# Patient Record
Sex: FEMALE | Race: WHITE | NOT HISPANIC OR LATINO | Employment: FULL TIME | URBAN - METROPOLITAN AREA
[De-identification: names, ages, dates, MRNs, and addresses within clinical notes are randomized per-mention and may not be internally consistent; named-entity substitution may affect disease eponyms.]

---

## 2017-02-23 ENCOUNTER — OFFICE VISIT (OUTPATIENT)
Dept: OBSTETRICS AND GYNECOLOGY | Facility: CLINIC | Age: 62
End: 2017-02-23

## 2017-02-23 VITALS
DIASTOLIC BLOOD PRESSURE: 74 MMHG | BODY MASS INDEX: 21.99 KG/M2 | WEIGHT: 132 LBS | HEIGHT: 65 IN | RESPIRATION RATE: 14 BRPM | SYSTOLIC BLOOD PRESSURE: 122 MMHG

## 2017-02-23 DIAGNOSIS — N95.0 POSTMENOPAUSAL BLEEDING: Primary | ICD-10-CM

## 2017-02-23 PROCEDURE — 99213 OFFICE O/P EST LOW 20 MIN: CPT | Performed by: OBSTETRICS & GYNECOLOGY

## 2017-02-23 RX ORDER — FLUTICASONE PROPIONATE 50 MCG
2 SPRAY, SUSPENSION (ML) NASAL DAILY
COMMUNITY

## 2017-02-23 RX ORDER — MONTELUKAST SODIUM 10 MG/1
10 TABLET ORAL NIGHTLY
COMMUNITY

## 2017-02-23 RX ORDER — LEVOTHYROXINE SODIUM 0.07 MG/1
75 TABLET ORAL DAILY
COMMUNITY

## 2017-02-23 NOTE — PROGRESS NOTES
"Subjective   Chief Complaint   Patient presents with   • Dysmenorrhea     Arely Mesa is a 61 y.o. year old .  Patient's last menstrual period was 2014 (approximate).  She presents to be seen because of evaluation for cramps.  Approximately 3 weeks ago she had midline suprapubic cramps.  Subsequently there was a very small spot of blood.  Since that time there has been nothing.  She is on Duavee.  Likes this much better than Prempro.  Hot flashes are much better controlled.    OTHER COMPLAINTS:  none    The following portions of the patient's history were reviewed and updated as appropriate:current medications, allergies, past family history, past medical history, past social history and past surgical history    Smoking status: Never Smoker                                                                 Smokeless status: Not on file                       Review of Systems  Consitutional POS: nothing reported    NEG: anorexia or night sweats   Gastointestinal POS: nothing reported    NEG: bloating, change in bowel habits, melena or reflux symptoms   Genitourinary POS: nothing reported    NEG: dysuria or hematuria   Integument POS: nothing reported    NEG: moles that are changing in size, shape, color or rashes   Breast POS: nothing reported    NEG: persistent breast lump, skin dimpling or nipple discharge         Objective   Visit Vitals   • /74   • Resp 14   • Ht 65\" (165.1 cm)   • Wt 132 lb (59.9 kg)   • LMP 2014 (Approximate)   • Breastfeeding No   • BMI 21.97 kg/m2       General:  well developed; well nourished  no acute distress   Pelvis: Clinical staff was present for exam  External genitalia:  normal appearance of the external genitalia including Bartholin's and [place]'s glands.  :  urethral meatus normal; urethral hypermobility is absent.  Vaginal:  normal pink mucosa without prolapse or lesions.  Cervix:  normal appearance. Small (2 mm) polyp at external cervical os     Lab " Review   No data reviewed    Imaging   No data reviewed        Assessment   1. Postmenopausal spotting on hormone replacement therapy  2. Asymptomatic cervical polyp with recent normal Pap smear     Plan   1. The following tests were ordered today: ultrasound any time that is convenient for her.  It was explained to Arely that all lab test should be back within the one week after they are performed. She will be notified about the results, regardless of the findings. If she has not been contacted by the office within 2 weeks after the test has been performed, it is her responsibility to contact us to learn about her results.  2. Follow up for ultrasound    3. The following data needs to be obtained to update her medical records: last ovarian cancer screening report from [facility].       This note was electronically signed.    Linus Baxter M.D.  February 23, 2017

## 2017-02-23 NOTE — PROGRESS NOTES
Transvaginal ultrasound performed today demonstrates a thin uniform endometrium without obvious intracavitary pathology.  There are a couple fibroids present just do not appear to encroach on the endometrial cavity.    Impression:  Dysfunctional uterine bleeding presumed from either polyp or hormone replacement therapy    Recommendation:  No additional treatment is needed unless the bleeding becomes a quality of life issue.      Linus Baxter M.D.  February 23, 2017  10:20 AM

## 2017-05-08 ENCOUNTER — OFFICE VISIT (OUTPATIENT)
Dept: OBSTETRICS AND GYNECOLOGY | Facility: CLINIC | Age: 62
End: 2017-05-08

## 2017-05-08 VITALS
RESPIRATION RATE: 14 BRPM | SYSTOLIC BLOOD PRESSURE: 118 MMHG | DIASTOLIC BLOOD PRESSURE: 74 MMHG | WEIGHT: 133 LBS | HEIGHT: 65 IN | BODY MASS INDEX: 22.16 KG/M2

## 2017-05-08 DIAGNOSIS — M85.862 OSTEOPENIA OF LOWER LEG, BILATERAL: ICD-10-CM

## 2017-05-08 DIAGNOSIS — M85.861 OSTEOPENIA OF LOWER LEG, BILATERAL: ICD-10-CM

## 2017-05-08 DIAGNOSIS — Z01.419 WELL WOMAN EXAM WITH ROUTINE GYNECOLOGICAL EXAM: Primary | Chronic | ICD-10-CM

## 2017-05-08 PROCEDURE — 99396 PREV VISIT EST AGE 40-64: CPT | Performed by: OBSTETRICS & GYNECOLOGY

## 2017-05-08 RX ORDER — CETIRIZINE HYDROCHLORIDE 10 MG/1
10 TABLET ORAL DAILY
COMMUNITY

## 2018-03-05 ENCOUNTER — TELEPHONE (OUTPATIENT)
Dept: OBSTETRICS AND GYNECOLOGY | Facility: CLINIC | Age: 63
End: 2018-03-05

## 2018-03-05 NOTE — TELEPHONE ENCOUNTER
Broke her leg and is now on 325mg asa  twice a day is wanting to know should she still take her HRT, she has herd it could cause blood clots

## 2018-03-05 NOTE — TELEPHONE ENCOUNTER
If she is having a surgical repair orders immobilized for a prolonged period of time she may want to hold the hormones until she is more mobile.  Otherwise she can continue taking them.

## 2018-03-05 NOTE — TELEPHONE ENCOUNTER
Provider Name mac  Reason for Call medication question - recent bone break on aspirin - wants to know if she can continue HRT  [facility]  Call Back Number [phone]

## 2018-04-18 ENCOUNTER — APPOINTMENT (OUTPATIENT)
Dept: BONE DENSITY | Facility: HOSPITAL | Age: 63
End: 2018-04-18
Attending: OBSTETRICS & GYNECOLOGY

## 2018-06-13 ENCOUNTER — HOSPITAL ENCOUNTER (OUTPATIENT)
Dept: BONE DENSITY | Facility: HOSPITAL | Age: 63
Discharge: HOME | End: 2018-06-13
Attending: OBSTETRICS & GYNECOLOGY

## 2018-06-13 ENCOUNTER — HOSPITAL ENCOUNTER (OUTPATIENT)
Dept: BONE DENSITY | Facility: HOSPITAL | Age: 63
Discharge: HOME | End: 2018-06-13
Attending: OBSTETRICS & GYNECOLOGY | Admitting: OBSTETRICS & GYNECOLOGY

## 2018-06-13 DIAGNOSIS — M85.861 OSTEOPENIA OF LOWER LEG, BILATERAL: ICD-10-CM

## 2018-06-13 DIAGNOSIS — M85.862 OSTEOPENIA OF LOWER LEG, BILATERAL: ICD-10-CM

## 2018-06-13 PROCEDURE — 77080 DXA BONE DENSITY AXIAL: CPT

## 2018-06-18 ENCOUNTER — TELEPHONE (OUTPATIENT)
Dept: OBSTETRICS AND GYNECOLOGY | Facility: CLINIC | Age: 63
End: 2018-06-18

## 2018-10-03 ENCOUNTER — OFFICE VISIT (OUTPATIENT)
Dept: OBSTETRICS AND GYNECOLOGY | Facility: CLINIC | Age: 63
End: 2018-10-03

## 2018-10-03 VITALS
SYSTOLIC BLOOD PRESSURE: 120 MMHG | RESPIRATION RATE: 14 BRPM | BODY MASS INDEX: 23.46 KG/M2 | WEIGHT: 141 LBS | DIASTOLIC BLOOD PRESSURE: 72 MMHG

## 2018-10-03 DIAGNOSIS — Z01.419 WELL WOMAN EXAM WITH ROUTINE GYNECOLOGICAL EXAM: Primary | ICD-10-CM

## 2018-10-03 PROBLEM — H81.01 MENIERE DISEASE, RIGHT: Status: ACTIVE | Noted: 2018-01-01

## 2018-10-03 PROBLEM — Z79.890 HORMONE REPLACEMENT THERAPY: Status: ACTIVE | Noted: 2018-10-03

## 2018-10-03 PROCEDURE — 99396 PREV VISIT EST AGE 40-64: CPT | Performed by: OBSTETRICS & GYNECOLOGY

## 2018-10-03 RX ORDER — AMITRIPTYLINE HYDROCHLORIDE 50 MG/1
50 TABLET, FILM COATED ORAL NIGHTLY
COMMUNITY
End: 2023-03-09

## 2018-10-03 NOTE — PROGRESS NOTES
Subjective   Chief Complaint   Patient presents with   • Gynecologic Exam     Arely Mesa is a 63 y.o. year old  menopausal female presenting to be seen for her annual exam.  This past year she has been on hormone replacement therapy.  There has not been vaginal bleeding in the last 12 months.  Menopausal symptoms are not present.    SEXUAL Hx:  She is not currently sexually active.  In the past year there she has not been sexually active.    Condoms are not needed because she is not sexually active.  She would not like to be screened for STD's at today's exam.  [place] is painful: n/a  HEALTH Hx:  She exercises regularly: no (and has no plans to become more active).  She wears her seat belt: yes.  She has concerns about domestic violence: no.  She has noticed changes in height: no.  OTHER THINGS SHE WANTS TO DISCUSS TODAY:  Nothing else    The following portions of the patient's history were reviewed and updated as appropriate:problem list, current medications, allergies, past family history, past medical history, past social history and past surgical history.    Smoking status: Never Smoker                                                                 Smokeless tobacco: Not on file                         Review of Systems  Constitutional POS: nothing reported    NEG: anorexia or night sweats   Genitourinary POS: nothing reported    NEG: dysuria or hematuria      Gastointestinal POS: nothing reported and had colonoscopy in the past 5 year - results are not in record for review.  Was normal and told next scope 5 years    NEG: bloating, change in bowel habits, melena or reflux symptoms   Integument POS: nothing reported    NEG: moles that are changing in size, shape, color or rashes   Breast POS: nothing reported    NEG: persistent breast lump, skin dimpling or nipple discharge        Objective   /72   Resp 14   Wt 64 kg (141 lb)   LMP 2014 (Approximate)   Breastfeeding? No   BMI  23.46 kg/m²     General:  well developed; well nourished  no acute distress   Skin:  No suspicious lesions seen   Thyroid: normal to inspection and palpation   Breasts:  Examined in supine position  Symmetric without masses or skin dimpling  Nipples normal without inversion, lesions or discharge  There are no palpable axillary nodes   Abdomen: soft, non-tender; no masses  no umbilical or inginual hernias are present  no hepato-splenomegaly   Pelvis: Clinical staff was present for exam  External genitalia:  normal appearance of the external genitalia including Bartholin's and [place]'s glands.  :  urethral meatus normal;  Vaginal:  normal pink mucosa without prolapse or lesions.  Cervix:  normal appearance.  Uterus:  normal size, shape and consistency.  Adnexa:  normal bimanual exam of the adnexa.  Rectal:  digital rectal exam not performed; anus visually normal appearing.        Assessment   1. Normal GYN exam in menopause  2. Menopausal female currently on HRT - without significant symptoms affecting activities of daily living  3. Osteopenia with stable T-scores  4. She is up to date on all relevant gynecologic and colorectal screenings     Plan   1. Pap was not done today.  I explained to Arely that the recommendations for Pap smear interval in a low risk patient has lengthened to 3 years time.  I told Arely she still needs to be seen in our office yearly for a full physical including breast and pelvic exam.  2. She was encouraged to get yearly mammograms.  She should report any palpable breast lump(s) or skin changes regardless of mammographic findings.  I explained to Arely that notification regarding her mammogram results will come from the center performing the study.  Our office will not be routinely calling with mammogram results.  It is her responsibility to make sure that the results from the mammogram are communicated to her by the breast center.  If she has any questions about the results, she is welcome  to call our office anytime.  3. The following data needs to be obtained to update her medical records: last colonoscopy.  4. The importance of keeping all planned follow-up and taking all medications as prescribed was emphasized.  5. Follow up for annual exam 1 year    New Medications Ordered This Visit   Medications   • Conj Estrogens-Bazedoxifene (DUAVEE) 0.45-20 MG tablet     Sig: Take 1 tablet by mouth Daily.     Dispense:  30 tablet     Refill:  12          This note was electronically signed.    Linus Baxter M.D.  October 3, 2018    Note: Speech recognition transcription software may have been used to create portions of this document.  An attempt at proofreading has been made but errors in transcription could still be present.

## 2019-12-26 NOTE — PROGRESS NOTES
Subjective   Chief Complaint   Patient presents with   • Gynecologic Exam     Arely Mesa is a 64 y.o. year old  menopausal female presenting to be seen for her annual exam.  This past year she has been on hormone replacement therapy.  She has not had any vaginal bleeding in the last 12 months.  Menopausal symptoms are not present.  When she tried to stop her hormone replacement therapy around the time of her patellar surgery vasomotor symptoms became a problem.  She found when she takes the hormone replacement therapy every other day she is doing much better.    Went to [place] and [place] this year to speak and for pleasure.    SEXUAL Hx:  She is not currently sexually active.  In the past year there she has not been sexually active.    Condoms are not needed because she is not sexually active.  She would not like to be screened for STD's at today's exam.  [place] is painful: n/a  HEALTH Hx:  She exercises regularly: yes.  She wears her seat belt: yes.  She has concerns about domestic violence: no.  She has noticed changes in height: no.  OTHER THINGS SHE WANTS TO DISCUSS TODAY:  Nothing else    The following portions of the patient's history were reviewed and updated as appropriate:problem list, current medications, allergies, past family history, past medical history, past social history and past surgical history.    Social History    Tobacco Use      Smoking status: Never Smoker      Review of Systems  Constitutional POS: nothing reported    NEG: anorexia or night sweats   Genitourinary POS: nothing reported    NEG: dysuria or hematuria      Gastointestinal POS: nothing reported    NEG: bloating, change in bowel habits, melena or reflux symptoms   Integument POS: nothing reported    NEG: moles that are changing in size, shape, color or rashes   Breast POS: nothing reported    NEG: persistent breast lump, skin dimpling or nipple discharge        Objective   /74   Resp 14   Wt 64.4 kg  (142 lb)   LMP 02/23/2014 (Approximate)   Breastfeeding No   BMI 23.63 kg/m²     General:  well developed; well nourished  no acute distress   Skin:  No suspicious lesions seen   Thyroid: normal to inspection and palpation   Breasts:  Examined in supine position  Symmetric without masses or skin dimpling  Nipples normal without inversion, lesions or discharge  There are no palpable axillary nodes   Abdomen: soft, non-tender; no masses  no umbilical or inguinal hernias are present  no hepato-splenomegaly   Pelvis: Clinical staff was present for exam  External genitalia:  normal appearance of the external genitalia including Bartholin's and [place]'s glands.  :  urethral meatus normal;  Vaginal:  atrophic mucosal changes are present;  Cervix:  normal appearance.  Uterus:  normal size, shape and consistency.  Adnexa:  non palpable bilaterally.  Rectal:  digital rectal exam not performed; anus visually normal appearing.        Assessment   1. Normal GYN exam in menopause  2. Menopausal female currently on HRT - without significant symptoms affecting activities of daily living   3. Osteopenia (2014) w/ FRAX n/a due to HRT - DEXA not yet due.  4. Increased risk breast cancer by CASI score - not SERM candidate while on HRT  5. She is up to date on all relevant gynecologic and colorectal screenings     Plan   1. Pap was done today.  If she does not receive the results of the Pap within 2 weeks  time, she was instructed to call to find out the results.  I explained to Arely that the recommendations for Pap smear interval in a low risk patient's has lengthened to 3 years time.  I encouraged her to be seen yearly for a full physical exam including breast and pelvic exam even during the off years when PAP's will not be performed.  2. She was encouraged to get yearly mammograms.  She should report any palpable breast lump(s) or skin changes regardless of mammographic findings.  I explained to Arely that notification regarding  her mammogram results will come from the center performing the study.  Our office will not be routinely calling with mammogram results.  It is her responsibility to make sure that the results from the mammogram are communicated to her by the breast center.  If she has any questions about the results, she is welcome to call our office anytime.  3. Consider discontinuation of hormone replacement therapy at this point now that she is distant from her surgery and otherwise feeling better  4. I reviewed data from the NSABP-2 and STAR studies.  I explained to Arely that for patients with a calculated risk equivalent to a 60 years old woman, these studies documented that 5 years use with either tamoxifen OR raloxifene (SERM's) was associated with a 50% lifetime reduction in the incidence of breast cancer.  With patients taking tamoxifen, there is a slight increase in the risk of uterine cancer.  This risk was not found with raloxifene use.  Both medications are indicated for post-menopausal woman seeking to reduce their risk for breast cancer.  Only tamoxifen is indicated for premenopausal women seeking to reduce their breast cancer risks.  After hearing this information, Arely will see how she does off HRT and if able to stop, will consider chemoprophylaxis.  5. The importance of keeping all planned follow-up and taking all medications as prescribed was emphasized.  6. Follow up for annual exam 1 year    New Medications Ordered This Visit   Medications   • Conj Estrogens-Bazedoxifene (DUAVEE) 0.45-20 MG tablet     Sig: Take 1 tablet by mouth Daily.     Dispense:  30 tablet     Refill:  12          This note was electronically signed.    Linus Baxter M.D.  December 27, 2019    Note: Speech recognition transcription software may have been used to create portions of this document.  An attempt at proofreading has been made but errors in transcription could still be present.

## 2019-12-27 ENCOUNTER — OFFICE VISIT (OUTPATIENT)
Dept: OBSTETRICS AND GYNECOLOGY | Facility: CLINIC | Age: 64
End: 2019-12-27

## 2019-12-27 VITALS
RESPIRATION RATE: 14 BRPM | BODY MASS INDEX: 23.63 KG/M2 | WEIGHT: 142 LBS | DIASTOLIC BLOOD PRESSURE: 74 MMHG | SYSTOLIC BLOOD PRESSURE: 118 MMHG

## 2019-12-27 DIAGNOSIS — Z01.419 WELL WOMAN EXAM WITH ROUTINE GYNECOLOGICAL EXAM: Primary | ICD-10-CM

## 2019-12-27 DIAGNOSIS — Z91.89 INCREASED RISK OF BREAST CANCER: ICD-10-CM

## 2019-12-27 DIAGNOSIS — M85.861 OSTEOPENIA OF LOWER LEG, BILATERAL: ICD-10-CM

## 2019-12-27 DIAGNOSIS — M85.862 OSTEOPENIA OF LOWER LEG, BILATERAL: ICD-10-CM

## 2019-12-27 PROCEDURE — 99396 PREV VISIT EST AGE 40-64: CPT | Performed by: OBSTETRICS & GYNECOLOGY

## 2020-12-29 ENCOUNTER — OFFICE VISIT (OUTPATIENT)
Dept: OBSTETRICS AND GYNECOLOGY | Facility: CLINIC | Age: 65
End: 2020-12-29

## 2020-12-29 VITALS
DIASTOLIC BLOOD PRESSURE: 74 MMHG | SYSTOLIC BLOOD PRESSURE: 116 MMHG | RESPIRATION RATE: 14 BRPM | BODY MASS INDEX: 23.8 KG/M2 | WEIGHT: 143 LBS

## 2020-12-29 DIAGNOSIS — Z79.890 HORMONE REPLACEMENT THERAPY: ICD-10-CM

## 2020-12-29 DIAGNOSIS — M85.861 OSTEOPENIA OF LOWER LEG, BILATERAL: ICD-10-CM

## 2020-12-29 DIAGNOSIS — Z01.419 WELL WOMAN EXAM WITH ROUTINE GYNECOLOGICAL EXAM: Primary | ICD-10-CM

## 2020-12-29 DIAGNOSIS — M85.862 OSTEOPENIA OF LOWER LEG, BILATERAL: ICD-10-CM

## 2020-12-29 DIAGNOSIS — Z91.89 INCREASED RISK OF BREAST CANCER: ICD-10-CM

## 2020-12-29 PROCEDURE — 99397 PER PM REEVAL EST PAT 65+ YR: CPT | Performed by: OBSTETRICS & GYNECOLOGY

## 2020-12-29 RX ORDER — ESTROGEN,CON/M-PROGEST ACET 0.3-1.5MG
1 TABLET ORAL DAILY
Qty: 90 TABLET | Refills: 4 | Status: SHIPPED | OUTPATIENT
Start: 2020-12-29 | End: 2022-03-03

## 2020-12-29 NOTE — PROGRESS NOTES
Subjective   Chief Complaint   Patient presents with   • Gynecologic Exam     Arely Mesa is a 65 y.o. year old  menopausal female presenting to be seen for her annual exam.      This past year she has been on hormone replacement therapy.  Because of the unavailability of Duavee, she has been spacing out her dosing.  She feels not as good taking the Duavee only once or twice a week.  Weight is not changed but the weight is become more central and obesity.  Hot flashes and night sweats a little bit of an issue but not horrible.  She has not had any vaginal bleeding in the last 12 months.    SEXUAL Hx:  She is not currently sexually active.  In the past year there she has not been sexually active.    Condoms are not needed because she is not sexually active.  She would not like to be screened for STD's at today's exam.  [place] is painful: n/a  HEALTH Hx:  She exercises regularly: yes.  She wears her seat belt: yes.  She has concerns about domestic violence: no.  She has noticed changes in height: no.  OTHER THINGS SHE WANTS TO DISCUSS TODAY:  Nothing else    The following portions of the patient's history were reviewed and updated as appropriate:problem list, current medications, allergies, past family history, past medical history, past social history and past surgical history.    Social History    Tobacco Use      Smoking status: Never Smoker      Review of Systems  Constitutional POS: nothing reported    NEG: anorexia or night sweats   Genitourinary POS: nothing reported    NEG: dysuria or hematuria      Gastointestinal POS: nothing reported    NEG: bloating, change in bowel habits, melena or reflux symptoms   Integument POS: nothing reported    NEG: moles that are changing in size, shape, color or rashes   Breast POS: nothing reported    NEG: persistent breast lump, skin dimpling or nipple discharge        Objective   /74   Resp 14   Wt 64.9 kg (143 lb)   LMP 2014 (Approximate)    Breastfeeding No   BMI 23.80 kg/m²     General:  well developed; well nourished  no acute distress   Skin:  No suspicious lesions seen   Thyroid: normal to inspection and palpation   Breasts:  Examined in supine position  Symmetric without masses or skin dimpling  Nipples normal without inversion, lesions or discharge  There are no palpable axillary nodes   Abdomen: soft, non-tender; no masses  no umbilical or inguinal hernias are present  no hepato-splenomegaly   Pelvis: Clinical staff was present for exam  External genitalia:  normal appearance of the external genitalia including Bartholin's and [place]'s glands.  :  urethral meatus normal;  Vaginal:  atrophic mucosal changes are present;  Cervix:  normal appearance.  Uterus:  normal size, shape and consistency.  Adnexa:  non palpable bilaterally.  Rectal:  digital rectal exam not performed; anus visually normal appearing.        Assessment   1. Normal GYN exam in menopause  2. Osteopenia FRAX n/a b/c on HRT - DEXA is not yet due  3. Increased risk breast cancer by CASI score - not SERM candidate while on HRT  4. Menopausal female currently not on HRT - with significant symptoms affecting activities of daily living  5. She is up to date on all relevant gynecologic and colorectal screenings     Plan   1. Pap was not done today.  I explained to Arely that the recommendations for Pap smear interval in a low risk patient has lengthened to 3 years time.  I told Arely she still needs to be seen in our office yearly for a full physical including breast and pelvic exam.`  2. She was encouraged to get yearly mammograms.  She should report any palpable breast lump(s) or skin changes regardless of mammographic findings.  I explained to Arely that notification regarding her mammogram results will come from the center performing the study.  Our office will not be routinely calling with mammogram results.  It is her responsibility to make sure that the results from the  mammogram are communicated to her by the breast center.  If she has any questions about the results, she is welcome to call our office anytime.  3. Resume hormone replacement therapy with Prempro due to the unavailability of Duavee  4. The importance of keeping all planned follow-up and taking all medications as prescribed was emphasized.  5. Follow up for annual exam 1 year    New Medications Ordered This Visit   Medications   • Prempro 0.3-1.5 MG per tablet     Sig: Take 1 tablet by mouth Daily.     Dispense:  90 tablet     Refill:  4          This note was electronically signed.    Linus Baxter M.D.  December 29, 2020    Note: Speech recognition transcription software may have been used to create portions of this document.  An attempt at proofreading has been made but errors in transcription could still be present.

## 2022-03-03 ENCOUNTER — OFFICE VISIT (OUTPATIENT)
Dept: OBSTETRICS AND GYNECOLOGY | Facility: CLINIC | Age: 67
End: 2022-03-03

## 2022-03-03 ENCOUNTER — LAB (OUTPATIENT)
Dept: LAB | Facility: HOSPITAL | Age: 67
End: 2022-03-03

## 2022-03-03 VITALS
BODY MASS INDEX: 23.46 KG/M2 | SYSTOLIC BLOOD PRESSURE: 108 MMHG | WEIGHT: 141 LBS | DIASTOLIC BLOOD PRESSURE: 74 MMHG | RESPIRATION RATE: 14 BRPM

## 2022-03-03 DIAGNOSIS — M85.862 OSTEOPENIA OF LOWER LEG, BILATERAL: ICD-10-CM

## 2022-03-03 DIAGNOSIS — Z91.89 INCREASED RISK OF BREAST CANCER: ICD-10-CM

## 2022-03-03 DIAGNOSIS — Z71.85 VACCINE COUNSELING: ICD-10-CM

## 2022-03-03 DIAGNOSIS — M85.861 OSTEOPENIA OF LOWER LEG, BILATERAL: ICD-10-CM

## 2022-03-03 DIAGNOSIS — R35.0 URINARY FREQUENCY: ICD-10-CM

## 2022-03-03 DIAGNOSIS — Z01.419 WELL WOMAN EXAM WITH ROUTINE GYNECOLOGICAL EXAM: Primary | ICD-10-CM

## 2022-03-03 PROBLEM — H81.01 MENIERE DISEASE, RIGHT: Status: RESOLVED | Noted: 2018-01-01 | Resolved: 2022-03-03

## 2022-03-03 LAB
BACTERIA UR QL AUTO: NORMAL /HPF
BILIRUB UR QL STRIP: NEGATIVE
CLARITY UR: CLEAR
COLOR UR: YELLOW
GLUCOSE UR STRIP-MCNC: NEGATIVE MG/DL
HGB UR QL STRIP.AUTO: NEGATIVE
HYALINE CASTS UR QL AUTO: NORMAL /LPF
KETONES UR QL STRIP: NEGATIVE
LEUKOCYTE ESTERASE UR QL STRIP.AUTO: ABNORMAL
NITRITE UR QL STRIP: NEGATIVE
PH UR STRIP.AUTO: 6.5 [PH] (ref 5–8)
PROT UR QL STRIP: NEGATIVE
RBC # UR STRIP: NORMAL /HPF
REF LAB TEST METHOD: NORMAL
SP GR UR STRIP: 1.01 (ref 1–1.03)
SQUAMOUS #/AREA URNS HPF: NORMAL /HPF
UROBILINOGEN UR QL STRIP: ABNORMAL
WBC # UR STRIP: NORMAL /HPF

## 2022-03-03 PROCEDURE — 81001 URINALYSIS AUTO W/SCOPE: CPT

## 2022-03-03 PROCEDURE — 99397 PER PM REEVAL EST PAT 65+ YR: CPT | Performed by: OBSTETRICS & GYNECOLOGY

## 2022-03-03 RX ORDER — ESTRADIOL 0.5 MG/1
0.5 TABLET ORAL DAILY
Qty: 90 TABLET | Refills: 4 | Status: SHIPPED | OUTPATIENT
Start: 2022-03-03 | End: 2023-03-09 | Stop reason: SDUPTHER

## 2022-03-03 RX ORDER — PROGESTERONE 100 MG/1
100 CAPSULE ORAL DAILY
Qty: 90 CAPSULE | Refills: 4 | Status: SHIPPED | OUTPATIENT
Start: 2022-03-03 | End: 2023-03-09 | Stop reason: SDUPTHER

## 2022-03-03 NOTE — PROGRESS NOTES
Subjective   Chief Complaint   Patient presents with   • Gynecologic Exam     Arely Mesa is a 66 y.o. year old  menopausal female presenting to be seen for her annual exam.  She had been on Duavee but because availability she was changed to Prempro.  Cost was prohibitive and so now she is on Estrace and progesterone.    This past year she has not been on hormone replacement therapy.  She has not had any vaginal bleeding in the last 12 months.  Menopausal symptoms are not present.    SEXUAL Hx:  She is not currently sexually active.  In the past year there she has not been sexually active.    Condoms are not needed because she is not sexually active.  She would not like to be screened for STD's at today's exam.  [place] is painful: n/a  HEALTH Hx:  She exercises regularly: yes.  She wears her seat belt: yes.  She has concerns about domestic violence: no.  She has noticed changes in height: no.  OTHER THINGS SHE WANTS TO DISCUSS TODAY:  Nothing else    The following portions of the patient's history were reviewed and updated as appropriate:problem list, current medications, allergies, past family history, past medical history, past social history and past surgical history.    Social History    Tobacco Use      Smoking status: Never Smoker      Smokeless tobacco: Not on file      Review of Systems  Constitutional POS: nothing reported    NEG: anorexia or night sweats   Genitourinary POS: frequency, urgency and it IS effecting her daily living    NEG: dysuria or hematuria      Gastointestinal POS: nothing reported    NEG: bloating, change in bowel habits, melena or reflux symptoms   Integument POS: nothing reported    NEG: moles that are changing in size, shape, color or rashes   Breast POS: nothing reported    NEG: persistent breast lump, skin dimpling or nipple discharge        Objective   /74   Resp 14   Wt 64 kg (141 lb)   LMP 2014 (Approximate)   Breastfeeding No   BMI 23.46 kg/m²      General:  well developed; well nourished  no acute distress   Skin:  No suspicious lesions seen   Thyroid: normal to inspection and palpation   Breasts:  Examined in supine position  Symmetric without masses or skin dimpling  Nipples normal without inversion, lesions or discharge  There are no palpable axillary nodes   Abdomen: soft, non-tender; no masses  no umbilical or inguinal hernias are present  no hepato-splenomegaly   Pelvis: Clinical staff was present for exam  External genitalia:  normal appearance of the external genitalia including Bartholin's and [place]'s glands.  :  urethral meatus normal;  Vaginal:  atrophic mucosal changes are present;  Cervix:  normal appearance.  Uterus:  normal size, shape and consistency.  Adnexa:  normal bimanual exam of the adnexa.  Rectal:  digital rectal exam not performed; anus visually normal appearing.        Assessment   1. Normal GYN exam in menopause  2. Osteopenia with FRAX n/a - DEXA is due  Increased risk of breast cancer - Lifetime risk < 20 % & CASI score elevated (5 year risk > 1.7 %)  Personal history of colon polyps - patient is up to date on screening colonoscopy  Family history of Alzheimer's.  Patient currently on hormone replacement therapy  Urinary frequency and urgency.  Recent ultrasound at [facility] ovarian cancer screening unremarkable. This is a new finding that does need to be worked up further  3. Menopausal female currently on HRT - without significant symptoms affecting activities of daily living  4. She is up to date on all relevant gynecologic and colorectal screenings     Plan   1. Pap was not done today.  I explained to Arely that the Pap smears are no longer recommended in patient's after 65 years of age.   I stressed to Arely that she still should be seen to be seen yearly for a full physical including breast and pelvic exam.  2. The following tests were ordered today: UA with culture if indicated.  It was explained to Arely that all lab test  should be back within the one week after they are performed. She will be notified about the results, regardless of the findings. If she has not been contacted by the office within 2 weeks after the test has been performed, it is her responsibility to contact us to learn about her results.  3. If there is no evidence of hematuria or UTI, treatment options to include prescription therapy such as anticholinergic or pelvic floor physical therapy  4. She was encouraged to get yearly mammograms.  She should report any palpable breast lump(s) or skin changes regardless of mammographic findings.  I explained to Arely that notification regarding her mammogram results will come from the center performing the study.  Our office will not be routinely calling with mammogram results.  It is her responsibility to make sure that the results from the mammogram are communicated to her by the breast center.  If she has any questions about the results, she is welcome to call our office anytime.  5. Bone density testing was recommended.  I reviewed with Arely that it was always most advisable for all bone density tests for each patient to be done on the same machine over time.  The purpose of this is to improve the accuracy of the interpretation of serial studies.  6. Her vaccine record was reviewed and updated.  7. The importance of keeping all planned follow-up and taking all medications as prescribed was emphasized.  8. Follow up for annual exam 1 year    New Medications Ordered This Visit   Medications   • estradiol (Estrace) 0.5 MG tablet     Sig: Take 1 tablet by mouth Daily.     Dispense:  90 tablet     Refill:  4   • Progesterone (Prometrium) 100 MG capsule     Sig: Take 1 capsule by mouth Daily.     Dispense:  90 capsule     Refill:  4          This note was electronically signed.    Linus Baxter M.D.  March 3, 2022    Part of this note may be an electronic transcription/translation of spoken language to printed text  using the Dragon Dictation System.

## 2022-05-12 ENCOUNTER — TELEPHONE (OUTPATIENT)
Dept: OBSTETRICS AND GYNECOLOGY | Facility: CLINIC | Age: 67
End: 2022-05-12

## 2022-05-12 DIAGNOSIS — R35.0 URINARY FREQUENCY: Primary | ICD-10-CM

## 2022-05-26 ENCOUNTER — HOSPITAL ENCOUNTER (OUTPATIENT)
Dept: BONE DENSITY | Facility: HOSPITAL | Age: 67
Discharge: HOME | End: 2022-05-26
Admitting: OBSTETRICS & GYNECOLOGY

## 2022-05-26 DIAGNOSIS — M85.861 OSTEOPENIA OF LOWER LEG, BILATERAL: ICD-10-CM

## 2022-05-26 DIAGNOSIS — M85.862 OSTEOPENIA OF LOWER LEG, BILATERAL: ICD-10-CM

## 2022-05-26 PROCEDURE — 77080 DXA BONE DENSITY AXIAL: CPT

## 2022-05-27 ENCOUNTER — TELEPHONE (OUTPATIENT)
Dept: OBSTETRICS AND GYNECOLOGY | Facility: CLINIC | Age: 67
End: 2022-05-27

## 2022-05-27 DIAGNOSIS — M85.862 OSTEOPENIA OF LOWER LEG, BILATERAL: Primary | ICD-10-CM

## 2022-05-27 DIAGNOSIS — M85.861 OSTEOPENIA OF LOWER LEG, BILATERAL: Primary | ICD-10-CM

## 2022-05-28 NOTE — TELEPHONE ENCOUNTER
----- Message from Maria Luisa Sandoval RN sent at 5/27/2022 11:23 AM EDT -----  Advised of results and message from Dr baxter, voiced understanding. Desires to have labs ordered by Dr Baxter.

## 2022-05-31 NOTE — TELEPHONE ENCOUNTER
Dr. Baxter' patient   [phone] called patient and left a detailed message advising patient Dr. Baxter placed an order for her blood work.

## 2023-03-09 ENCOUNTER — OFFICE VISIT (OUTPATIENT)
Dept: OBSTETRICS AND GYNECOLOGY | Facility: CLINIC | Age: 68
End: 2023-03-09
Payer: COMMERCIAL

## 2023-03-09 VITALS
BODY MASS INDEX: 22.3 KG/M2 | DIASTOLIC BLOOD PRESSURE: 72 MMHG | SYSTOLIC BLOOD PRESSURE: 118 MMHG | RESPIRATION RATE: 14 BRPM | WEIGHT: 134 LBS

## 2023-03-09 DIAGNOSIS — Z91.89 INCREASED RISK OF BREAST CANCER: ICD-10-CM

## 2023-03-09 DIAGNOSIS — M85.861 OSTEOPENIA OF LOWER LEG, BILATERAL: ICD-10-CM

## 2023-03-09 DIAGNOSIS — Z01.419 WELL WOMAN EXAM WITH ROUTINE GYNECOLOGICAL EXAM: Primary | ICD-10-CM

## 2023-03-09 DIAGNOSIS — Z71.85 VACCINE COUNSELING: ICD-10-CM

## 2023-03-09 DIAGNOSIS — M85.862 OSTEOPENIA OF LOWER LEG, BILATERAL: ICD-10-CM

## 2023-03-09 DIAGNOSIS — Z79.890 HORMONE REPLACEMENT THERAPY: ICD-10-CM

## 2023-03-09 DIAGNOSIS — Z82.0 FH: ALZHEIMER'S DISEASE: ICD-10-CM

## 2023-03-09 PROCEDURE — 99397 PER PM REEVAL EST PAT 65+ YR: CPT | Performed by: OBSTETRICS & GYNECOLOGY

## 2023-03-09 RX ORDER — CHOLECALCIFEROL (VITAMIN D3) 125 MCG
10 CAPSULE ORAL
COMMUNITY

## 2023-03-09 RX ORDER — PROGESTERONE 100 MG/1
100 CAPSULE ORAL DAILY
Qty: 90 CAPSULE | Refills: 4 | Status: SHIPPED | OUTPATIENT
Start: 2023-03-09

## 2023-03-09 RX ORDER — ESTRADIOL 0.5 MG/1
0.5 TABLET ORAL DAILY
Qty: 90 TABLET | Refills: 4 | Status: SHIPPED | OUTPATIENT
Start: 2023-03-09

## 2023-03-09 NOTE — PROGRESS NOTES
Subjective   Chief Complaint   Patient presents with   • Gynecologic Exam     Arely Mesa is a 67 y.o. year old  menopausal female presenting to be seen for her annual exam.  This past year she has been doing pelvic floor physical therapy in an effort to try to help her nighttime urination.  It did help.  She has had 1 episode of hematuria with UTI during the year which did resolve with antibiotics.    Doing a trip to the [place] this year and will be doing private tour in [place] (she is a big [employer] fan).    This past year she has not been on hormone replacement therapy.  She has not had any vaginal bleeding in the last 12 months.  Menopausal symptoms are not present.    SEXUAL Hx:  She is not currently sexually active.  In the past year there she has not been sexually active.    Condoms are not needed because she is not sexually active.  She would not like to be screened for STD's at today's exam.  [place] is painful: n/a  HEALTH Hx:  She exercises regularly: yes.  She wears her seat belt: yes.  She has concerns about domestic violence: no.  She has noticed changes in height: no.  OTHER THINGS SHE WANTS TO DISCUSS TODAY:  Nothing else    The following portions of the patient's history were reviewed and updated as appropriate:problem list, current medications, allergies, past family history, past medical history, past social history and past surgical history.    Social History    Tobacco Use      Smoking status: Never      Smokeless tobacco: Not on file      Review of Systems  Constitutional POS: nothing reported    NEG: anorexia or night sweats   Genitourinary POS: see HPI    NEG: dysuria or hematuria      Gastointestinal POS: nothing reported    NEG: bloating, change in bowel habits, melena or reflux symptoms   Integument POS: nothing reported and she sees her dermatologist for routine skins exams    NEG: moles that are changing in size, shape, color or rashes   Breast POS: nothing  reported    NEG: persistent breast lump, skin dimpling or nipple discharge        Objective   /72   Resp 14   Wt 60.8 kg (134 lb)   LMP 02/23/2014 (Approximate)   BMI 22.30 kg/m²     General:  well developed; well nourished  no acute distress   Skin:  No suspicious lesions seen   Thyroid: normal to inspection and palpation   Breasts:  Examined in supine position  Symmetric without masses or skin dimpling  Nipples normal without inversion, lesions or discharge  There are no palpable axillary nodes   Abdomen: soft, non-tender; no masses  no umbilical or inguinal hernias are present  no hepato-splenomegaly   Pelvis: Clinical staff was present for exam  External genitalia:  normal appearance of the external genitalia including Bartholin's and [place]'s glands.  :  urethral meatus normal;  Vaginal:  normal pink mucosa without prolapse or lesions.  Cervix:  normal appearance.  Uterus:  normal size, shape and consistency.  Adnexa:  normal bimanual exam of the adnexa.  Rectal:  digital rectal exam not performed; anus visually normal appearing.        Assessment   1. Normal GYN exam in menopause  2. Osteopenia with normal FRAX - DEXA is not yet due  3. Increased risk of breast cancer - Lifetime risk < 20 % & CASI score elevated (5 year risk > 1.7 %)  4. Personal history of colon polyps - patient is due for screening colonoscopy  5. Family history of Alzheimer's.  Patient currently on hormone replacement therapy  6. Menopausal female currently on HRT - without significant symptoms affecting activities of daily living  7. She is up to date on all relevant gynecologic and colorectal screenings     Plan   1. Pap was not done today.  I explained to Arely that the Pap smears are no longer recommended in patient's after 65 years of age.   I stressed to Arely that she still should be seen to be seen yearly for a full physical including breast and pelvic exam.  2. She was encouraged to get yearly mammograms.  She should  report any palpable breast lump(s) or skin changes regardless of mammographic findings.  I explained to Arely that notification regarding her mammogram results will come from the center performing the study.  Our office will not be routinely calling with mammogram results.  It is her responsibility to make sure that the results from the mammogram are communicated to her by the breast center.  If she has any questions about the results, she is welcome to call our office anytime.  3. Colonoscopy was recommended for screening for colon cancer.  The procedure was briefly discussed and its benefits for early detection of colon cancer were emphasized.  I explained to Arely that we could help her to schedule it if she wishes.  Additionally, she could also contact her primary care physician to help make this arrangement.  Because she is not at average risk for colon cancer, Cologuard screening would not be an option I would recommend.  After considering these options she wants help setting up her colonoscopy.  Referral will be made to Dr. Patiño for outpatient colonoscopy.  4. Her vaccine record was reviewed and updated.  5. The importance of keeping all planned follow-up and taking all medications as prescribed was emphasized.  6. Follow up for annual exam 1 year    New Medications Ordered This Visit   Medications   • estradiol (Estrace) 0.5 MG tablet     Sig: Take 1 tablet by mouth Daily.     Dispense:  90 tablet     Refill:  4   • Progesterone (Prometrium) 100 MG capsule     Sig: Take 1 capsule by mouth Daily.     Dispense:  90 capsule     Refill:  4          This note was electronically signed.    Linus Baxter M.D.  March 9, 2023    Part of this note may be an electronic transcription/translation of spoken language to printed text using the Dragon Dictation System.

## 2024-03-18 NOTE — PROGRESS NOTES
Subjective   Chief Complaint   Patient presents with    Gynecologic Exam     Arely Mesa is a 68 y.o. year old  menopausal female presenting to be seen for her annual exam.  Nocturia is getting worse.  She has tried evening fluid modification and pelvic floor physical therapy none of which have helped.  She is also been checked for UTIs and did not have it.    This past year she has not been on hormone replacement therapy.  She has not had any vaginal bleeding in the last 12 months.  Menopausal symptoms are not present.    SEXUAL Hx:  She is not currently sexually active.  In the past year there she has not been sexually active.    Condoms are not needed because she is not sexually active.  She would not like to be screened for STD's at today's exam.  [place] is painful: n/a  HEALTH Hx:  She exercises regularly: yes.  She wears her seat belt: yes.  She has concerns about domestic violence: no.  She has noticed changes in height: no.    The following portions of the patient's history were reviewed and updated as appropriate:problem list, current medications, allergies, past family history, past medical history, past social history, and past surgical history.    Social History    Tobacco Use      Smoking status: Never      Smokeless tobacco: Not on file      Review of Systems  Constitutional POS: nothing reported    NEG: anorexia or night sweats   Genitourinary POS: nocturia and it IS effecting her daily living    NEG: dysuria or hematuria      Gastointestinal POS: nothing reported    NEG: bloating, change in bowel habits, melena, or reflux symptoms   Integument POS: nothing reported    NEG: moles that are changing in size, shape, color or rashes   Breast POS: nothing reported    NEG: persistent breast lump, skin dimpling, or nipple discharge        Objective   /70   Resp 14   Wt 62.1 kg (137 lb)   LMP 2014 (Approximate)   BMI 22.80 kg/m²     General:  well developed; well nourished  no  acute distress   Skin:  No suspicious lesions seen   Thyroid: normal to inspection and palpation   Breasts:  Examined in supine position  Symmetric without masses or skin dimpling  Nipples normal without inversion, lesions or discharge  There are no palpable axillary nodes   Abdomen: soft, non-tender; no masses  no umbilical or inguinal hernias are present  no hepato-splenomegaly   Pelvis: Clinical staff was present for exam  External genitalia:  normal appearance of the external genitalia including Bartholin's and [place]'s glands.  :  urethral meatus normal;  Vaginal:  normal pink mucosa without prolapse or lesions.  Cervix:  normal appearance.  Uterus:  normal size, shape and consistency.  Adnexa:  normal bimanual exam of the adnexa.  Rectal:  digital rectal exam not performed; anus visually normal appearing.        Assessment   Normal GYN exam in menopause  Osteopenia with normal FRAX - DEXA is not yet due  Increased risk of breast cancer - Lifetime risk < 20 % & CASI score elevated (5 year risk > 1.7 %)  OAB - affecting her activities of daily living.  No contraindications to prescribing  Family history of colon cancer/polyps - patient is up to date on screening colonoscopy  Family history of Alzheimer's.  Patient currently on hormone replacement therapy  Menopausal female currently on HRT - without significant symptoms affecting activities of daily living  She is up to date on all relevant gynecologic and colorectal screenings       Plan   Pap was not done today.  I explained to Aerly that the Pap smears are no longer recommended in patient's after 65 years of age.   I stressed to Arely that she still should be seen to be seen yearly for a full physical including breast and pelvic exam.  She was encouraged to get yearly mammograms.  She should report any palpable breast lump(s) or skin changes regardless of mammographic findings.  I explained to Arely that notification regarding her mammogram results will come  from the center performing the study.  Our office will not be routinely calling with mammogram results.  It is her responsibility to make sure that the results from the mammogram are communicated to her by the breast center.  If she has any questions about the results, she is welcome to call our office anytime.  The following data needs to be obtained to update her medical records: last colonoscopy and pathology reports.  I have counseled the patient on the importance of staying up to date with preventive vaccines as well as the risks and benefits of these vaccines.  Her vaccine record was reviewed and updated.  The importance of keeping all planned follow-up and taking all medications as prescribed was emphasized.  Follow up in 8 weeks time to reassess     New Medications Ordered This Visit   Medications    Progesterone (Prometrium) 100 MG capsule     Sig: Take 1 capsule by mouth Daily.     Dispense:  90 capsule     Refill:  4    estradiol (Estrace) 0.5 MG tablet     Sig: Take 1 tablet by mouth Daily.     Dispense:  90 tablet     Refill:  4    oxybutynin XL (Ditropan XL) 10 MG 24 hr tablet     Sig: Take 1 tablet by mouth Daily.     Dispense:  90 tablet     Refill:  1          This note was electronically signed.    Linus Baxter M.D.  March 21, 2024    Part of this note may be an electronic transcription/translation of spoken language to printed text using the Dragon Dictation System.

## 2024-03-21 ENCOUNTER — OFFICE VISIT (OUTPATIENT)
Dept: OBSTETRICS AND GYNECOLOGY | Facility: CLINIC | Age: 69
End: 2024-03-21
Payer: COMMERCIAL

## 2024-03-21 VITALS
DIASTOLIC BLOOD PRESSURE: 70 MMHG | SYSTOLIC BLOOD PRESSURE: 124 MMHG | BODY MASS INDEX: 22.8 KG/M2 | WEIGHT: 137 LBS | RESPIRATION RATE: 14 BRPM

## 2024-03-21 DIAGNOSIS — M85.862 OSTEOPENIA OF LOWER LEG, BILATERAL: ICD-10-CM

## 2024-03-21 DIAGNOSIS — Z79.890 HORMONE REPLACEMENT THERAPY: ICD-10-CM

## 2024-03-21 DIAGNOSIS — Z91.89 INCREASED RISK OF BREAST CANCER: ICD-10-CM

## 2024-03-21 DIAGNOSIS — M85.861 OSTEOPENIA OF LOWER LEG, BILATERAL: ICD-10-CM

## 2024-03-21 DIAGNOSIS — Z01.419 WELL WOMAN EXAM WITH ROUTINE GYNECOLOGICAL EXAM: Primary | ICD-10-CM

## 2024-03-21 DIAGNOSIS — Z71.85 VACCINE COUNSELING: ICD-10-CM

## 2024-03-21 DIAGNOSIS — R35.1 NOCTURIA: ICD-10-CM

## 2024-03-21 PROCEDURE — 99397 PER PM REEVAL EST PAT 65+ YR: CPT | Performed by: OBSTETRICS & GYNECOLOGY

## 2024-03-21 RX ORDER — TURMERIC 400 MG
CAPSULE ORAL
COMMUNITY

## 2024-03-21 RX ORDER — ESTRADIOL 0.5 MG/1
0.5 TABLET ORAL DAILY
Qty: 90 TABLET | Refills: 4 | Status: SHIPPED | OUTPATIENT
Start: 2024-03-21

## 2024-03-21 RX ORDER — OXYBUTYNIN CHLORIDE 10 MG/1
10 TABLET, EXTENDED RELEASE ORAL DAILY
Qty: 90 TABLET | Refills: 1 | Status: SHIPPED | OUTPATIENT
Start: 2024-03-21

## 2024-03-21 RX ORDER — PROGESTERONE 100 MG/1
100 CAPSULE ORAL DAILY
Qty: 90 CAPSULE | Refills: 4 | Status: SHIPPED | OUTPATIENT
Start: 2024-03-21

## 2024-05-23 ENCOUNTER — OFFICE VISIT (OUTPATIENT)
Dept: OBSTETRICS AND GYNECOLOGY | Facility: CLINIC | Age: 69
End: 2024-05-23
Payer: COMMERCIAL

## 2024-05-23 VITALS
DIASTOLIC BLOOD PRESSURE: 80 MMHG | SYSTOLIC BLOOD PRESSURE: 130 MMHG | BODY MASS INDEX: 22.82 KG/M2 | WEIGHT: 137 LBS | HEIGHT: 65 IN

## 2024-05-23 DIAGNOSIS — Z79.890 HORMONE REPLACEMENT THERAPY: Primary | ICD-10-CM

## 2024-05-23 DIAGNOSIS — R35.1 NOCTURIA: ICD-10-CM

## 2024-05-23 RX ORDER — CONJUGATED ESTROGENS/BAZEDOXIFENE .45; 2 MG/1; MG/1
1 TABLET, FILM COATED ORAL DAILY
Qty: 90 TABLET | Refills: 2 | Status: SHIPPED | OUTPATIENT
Start: 2024-05-23

## 2024-05-23 NOTE — PROGRESS NOTES
"Subjective   Chief Complaint   Patient presents with    Follow-up       Arely Mesa is a 68 y.o. year old .  Patient's last menstrual period was 2014 (approximate).  She comes in follow-up with the oxybutynin.  She is having very few benefits from this.  Frequency urgency and nocturia have not changed much.  She is having dry mouth.  S is having constipation.  She also wonders if she can go back to using Duavee which was available in the past and she did well with that.    The following portions of the patient's history were reviewed and updated as appropriate:current medications and allergies    Social History    Tobacco Use      Smoking status: Never      Smokeless tobacco: Not on file         Objective   /80 (BP Location: Left arm, Patient Position: Sitting, Cuff Size: Adult)   Ht 165.1 cm (65\")   Wt 62.1 kg (137 lb)   LMP 2014 (Approximate)   BMI 22.80 kg/m²     Lab Review   No data reviewed    Imaging   No data reviewed        Assessment   Nocturia with overactive bladder suboptimally controlled with oxybutynin  Menopausal female currently on HRT - without significant symptoms affecting activities of daily living     Plan   I would like to start her back on Duavee to hopefully simplify her hormone replacement regimen  Lets stop the oxybutynin and not start anything else and she will let me know within the next month or 2 if things are improved or if we need to try a different medicine to treat presumed overactive bladder  The importance of keeping all planned follow-up and taking all medications as prescribed was emphasized.      New Medications Ordered This Visit   Medications    Duavee 0.45-20 MG tablet     Sig: Take 1 each by mouth Daily.     Dispense:  90 tablet     Refill:  2          This note was electronically signed.    Linus Baxter M.D.  May 23, 2024      Part of this note may be an electronic transcription/translation of spoken language to printed text using " the Dragon Dictation System.

## 2024-08-06 RX ORDER — MIRABEGRON 50 MG/1
50 TABLET, EXTENDED RELEASE ORAL DAILY
Qty: 30 TABLET | Refills: 4 | Status: SHIPPED | OUTPATIENT
Start: 2024-08-06

## 2024-08-06 RX ORDER — MIRABEGRON 50 MG/1
50 TABLET, EXTENDED RELEASE ORAL DAILY
Qty: 30 TABLET | Refills: 4 | Status: SHIPPED | OUTPATIENT
Start: 2024-08-06 | End: 2024-08-06 | Stop reason: SDUPTHER

## 2025-02-06 ENCOUNTER — APPOINTMENT (OUTPATIENT)
Dept: CT IMAGING | Facility: HOSPITAL | Age: 70
DRG: 436 | End: 2025-02-06
Payer: COMMERCIAL

## 2025-02-06 ENCOUNTER — HOSPITAL ENCOUNTER (INPATIENT)
Facility: HOSPITAL | Age: 70
LOS: 2 days | Discharge: HOME | DRG: 436 | End: 2025-02-08
Attending: STUDENT IN AN ORGANIZED HEALTH CARE EDUCATION/TRAINING PROGRAM | Admitting: STUDENT IN AN ORGANIZED HEALTH CARE EDUCATION/TRAINING PROGRAM
Payer: COMMERCIAL

## 2025-02-06 DIAGNOSIS — R59.1 LYMPHADENOPATHY: ICD-10-CM

## 2025-02-06 DIAGNOSIS — R10.84 GENERALIZED ABDOMINAL PAIN: ICD-10-CM

## 2025-02-06 DIAGNOSIS — D72.829 LEUKOCYTOSIS, UNSPECIFIED TYPE: ICD-10-CM

## 2025-02-06 DIAGNOSIS — K86.89 PANCREATIC MASS: Primary | ICD-10-CM

## 2025-02-06 PROBLEM — N32.81 OVERACTIVE BLADDER: Status: ACTIVE | Noted: 2025-02-06

## 2025-02-06 LAB
ALBUMIN SERPL-MCNC: 4.3 G/DL (ref 3.5–5.2)
ALBUMIN/GLOB SERPL: 1.8 G/DL
ALP SERPL-CCNC: 99 U/L (ref 39–117)
ALT SERPL W P-5'-P-CCNC: 17 U/L (ref 1–33)
ANION GAP SERPL CALCULATED.3IONS-SCNC: 11 MMOL/L (ref 5–15)
AST SERPL-CCNC: 24 U/L (ref 1–32)
BACTERIA UR QL AUTO: ABNORMAL /HPF
BASOPHILS # BLD AUTO: 0.08 10*3/MM3 (ref 0–0.2)
BASOPHILS NFR BLD AUTO: 0.4 % (ref 0–1.5)
BILIRUB SERPL-MCNC: 0.3 MG/DL (ref 0–1.2)
BILIRUB UR QL STRIP: NEGATIVE
BUN SERPL-MCNC: 12 MG/DL (ref 8–23)
BUN/CREAT SERPL: 15.6 (ref 7–25)
CALCIUM SPEC-SCNC: 9 MG/DL (ref 8.6–10.5)
CANCER AG19-9 SERPL-ACNC: 15.5 U/ML
CHLORIDE SERPL-SCNC: 94 MMOL/L (ref 98–107)
CLARITY UR: CLEAR
CO2 SERPL-SCNC: 27 MMOL/L (ref 22–29)
COLOR UR: YELLOW
CREAT SERPL-MCNC: 0.77 MG/DL (ref 0.57–1)
CRP SERPL-MCNC: <0.3 MG/DL (ref 0–0.5)
D DIMER PPP FEU-MCNC: 0.6 MCGFEU/ML (ref 0–0.69)
D-LACTATE SERPL-SCNC: 1.7 MMOL/L (ref 0.5–2)
DEPRECATED RDW RBC AUTO: 40.7 FL (ref 37–54)
EGFRCR SERPLBLD CKD-EPI 2021: 83.6 ML/MIN/1.73
EOSINOPHIL # BLD AUTO: 0.03 10*3/MM3 (ref 0–0.4)
EOSINOPHIL NFR BLD AUTO: 0.2 % (ref 0.3–6.2)
ERYTHROCYTE [DISTWIDTH] IN BLOOD BY AUTOMATED COUNT: 12.4 % (ref 12.3–15.4)
FLUAV RNA RESP QL NAA+PROBE: NOT DETECTED
FLUBV RNA RESP QL NAA+PROBE: NOT DETECTED
GEN 5 1HR TROPONIN T REFLEX: 32 NG/L
GLOBULIN UR ELPH-MCNC: 2.4 GM/DL
GLUCOSE SERPL-MCNC: 102 MG/DL (ref 65–99)
GLUCOSE UR STRIP-MCNC: NEGATIVE MG/DL
HCT VFR BLD AUTO: 40.9 % (ref 34–46.6)
HGB BLD-MCNC: 13.8 G/DL (ref 12–15.9)
HGB UR QL STRIP.AUTO: NEGATIVE
HYALINE CASTS UR QL AUTO: ABNORMAL /LPF
IMM GRANULOCYTES # BLD AUTO: 0.1 10*3/MM3 (ref 0–0.05)
IMM GRANULOCYTES NFR BLD AUTO: 0.5 % (ref 0–0.5)
INR PPP: 0.95 (ref 0.89–1.12)
KETONES UR QL STRIP: ABNORMAL
LEUKOCYTE ESTERASE UR QL STRIP.AUTO: ABNORMAL
LIPASE SERPL-CCNC: 30 U/L (ref 13–60)
LYMPHOCYTES # BLD AUTO: 1.45 10*3/MM3 (ref 0.7–3.1)
LYMPHOCYTES NFR BLD AUTO: 7.8 % (ref 19.6–45.3)
MAGNESIUM SERPL-MCNC: 1.7 MG/DL (ref 1.6–2.4)
MCH RBC QN AUTO: 29.9 PG (ref 26.6–33)
MCHC RBC AUTO-ENTMCNC: 33.7 G/DL (ref 31.5–35.7)
MCV RBC AUTO: 88.7 FL (ref 79–97)
MONOCYTES # BLD AUTO: 1.22 10*3/MM3 (ref 0.1–0.9)
MONOCYTES NFR BLD AUTO: 6.5 % (ref 5–12)
NEUTROPHILS NFR BLD AUTO: 15.78 10*3/MM3 (ref 1.7–7)
NEUTROPHILS NFR BLD AUTO: 84.6 % (ref 42.7–76)
NITRITE UR QL STRIP: NEGATIVE
NRBC BLD AUTO-RTO: 0 /100 WBC (ref 0–0.2)
PH UR STRIP.AUTO: 7 [PH] (ref 5–8)
PLATELET # BLD AUTO: 304 10*3/MM3 (ref 140–450)
PMV BLD AUTO: 9.5 FL (ref 6–12)
POTASSIUM SERPL-SCNC: 3.7 MMOL/L (ref 3.5–5.2)
PROCALCITONIN SERPL-MCNC: 0.03 NG/ML (ref 0–0.25)
PROT SERPL-MCNC: 6.7 G/DL (ref 6–8.5)
PROT UR QL STRIP: ABNORMAL
PROTHROMBIN TIME: 12.8 SECONDS (ref 12.2–14.5)
RBC # BLD AUTO: 4.61 10*6/MM3 (ref 3.77–5.28)
RBC # UR STRIP: ABNORMAL /HPF
REF LAB TEST METHOD: ABNORMAL
RSV RNA RESP QL NAA+PROBE: NOT DETECTED
SARS-COV-2 RNA RESP QL NAA+PROBE: NOT DETECTED
SODIUM SERPL-SCNC: 132 MMOL/L (ref 136–145)
SP GR UR STRIP: 1.02 (ref 1–1.03)
SQUAMOUS #/AREA URNS HPF: ABNORMAL /HPF
TROPONIN T % DELTA: 60
TROPONIN T NUMERIC DELTA: 12 NG/L
TROPONIN T SERPL HS-MCNC: 20 NG/L
UROBILINOGEN UR QL STRIP: ABNORMAL
WBC # UR STRIP: ABNORMAL /HPF
WBC NRBC COR # BLD AUTO: 18.66 10*3/MM3 (ref 3.4–10.8)

## 2025-02-06 PROCEDURE — 80053 COMPREHEN METABOLIC PANEL: CPT | Performed by: STUDENT IN AN ORGANIZED HEALTH CARE EDUCATION/TRAINING PROGRAM

## 2025-02-06 PROCEDURE — 93005 ELECTROCARDIOGRAM TRACING: CPT | Performed by: STUDENT IN AN ORGANIZED HEALTH CARE EDUCATION/TRAINING PROGRAM

## 2025-02-06 PROCEDURE — 83605 ASSAY OF LACTIC ACID: CPT | Performed by: STUDENT IN AN ORGANIZED HEALTH CARE EDUCATION/TRAINING PROGRAM

## 2025-02-06 PROCEDURE — 25810000003 LACTATED RINGERS SOLUTION: Performed by: STUDENT IN AN ORGANIZED HEALTH CARE EDUCATION/TRAINING PROGRAM

## 2025-02-06 PROCEDURE — 25510000001 IOPAMIDOL PER 1 ML: Performed by: INTERNAL MEDICINE

## 2025-02-06 PROCEDURE — 25010000002 KETOROLAC TROMETHAMINE PER 15 MG: Performed by: STUDENT IN AN ORGANIZED HEALTH CARE EDUCATION/TRAINING PROGRAM

## 2025-02-06 PROCEDURE — 63710000001 ONDANSETRON ODT 4 MG TABLET DISPERSIBLE: Performed by: STUDENT IN AN ORGANIZED HEALTH CARE EDUCATION/TRAINING PROGRAM

## 2025-02-06 PROCEDURE — 85025 COMPLETE CBC W/AUTO DIFF WBC: CPT | Performed by: STUDENT IN AN ORGANIZED HEALTH CARE EDUCATION/TRAINING PROGRAM

## 2025-02-06 PROCEDURE — 71275 CT ANGIOGRAPHY CHEST: CPT

## 2025-02-06 PROCEDURE — 85379 FIBRIN DEGRADATION QUANT: CPT | Performed by: INTERNAL MEDICINE

## 2025-02-06 PROCEDURE — 87637 SARSCOV2&INF A&B&RSV AMP PRB: CPT | Performed by: STUDENT IN AN ORGANIZED HEALTH CARE EDUCATION/TRAINING PROGRAM

## 2025-02-06 PROCEDURE — 83690 ASSAY OF LIPASE: CPT | Performed by: STUDENT IN AN ORGANIZED HEALTH CARE EDUCATION/TRAINING PROGRAM

## 2025-02-06 PROCEDURE — 86140 C-REACTIVE PROTEIN: CPT | Performed by: STUDENT IN AN ORGANIZED HEALTH CARE EDUCATION/TRAINING PROGRAM

## 2025-02-06 PROCEDURE — 84145 PROCALCITONIN (PCT): CPT | Performed by: STUDENT IN AN ORGANIZED HEALTH CARE EDUCATION/TRAINING PROGRAM

## 2025-02-06 PROCEDURE — 99285 EMERGENCY DEPT VISIT HI MDM: CPT

## 2025-02-06 PROCEDURE — 36415 COLL VENOUS BLD VENIPUNCTURE: CPT

## 2025-02-06 PROCEDURE — 25510000001 IOPAMIDOL 61 % SOLUTION: Performed by: STUDENT IN AN ORGANIZED HEALTH CARE EDUCATION/TRAINING PROGRAM

## 2025-02-06 PROCEDURE — 83735 ASSAY OF MAGNESIUM: CPT | Performed by: STUDENT IN AN ORGANIZED HEALTH CARE EDUCATION/TRAINING PROGRAM

## 2025-02-06 PROCEDURE — 86301 IMMUNOASSAY TUMOR CA 19-9: CPT | Performed by: INTERNAL MEDICINE

## 2025-02-06 PROCEDURE — 84484 ASSAY OF TROPONIN QUANT: CPT | Performed by: STUDENT IN AN ORGANIZED HEALTH CARE EDUCATION/TRAINING PROGRAM

## 2025-02-06 PROCEDURE — 81001 URINALYSIS AUTO W/SCOPE: CPT | Performed by: STUDENT IN AN ORGANIZED HEALTH CARE EDUCATION/TRAINING PROGRAM

## 2025-02-06 PROCEDURE — 99222 1ST HOSP IP/OBS MODERATE 55: CPT

## 2025-02-06 PROCEDURE — 85610 PROTHROMBIN TIME: CPT | Performed by: INTERNAL MEDICINE

## 2025-02-06 PROCEDURE — 74177 CT ABD & PELVIS W/CONTRAST: CPT

## 2025-02-06 RX ORDER — ONDANSETRON 2 MG/ML
4 INJECTION INTRAMUSCULAR; INTRAVENOUS EVERY 6 HOURS PRN
Status: DISCONTINUED | OUTPATIENT
Start: 2025-02-06 | End: 2025-02-08 | Stop reason: HOSPADM

## 2025-02-06 RX ORDER — KETOROLAC TROMETHAMINE 15 MG/ML
15 INJECTION, SOLUTION INTRAMUSCULAR; INTRAVENOUS ONCE
Status: COMPLETED | OUTPATIENT
Start: 2025-02-06 | End: 2025-02-06

## 2025-02-06 RX ORDER — ACETAMINOPHEN 325 MG/1
650 TABLET ORAL EVERY 4 HOURS PRN
Status: DISCONTINUED | OUTPATIENT
Start: 2025-02-06 | End: 2025-02-08 | Stop reason: HOSPADM

## 2025-02-06 RX ORDER — ACETAMINOPHEN 650 MG/1
650 SUPPOSITORY RECTAL EVERY 4 HOURS PRN
Status: DISCONTINUED | OUTPATIENT
Start: 2025-02-06 | End: 2025-02-08 | Stop reason: HOSPADM

## 2025-02-06 RX ORDER — SODIUM CHLORIDE 0.9 % (FLUSH) 0.9 %
10 SYRINGE (ML) INJECTION EVERY 12 HOURS SCHEDULED
Status: DISCONTINUED | OUTPATIENT
Start: 2025-02-06 | End: 2025-02-08 | Stop reason: HOSPADM

## 2025-02-06 RX ORDER — BISACODYL 5 MG/1
5 TABLET, DELAYED RELEASE ORAL DAILY PRN
Status: DISCONTINUED | OUTPATIENT
Start: 2025-02-06 | End: 2025-02-08 | Stop reason: HOSPADM

## 2025-02-06 RX ORDER — OXYBUTYNIN CHLORIDE 5 MG/1
5 TABLET, EXTENDED RELEASE ORAL DAILY
Status: DISCONTINUED | OUTPATIENT
Start: 2025-02-07 | End: 2025-02-08 | Stop reason: HOSPADM

## 2025-02-06 RX ORDER — ONDANSETRON 4 MG/1
4 TABLET, ORALLY DISINTEGRATING ORAL ONCE
Status: COMPLETED | OUTPATIENT
Start: 2025-02-06 | End: 2025-02-06

## 2025-02-06 RX ORDER — BISACODYL 10 MG
10 SUPPOSITORY, RECTAL RECTAL DAILY PRN
Status: DISCONTINUED | OUTPATIENT
Start: 2025-02-06 | End: 2025-02-08 | Stop reason: HOSPADM

## 2025-02-06 RX ORDER — RIBOFLAVIN (VITAMIN B2) 100 MG
1 TABLET ORAL DAILY
COMMUNITY

## 2025-02-06 RX ORDER — SODIUM CHLORIDE 0.9 % (FLUSH) 0.9 %
10 SYRINGE (ML) INJECTION AS NEEDED
Status: DISCONTINUED | OUTPATIENT
Start: 2025-02-06 | End: 2025-02-08 | Stop reason: HOSPADM

## 2025-02-06 RX ORDER — IPRATROPIUM BROMIDE AND ALBUTEROL SULFATE 2.5; .5 MG/3ML; MG/3ML
3 SOLUTION RESPIRATORY (INHALATION) EVERY 6 HOURS PRN
Status: DISCONTINUED | OUTPATIENT
Start: 2025-02-06 | End: 2025-02-08 | Stop reason: HOSPADM

## 2025-02-06 RX ORDER — OXYCODONE HYDROCHLORIDE 5 MG/1
5 TABLET ORAL ONCE
Status: COMPLETED | OUTPATIENT
Start: 2025-02-06 | End: 2025-02-06

## 2025-02-06 RX ORDER — ACETAMINOPHEN 160 MG/5ML
650 SOLUTION ORAL EVERY 4 HOURS PRN
Status: DISCONTINUED | OUTPATIENT
Start: 2025-02-06 | End: 2025-02-08 | Stop reason: HOSPADM

## 2025-02-06 RX ORDER — THIAMINE HCL 100 MG
2 TABLET ORAL DAILY
COMMUNITY

## 2025-02-06 RX ORDER — MONTELUKAST SODIUM 10 MG/1
10 TABLET ORAL NIGHTLY
Status: DISCONTINUED | OUTPATIENT
Start: 2025-02-06 | End: 2025-02-08 | Stop reason: HOSPADM

## 2025-02-06 RX ORDER — CYCLOBENZAPRINE HCL 10 MG
5 TABLET ORAL NIGHTLY
Status: DISCONTINUED | OUTPATIENT
Start: 2025-02-06 | End: 2025-02-08 | Stop reason: HOSPADM

## 2025-02-06 RX ORDER — ENOXAPARIN SODIUM 100 MG/ML
40 INJECTION SUBCUTANEOUS DAILY
Status: DISCONTINUED | OUTPATIENT
Start: 2025-02-07 | End: 2025-02-08 | Stop reason: HOSPADM

## 2025-02-06 RX ORDER — IOPAMIDOL 612 MG/ML
85 INJECTION, SOLUTION INTRAVASCULAR
Status: COMPLETED | OUTPATIENT
Start: 2025-02-06 | End: 2025-02-06

## 2025-02-06 RX ORDER — POLYETHYLENE GLYCOL 3350 17 G/17G
17 POWDER, FOR SOLUTION ORAL DAILY PRN
Status: DISCONTINUED | OUTPATIENT
Start: 2025-02-06 | End: 2025-02-08 | Stop reason: HOSPADM

## 2025-02-06 RX ORDER — IOPAMIDOL 755 MG/ML
75 INJECTION, SOLUTION INTRAVASCULAR
Status: COMPLETED | OUTPATIENT
Start: 2025-02-06 | End: 2025-02-06

## 2025-02-06 RX ORDER — HYDROMORPHONE HYDROCHLORIDE 1 MG/ML
0.5 INJECTION, SOLUTION INTRAMUSCULAR; INTRAVENOUS; SUBCUTANEOUS
Status: DISCONTINUED | OUTPATIENT
Start: 2025-02-06 | End: 2025-02-08 | Stop reason: HOSPADM

## 2025-02-06 RX ORDER — LEVOTHYROXINE SODIUM 75 UG/1
75 TABLET ORAL
Status: DISCONTINUED | OUTPATIENT
Start: 2025-02-07 | End: 2025-02-07

## 2025-02-06 RX ORDER — AMOXICILLIN 250 MG
2 CAPSULE ORAL 2 TIMES DAILY PRN
Status: DISCONTINUED | OUTPATIENT
Start: 2025-02-06 | End: 2025-02-08 | Stop reason: HOSPADM

## 2025-02-06 RX ORDER — CYCLOSPORINE 0.5 MG/ML
2 EMULSION OPHTHALMIC EVERY 12 HOURS
COMMUNITY
Start: 2024-10-16

## 2025-02-06 RX ADMIN — SODIUM CHLORIDE, SODIUM LACTATE, POTASSIUM CHLORIDE, CALCIUM CHLORIDE 1000 ML: 20; 30; 600; 310 INJECTION, SOLUTION INTRAVENOUS at 16:28

## 2025-02-06 RX ADMIN — Medication 10 ML: at 21:53

## 2025-02-06 RX ADMIN — IOPAMIDOL 75 ML: 755 INJECTION, SOLUTION INTRAVENOUS at 21:16

## 2025-02-06 RX ADMIN — CYCLOBENZAPRINE HYDROCHLORIDE 5 MG: 10 TABLET, FILM COATED ORAL at 21:53

## 2025-02-06 RX ADMIN — MONTELUKAST 10 MG: 10 TABLET, FILM COATED ORAL at 21:53

## 2025-02-06 RX ADMIN — OXYCODONE 5 MG: 5 TABLET ORAL at 14:43

## 2025-02-06 RX ADMIN — KETOROLAC TROMETHAMINE 15 MG: 15 INJECTION, SOLUTION INTRAMUSCULAR; INTRAVENOUS at 16:28

## 2025-02-06 RX ADMIN — ONDANSETRON 4 MG: 4 TABLET, ORALLY DISINTEGRATING ORAL at 14:43

## 2025-02-06 RX ADMIN — IOPAMIDOL 85 ML: 612 INJECTION, SOLUTION INTRAVENOUS at 15:11

## 2025-02-06 NOTE — ED NOTES
Arely Mesa    Nursing Report ED to Floor:  Mental status: alert and oriented x4  Ambulatory status: up ad amanda  Oxygen Therapy:  room air  Cardiac Rhythm: NSR  Admitted from: ED  Safety Concerns:  n/a  Precautions: n/a  Social Issues: n/a  ED Room #:  2    ED Nurse Phone Extension - [phone] or may call [phone].      HPI:   Chief Complaint   Patient presents with    Abdominal Pain       Past Medical History:  Past Medical History:   Diagnosis Date    Asthma     Fibroids     Genital HSV     Hypercholesteremia     Off meds ~     Hypothyroid 2003    Meniere disease, right along with tinnitus     Tubular adenoma of colon 2015    f/u scope 2018 normal    Tubular adenoma of colon 2023        Past Surgical History:  Past Surgical History:   Procedure Laterality Date     SECTION  1985    ORIF PATELLA FRACTURE Left 2018    RHINOPLASTY  1990    TONSILLECTOMY  1964    TRIGGER FINGER RELEASE Left 2013    index finger        Admitting Doctor:   Elina May MD    Consulting Provider(s):  Consults       No orders found from 2025 to 2025.             Admitting Diagnosis:       Most Recent Vitals:   Vitals:    25 1700 25 1730 25 1800 25 1830   BP: 158/99 149/79 163/91 (!) 169/101   BP Location:       Patient Position:       Pulse: 76 73 108    Resp:       Temp:       TempSrc:       SpO2: 97% 98% 98%    Weight:       Height:           Active LDAs/IV Access:   Lines, Drains & Airways       Active LDAs       Name Placement date Placement time Site Days    Peripheral IV 25 1447 Anterior;Distal;Right Forearm 25  1447  Forearm  less than 1                    Labs (abnormal labs have a star):   Labs Reviewed   COMPREHENSIVE METABOLIC PANEL - Abnormal; Notable for the following components:       Result Value    Glucose 102 (*)     Sodium 132 (*)     Chloride 94 (*)     All other components within normal limits    Narrative:     GFR Categories  in Chronic Kidney Disease (CKD)      GFR Category          GFR (mL/min/1.73)    Interpretation  G1                     90 or greater         Normal or high (1)  G2                      60-89                Mild decrease (1)  G3a                   45-59                Mild to moderate decrease  G3b                   30-44                Moderate to severe decrease  G4                    15-29                Severe decrease  G5                    14 or less           Kidney failure          (1)In the absence of evidence of kidney disease, neither GFR category G1 or G2 fulfill the criteria for CKD.    eGFR calculation 2021 CKD-EPI creatinine equation, which does not include race as a factor   URINALYSIS W/ MICROSCOPIC IF INDICATED (NO CULTURE) - Abnormal; Notable for the following components:    Ketones, UA 15 mg/dL (1+) (*)     Protein, UA 30 mg/dL (1+) (*)     Leuk Esterase, UA Trace (*)     All other components within normal limits   TROPONIN - Abnormal; Notable for the following components:    HS Troponin T 20 (*)     All other components within normal limits    Narrative:     High Sensitive Troponin T Reference Range:  <14.0 ng/L- Negative Female for AMI  <22.0 ng/L- Negative Male for AMI  >=14 - Abnormal Female indicating possible myocardial injury.  >=22 - Abnormal Male indicating possible myocardial injury.   Clinicians would have to utilize clinical acumen, EKG, Troponin, and serial changes to determine if it is an Acute Myocardial Infarction or myocardial injury due to an underlying chronic condition.        CBC WITH AUTO DIFFERENTIAL - Abnormal; Notable for the following components:    WBC 18.66 (*)     Neutrophil % 84.6 (*)     Lymphocyte % 7.8 (*)     Eosinophil % 0.2 (*)     Neutrophils, Absolute 15.78 (*)     Monocytes, Absolute 1.22 (*)     Immature Grans, Absolute 0.10 (*)     All other components within normal limits   URINALYSIS, MICROSCOPIC ONLY - Abnormal; Notable for the following components:     "WBC, UA 3-5 (*)     All other components within normal limits   HIGH SENSITIVITIY TROPONIN T 1HR - Abnormal; Notable for the following components:    HS Troponin T 32 (*)     Troponin T Numeric Delta 12 (*)     Troponin T % Delta 60 (*)     All other components within normal limits    Narrative:     High Sensitive Troponin T Reference Range:  <14.0 ng/L- Negative Female for AMI  <22.0 ng/L- Negative Male for AMI  >=14 - Abnormal Female indicating possible myocardial injury.  >=22 - Abnormal Male indicating possible myocardial injury.   Clinicians would have to utilize clinical acumen, EKG, Troponin, and serial changes to determine if it is an Acute Myocardial Infarction or myocardial injury due to an underlying chronic condition.        COVID-19/FLUA&B/RSV, NP SWAB IN TRANSPORT MEDIA 1 HR TAT - Normal    Narrative:     Fact sheet for providers: https://www.[employer].gov/media/072219/download    Fact sheet for patients: https://www.[employer].gov/media/118272/download    Test performed by PCR.   LIPASE - Normal   LACTIC ACID, PLASMA - Normal   PROCALCITONIN - Normal    Narrative:     As a Marker for Sepsis (Non-Neonates):    1. <0.5 ng/mL represents a low risk of severe sepsis and/or septic shock.  2. >2 ng/mL represents a high risk of severe sepsis and/or septic shock.    As a Marker for Lower Respiratory Tract Infections that require antibiotic therapy:    PCT on Admission    Antibiotic Therapy       6-12 Hrs later    >0.5                Strongly Recommended  >0.25 - <0.5        Recommended   0.1 - 0.25          Discouraged              Remeasure/reassess PCT  <0.1                Strongly Discouraged     Remeasure/reassess PCT    As 28 day mortality risk marker: \"Change in Procalcitonin Result\" (>80% or <=80%) if Day 0 (or Day 1) and Day 4 values are available. Refer to http://www.[employer]-pct-calculator.com    Change in PCT <=80%  A decrease of PCT levels below or equal to 80% defines a positive change in PCT test result " representing a higher risk for 28-day all-cause mortality of patients diagnosed with severe sepsis for septic shock.    Change in PCT >80%  A decrease of PCT levels of more than 80% defines a negative change in PCT result representing a lower risk for 28-day all-cause mortality of patients diagnosed with severe sepsis or septic shock.      C-REACTIVE PROTEIN - Normal   MAGNESIUM - Normal   COVID PRE-OP / PRE-PROCEDURE SCREENING ORDER (NO ISOLATION)    Narrative:     The following orders were created for panel order COVID PRE-OP / PRE-PROCEDURE SCREENING ORDER (NO ISOLATION) - Swab, Nasopharynx.  Procedure                               Abnormality         Status                     ---------                               -----------         ------                     COVID-19, FLU A/B, RSV P...[[id number]]  Normal              Final result                 Please view results for these tests on the individual orders.   D-DIMER, QUANTITATIVE   PROTIME-INR   CANCER ANTIGEN 19-9   CBC AND DIFFERENTIAL    Narrative:     The following orders were created for panel order CBC & Differential.  Procedure                               Abnormality         Status                     ---------                               -----------         ------                     CBC Auto Differential[[id number]]        Abnormal            Final result                 Please view results for these tests on the individual orders.       Meds Given in ED:   Medications   HYDROmorphone (DILAUDID) injection 0.5 mg (has no administration in time range)   ondansetron (ZOFRAN) injection 4 mg (has no administration in time range)   ondansetron ODT (ZOFRAN-ODT) disintegrating tablet 4 mg (4 mg Oral Given 2/6/25 1443)   lactated ringers bolus 1,000 mL (0 mL Intravenous Stopped 2/6/25 1716)   ketorolac (TORADOL) injection 15 mg (15 mg Intravenous Given 2/6/25 1628)   oxyCODONE (ROXICODONE) immediate release tablet 5 mg (5 mg Oral Given 2/6/25 1443)    iopamidol (ISOVUE-300) 61 % injection 85 mL (85 mL Intravenous Given 2/6/25 0088)           Last NIH score:                                                          Dysphagia screening results:        Holli Coma Scale:  No data recorded     CIWA:        Restraint Type:            Isolation Status:  No active isolations

## 2025-02-07 ENCOUNTER — APPOINTMENT (OUTPATIENT)
Dept: ULTRASOUND IMAGING | Facility: HOSPITAL | Age: 70
DRG: 436 | End: 2025-02-07
Payer: COMMERCIAL

## 2025-02-07 ENCOUNTER — APPOINTMENT (OUTPATIENT)
Dept: MRI IMAGING | Facility: HOSPITAL | Age: 70
DRG: 436 | End: 2025-02-07
Payer: COMMERCIAL

## 2025-02-07 LAB
ALBUMIN SERPL-MCNC: 4 G/DL (ref 3.5–5.2)
ALBUMIN/GLOB SERPL: 1.7 G/DL
ALP SERPL-CCNC: 96 U/L (ref 39–117)
ALT SERPL W P-5'-P-CCNC: 15 U/L (ref 1–33)
ANION GAP SERPL CALCULATED.3IONS-SCNC: 8 MMOL/L (ref 5–15)
AST SERPL-CCNC: 25 U/L (ref 1–32)
BASOPHILS # BLD AUTO: 0.05 10*3/MM3 (ref 0–0.2)
BASOPHILS NFR BLD AUTO: 0.4 % (ref 0–1.5)
BILIRUB SERPL-MCNC: 0.4 MG/DL (ref 0–1.2)
BUN SERPL-MCNC: 8 MG/DL (ref 8–23)
BUN/CREAT SERPL: 10.3 (ref 7–25)
CALCIUM SPEC-SCNC: 9 MG/DL (ref 8.6–10.5)
CHLORIDE SERPL-SCNC: 96 MMOL/L (ref 98–107)
CO2 SERPL-SCNC: 30 MMOL/L (ref 22–29)
CREAT SERPL-MCNC: 0.78 MG/DL (ref 0.57–1)
DEPRECATED RDW RBC AUTO: 42.3 FL (ref 37–54)
EGFRCR SERPLBLD CKD-EPI 2021: 82.3 ML/MIN/1.73
EOSINOPHIL # BLD AUTO: 0.1 10*3/MM3 (ref 0–0.4)
EOSINOPHIL NFR BLD AUTO: 0.9 % (ref 0.3–6.2)
ERYTHROCYTE [DISTWIDTH] IN BLOOD BY AUTOMATED COUNT: 12.8 % (ref 12.3–15.4)
GLOBULIN UR ELPH-MCNC: 2.3 GM/DL
GLUCOSE SERPL-MCNC: 91 MG/DL (ref 65–99)
HCT VFR BLD AUTO: 42.6 % (ref 34–46.6)
HGB BLD-MCNC: 14.2 G/DL (ref 12–15.9)
IMM GRANULOCYTES # BLD AUTO: 0.05 10*3/MM3 (ref 0–0.05)
IMM GRANULOCYTES NFR BLD AUTO: 0.4 % (ref 0–0.5)
LYMPHOCYTES # BLD AUTO: 1.47 10*3/MM3 (ref 0.7–3.1)
LYMPHOCYTES NFR BLD AUTO: 13 % (ref 19.6–45.3)
MCH RBC QN AUTO: 29.7 PG (ref 26.6–33)
MCHC RBC AUTO-ENTMCNC: 33.3 G/DL (ref 31.5–35.7)
MCV RBC AUTO: 89.1 FL (ref 79–97)
MONOCYTES # BLD AUTO: 1.06 10*3/MM3 (ref 0.1–0.9)
MONOCYTES NFR BLD AUTO: 9.3 % (ref 5–12)
NEUTROPHILS NFR BLD AUTO: 76 % (ref 42.7–76)
NEUTROPHILS NFR BLD AUTO: 8.62 10*3/MM3 (ref 1.7–7)
NRBC BLD AUTO-RTO: 0 /100 WBC (ref 0–0.2)
PLATELET # BLD AUTO: 278 10*3/MM3 (ref 140–450)
PMV BLD AUTO: 9.4 FL (ref 6–12)
POTASSIUM SERPL-SCNC: 4.6 MMOL/L (ref 3.5–5.2)
PROT SERPL-MCNC: 6.3 G/DL (ref 6–8.5)
RBC # BLD AUTO: 4.78 10*6/MM3 (ref 3.77–5.28)
SODIUM SERPL-SCNC: 134 MMOL/L (ref 136–145)
WBC NRBC COR # BLD AUTO: 11.35 10*3/MM3 (ref 3.4–10.8)

## 2025-02-07 PROCEDURE — C1889 IMPLANT/INSERT DEVICE, NOC: HCPCS

## 2025-02-07 PROCEDURE — 25510000002 GADOBENATE DIMEGLUMINE 529 MG/ML SOLUTION: Performed by: STUDENT IN AN ORGANIZED HEALTH CARE EDUCATION/TRAINING PROGRAM

## 2025-02-07 PROCEDURE — 88307 TISSUE EXAM BY PATHOLOGIST: CPT | Performed by: STUDENT IN AN ORGANIZED HEALTH CARE EDUCATION/TRAINING PROGRAM

## 2025-02-07 PROCEDURE — 0FB13ZX EXCISION OF RIGHT LOBE LIVER, PERCUTANEOUS APPROACH, DIAGNOSTIC: ICD-10-PCS | Performed by: RADIOLOGY

## 2025-02-07 PROCEDURE — A9577 INJ MULTIHANCE: HCPCS | Performed by: STUDENT IN AN ORGANIZED HEALTH CARE EDUCATION/TRAINING PROGRAM

## 2025-02-07 PROCEDURE — 80053 COMPREHEN METABOLIC PANEL: CPT

## 2025-02-07 PROCEDURE — 25010000002 MIDAZOLAM PER 1 MG: Performed by: RADIOLOGY

## 2025-02-07 PROCEDURE — 25010000002 LIDOCAINE 1 % SOLUTION: Performed by: RADIOLOGY

## 2025-02-07 PROCEDURE — 85025 COMPLETE CBC W/AUTO DIFF WBC: CPT

## 2025-02-07 PROCEDURE — 25010000002 FENTANYL CITRATE (PF) 50 MCG/ML SOLUTION: Performed by: RADIOLOGY

## 2025-02-07 PROCEDURE — 25010000002 ONDANSETRON PER 1 MG: Performed by: INTERNAL MEDICINE

## 2025-02-07 PROCEDURE — 74183 MRI ABD W/O CNTR FLWD CNTR: CPT

## 2025-02-07 PROCEDURE — 76942 ECHO GUIDE FOR BIOPSY: CPT

## 2025-02-07 PROCEDURE — 99232 SBSQ HOSP IP/OBS MODERATE 35: CPT | Performed by: STUDENT IN AN ORGANIZED HEALTH CARE EDUCATION/TRAINING PROGRAM

## 2025-02-07 PROCEDURE — 99153 MOD SED SAME PHYS/QHP EA: CPT

## 2025-02-07 PROCEDURE — 99152 MOD SED SAME PHYS/QHP 5/>YRS: CPT

## 2025-02-07 RX ORDER — MIDAZOLAM HYDROCHLORIDE 1 MG/ML
INJECTION, SOLUTION INTRAMUSCULAR; INTRAVENOUS AS NEEDED
Status: COMPLETED | OUTPATIENT
Start: 2025-02-07 | End: 2025-02-07

## 2025-02-07 RX ORDER — LIDOCAINE HYDROCHLORIDE 10 MG/ML
10 INJECTION, SOLUTION INFILTRATION; PERINEURAL ONCE
Status: COMPLETED | OUTPATIENT
Start: 2025-02-07 | End: 2025-02-07

## 2025-02-07 RX ORDER — MIRABEGRON 25 MG/1
25 TABLET, FILM COATED, EXTENDED RELEASE ORAL DAILY
COMMUNITY

## 2025-02-07 RX ORDER — FENTANYL CITRATE 50 UG/ML
INJECTION, SOLUTION INTRAMUSCULAR; INTRAVENOUS AS NEEDED
Status: COMPLETED | OUTPATIENT
Start: 2025-02-07 | End: 2025-02-07

## 2025-02-07 RX ORDER — CALCIUM POLYCARBOPHIL 625 MG
625 TABLET ORAL DAILY
COMMUNITY

## 2025-02-07 RX ORDER — FENTANYL CITRATE 50 UG/ML
INJECTION, SOLUTION INTRAMUSCULAR; INTRAVENOUS
Status: DISPENSED
Start: 2025-02-07 | End: 2025-02-08

## 2025-02-07 RX ORDER — MIDAZOLAM HYDROCHLORIDE 1 MG/ML
INJECTION, SOLUTION INTRAMUSCULAR; INTRAVENOUS
Status: DISPENSED
Start: 2025-02-07 | End: 2025-02-08

## 2025-02-07 RX ORDER — LEVOTHYROXINE SODIUM 75 UG/1
75 TABLET ORAL NIGHTLY
Status: DISCONTINUED | OUTPATIENT
Start: 2025-02-07 | End: 2025-02-08 | Stop reason: HOSPADM

## 2025-02-07 RX ADMIN — MIDAZOLAM HYDROCHLORIDE 1 MG: 1 INJECTION, SOLUTION INTRAMUSCULAR; INTRAVENOUS at 15:22

## 2025-02-07 RX ADMIN — LIDOCAINE HYDROCHLORIDE 10 ML: 10 INJECTION, SOLUTION INFILTRATION; PERINEURAL at 16:11

## 2025-02-07 RX ADMIN — FENTANYL CITRATE 50 MCG: 50 INJECTION, SOLUTION INTRAMUSCULAR; INTRAVENOUS at 15:23

## 2025-02-07 RX ADMIN — MONTELUKAST 10 MG: 10 TABLET, FILM COATED ORAL at 22:11

## 2025-02-07 RX ADMIN — GADOBENATE DIMEGLUMINE 15 ML: 529 INJECTION, SOLUTION INTRAVENOUS at 21:38

## 2025-02-07 RX ADMIN — CYCLOBENZAPRINE HYDROCHLORIDE 5 MG: 10 TABLET, FILM COATED ORAL at 22:11

## 2025-02-07 RX ADMIN — ONDANSETRON 4 MG: 2 INJECTION INTRAMUSCULAR; INTRAVENOUS at 22:11

## 2025-02-07 RX ADMIN — LEVOTHYROXINE SODIUM 75 MCG: 0.07 TABLET ORAL at 22:12

## 2025-02-07 RX ADMIN — FENTANYL CITRATE 50 MCG: 50 INJECTION, SOLUTION INTRAMUSCULAR; INTRAVENOUS at 15:54

## 2025-02-07 RX ADMIN — Medication 10 ML: at 22:11

## 2025-02-07 RX ADMIN — MIDAZOLAM HYDROCHLORIDE 1 MG: 1 INJECTION, SOLUTION INTRAMUSCULAR; INTRAVENOUS at 15:53

## 2025-02-07 NOTE — H&P (VIEW-ONLY)
[facility] Medicine Services  PROGRESS NOTE    Patient Name: Arely Mesa  : 1955  MRN: [id number]    Date of Admission: 2025  Primary Care Physician: Mohini Baeza MD    Subjective   Subjective     CC:  Abdominal pain    HPI:  Denied abdominal pain. No N/V. No chest pain or SOA.  Patient's sister is bedside.      Objective   Objective     Vital Signs:   Temp:  [97.8 °F (36.6 °C)-98.7 °F (37.1 °C)] 98.6 °F (37 °C)  Heart Rate:  [] 89  Resp:  [18] 18  BP: (131-169)/() 137/85     Physical Exam:  Constitutional: No acute distress, awake, alert, sitting up  HENT: NCAT, mucous membranes moist  Respiratory: Clear to auscultation bilaterally, respiratory effort normal   Cardiovascular: RRR, no murmurs  Gastrointestinal: Normoactive bowel sounds, soft, nontender, nondistended  Musculoskeletal: No bilateral ankle edema  Psychiatric: Appropriate affect, cooperative  Neurologic: Alert, moves all extremities, symmetric facies, speech clear  Skin: No rashes    Results Reviewed:  LAB RESULTS:      Lab 25  0518 25  1837 25  1624 25  1433   WBC 11.35*  --   --  18.66*   HEMOGLOBIN 14.2  --   --  13.8   HEMATOCRIT 42.6  --   --  40.9   PLATELETS 278  --   --  304   NEUTROS ABS 8.62*  --   --  15.78*   IMMATURE GRANS (ABS) 0.05  --   --  0.10*   LYMPHS ABS 1.47  --   --  1.45   MONOS ABS 1.06*  --   --  1.22*   EOS ABS 0.10  --   --  0.03   MCV 89.1  --   --  88.7   CRP  --   --   --  <0.30   PROCALCITONIN  --   --   --  0.03   LACTATE  --   --   --  1.7   PROTIME  --  12.8  --   --    D DIMER QUANT  --  0.60  --   --    HSTROP T  --   --  32* 20*         Lab 25  0518 25  1433   SODIUM 134* 132*   POTASSIUM 4.6 3.7   CHLORIDE 96* 94*   CO2 30.0* 27.0   ANION GAP 8.0 11.0   BUN 8 12   CREATININE 0.78 0.77   EGFR 82.3 83.6   GLUCOSE 91 102*   CALCIUM 9.0 9.0   MAGNESIUM  --  1.7         Lab 25  0518 25  1433   TOTAL PROTEIN 6.3 6.7    ALBUMIN 4.0 4.3   GLOBULIN 2.3 2.4   ALT (SGPT) 15 17   AST (SGOT) 25 24   BILIRUBIN 0.4 0.3   ALK PHOS 96 99   LIPASE  --  30         Lab 02/06/25  1837 02/06/25  1624 02/06/25  1433   HSTROP T  --  32* 20*   PROTIME 12.8  --   --    INR 0.95  --   --                  Brief Urine Lab Results  (Last result in the past 365 days)        Color   Clarity   Blood   Leuk Est   Nitrite   Protein   CREAT   Urine HCG        02/06/25 1444 Yellow   Clear   Negative   Trace   Negative   30 mg/dL (1+)                   Microbiology Results Abnormal       None            CT Angiogram Chest    Result Date: 2/6/2025  CT ANGIOGRAM CHEST Date of Exam: 2/6/2025 9:04 PM EST Indication: Pancreatic lesions suspicious for malignancy.  R/O any cardiothoracic involvement. Comparison: CT abdomen pelvis 2/6/2025 Technique: CTA of the chest was performed before and after the uneventful intravenous administration of 75 mL Isovue-370. Reconstructed coronal and sagittal images were also obtained. In addition, a 3-D volume rendered image was created for interpretation. Automated exposure control and iterative reconstruction methods were used. Findings: Visualized soft tissues of the lower neck are without acute abnormality. There is normal contrast opacification of the aortic arch branch vasculature. The heart is mildly enlarged. Coronary artery calcification is noted. Negative for pericardial effusion  or pleural effusion. Negative for pulmonary embolus. No mediastinal or hilar adenopathy. No axillary adenopathy. Trachea and mainstem bronchi are patent. There is no consolidation or findings of pneumonia. Negative for pneumothorax. Right lower lobe 4 mm nodule abutting the pulmonary fissure likely benign intrapulmonary lymph node (7/56). Nonspecific right upper lobe 2 mm nodule (7/39). Right upper lobe anterior segment 3 mm nodule (7/40). Nodule in the medial anterior right upper lobe measures 5 mm (7/56). Tiny left lower lobe 2 mm nodule  (7/60). Please see separately dictated abdominal CT for findings below the diaphragm including pancreatic mass and hepatic and splenic lesions. No aggressive osseous lesion. No acute fracture.     Impression: Impression: 1. No acute intrathoracic process. 2. No intrathoracic adenopathy or definite findings of intrathoracic metastatic disease. 3. Several small scattered pulmonary nodules largest in anterior right upper lobe measuring 5 mm, recommend attention on follow-up. 4. Please see separate dictated abdominal CT for findings below the diaphragm. Electronically Signed: Mike Quinteros MD  2/6/2025 9:47 PM EST  Workstation ID: [id number]    CT Abdomen Pelvis With Contrast    Result Date: 2/6/2025  CT ABDOMEN PELVIS W CONTRAST Date of Exam: 2/6/2025 3:09 PM EST Indication: severe left abdominal/flank pain, wretching. Comparison: None available. Technique: Axial CT images were obtained of the abdomen and pelvis following the uneventful intravenous administration of 85 mL Isovue-300. Reconstructed coronal and sagittal images were also obtained. Automated exposure control and iterative construction methods were used. Findings: Liver: The liver is unremarkable in morphology. There are numerous indeterminate liver masses measuring up to 1.6 cm. No biliary dilation is seen. Gallbladder: Unremarkable. Pancreas: Suggestion of a 2.6 cm mass within the body of the pancreas with associated coarse calcification (series 2, image 40). There is upstream pancreatic ductal dilation with atrophy of the pancreatic tail. Head and neck of the pancreas appear grossly unremarkable with decompressed pancreatic duct. Spleen: Numerous small, indeterminate splenic hypodensities Adrenal glands: 3.3 cm mass or enlarged lymph node adjacent to the left adrenal gland. Right adrenal gland is unremarkable. Genitourinary tract: The kidneys are unremarkable. No hydronephrosis is seen. The visualized portions of the ureters and urinary bladder appear  unremarkable. Pelvic organs demonstrate no acute abnormality. Gastrointestinal tract: Colonic diverticulosis is present. Hollow viscera appear otherwise unremarkable. There is no evidence of bowel obstruction. Appendix: No findings to suggest acute appendicitis. Other findings: Multiple enlarged upper abdominal lymph nodes. Enlarged aortocaval lymph node measures 17 mm short axis (series 2, image 49). Additional prominent retroperitoneal lymph nodes are seen. Several prominent periportal and peripancreatic lymph  nodes. Bones and soft tissues: No acute or suspicious osseous or soft tissue lesion is identified. Lung bases: The visualized lung bases are clear.     Impression: Impression: 1.2.6 cm mass within the body of the pancreas with associated coarse calcification. There is upstream pancreatic ductal dilation with atrophy of the pancreatic tail. Appearance is concerning for primary pancreatic malignancy. 2.Numerous small hypoenhancing masses within the liver and spleen, concerning for metastatic lesions. 3.Upper abdominal/retroperitoneal lymphadenopathy, including a 3.3 cm mass adjacent to the left adrenal gland. 4.Recommend pancreatic protocol MRI with and without IV contrast for further characterization of these findings. Electronically Signed: Eitan Martin MD  2/6/2025 3:33 PM EST  Workstation ID: [id number]         Current medications:  Scheduled Meds:cyclobenzaprine, 5 mg, Oral, Nightly  [Held by provider] enoxaparin, 40 mg, Subcutaneous, Daily  levothyroxine, 75 mcg, Oral, Nightly  montelukast, 10 mg, Oral, Nightly  oxybutynin XL, 5 mg, Oral, Daily  sodium chloride, 10 mL, Intravenous, Q12H      Continuous Infusions:Pharmacy Consult,       PRN Meds:.  acetaminophen **OR** acetaminophen **OR** acetaminophen    senna-docusate sodium **AND** polyethylene glycol **AND** bisacodyl **AND** bisacodyl    Calcium Replacement - Follow Nurse / BPA Driven Protocol    HYDROmorphone    ipratropium-albuterol    Magnesium  Standard Dose Replacement - Follow Nurse / BPA Driven Protocol    ondansetron    Pharmacy Consult    Phosphorus Replacement - Follow Nurse / BPA Driven Protocol    Potassium Replacement - Follow Nurse / BPA Driven Protocol    sodium chloride    Assessment & Plan   Assessment & Plan     Active Hospital Problems    Diagnosis  POA    **Pancreatic mass [K86.89]  Yes    Overactive bladder [N32.81]  Unknown    Hypothyroidism (acquired) [E03.9]  Yes      Resolved Hospital Problems   No resolved problems to display.        Brief Hospital Course to date:  Arely Mesa is a 69 y.o. female with significant medical history for Ménière's disease, asthma, HLD, and hypothyroidism, who presented to [facility] for evaluation of abdominal pain.  Found to have likely metastatic pancreatic cancer.     This patient's problems and plans were partially entered by my partner and updated as appropriate by me 02/07/25.     Pancreatic mass w metastasis, suspicious for malignancy  -CT reveals 2.6 cm pancreatic mass, concerning for primary pancreatic malignancy.  Numerous masses on liver and spleen.  0.3 cm mass left adrenal gland.  -CT of chest with multiple nodules, NO PE  -Discussed imaging results with patient  -CA 19-9 15  -IR biopsy ordered, plan for later today  -Heme-onc consulted, discussed with Dr. Negrete  -If CBC and CMP are okay tomorrow if pain controlled, then plan to discharge home tomorrow     Asthma  -DuoNebs as needed     Hypothyroidism  -Continue levothyroxine     Overactive bladder  -Continue oxybutynin    Patient is a physician at [employer]  Pharmacy consulted for med rec    Expected Discharge Location and Transportation: Home  Expected Discharge   Expected Discharge Date: 2/8/2025; Expected Discharge Time: 12:00 PM     VTE Prophylaxis:  Pharmacologic VTE prophylaxis orders are present.         AM-PAC 6 Clicks Score (PT): 24 (02/06/25 2038)    CODE STATUS:   Code Status and Medical  Interventions: CPR (Attempt to Resuscitate); Full Support   Ordered at: 02/06/25 2018     Level Of Support Discussed With:    Patient     Code Status (Patient has no pulse and is not breathing):    CPR (Attempt to Resuscitate)     Medical Interventions (Patient has pulse or is breathing):    Full Support       Dalila Mendoza MD  02/07/25

## 2025-02-07 NOTE — PROCEDURES
The following procedure was performed: Liver tumor biopsy    The tumors were not well visualized on 2 different US machines used by the US tech and myself. I biopsied a targetoid lesion in S8 of liver.    If bx is non-diagnostic, consider CT guided biopsy.    Please see corresponding Radiology report for in detail procedural information. The Radiology report will be dictated shortly, if not done so already. Please see the IR RN note for the information regarding medicines administered if any, tunde-procedural vitals and I/O information.

## 2025-02-07 NOTE — CONSULTS
HEMATOLOGY/ONCOLOGY INPATIENT CONSULT    REASON FOR CONSULT: Subxiphoid pain through to her back now resolved    Subjective   HISTORY OF PRESENT ILLNESS; I am asked to see this 69 y.o.  female, referred for probable pancreatic cancer with mass in the pancreatic head with tail atrophy and splenic and liver and retroperitoneal beata involvement and possibly adrenal mass versus additional node and possibly lung involvement with small lung nodules.  Been asymptomatic until rapid onset subxiphoid abdominal pain taking her to the emergency room and imaging found results as outlined below      Past Medical History:   Diagnosis Date    Asthma     Fibroids     Genital HSV     Hypercholesteremia     Off meds ~     Hypothyroid 2003    Meniere disease, right along with tinnitus     Tubular adenoma of colon 2015    f/u scope  normal    Tubular adenoma of colon 2023     Past Surgical History:   Procedure Laterality Date     SECTION  1985    ORIF PATELLA FRACTURE Left 2018    RHINOPLASTY  1990    TONSILLECTOMY  1964    TRIGGER FINGER RELEASE Left 2013    index finger       No current facility-administered medications on file prior to encounter.     Current Outpatient Medications on File Prior to Encounter   Medication Sig Dispense Refill    Calcium Citrate-Vitamin D3 (CITRACAL) 315-6.25 MG-MCG tablet tablet Take 2 tablets by mouth Daily.      cetirizine (zyrTEC) 10 MG tablet Take 1 tablet by mouth Daily.      cycloSPORINE (RESTASIS) 0.05 % ophthalmic emulsion Apply 2 drops to eye(s) as directed by provider Every 12 (Twelve) Hours.      Duavee 0.45-20 MG tablet Take 1 each by mouth Daily. 90 tablet 2    fluticasone (FLONASE) 50 MCG/ACT nasal spray Administer 2 sprays into the nostril(s) as directed by provider Daily.      glucosamine-chondroitin 500-400 MG per tablet Take 1 tablet by mouth Daily.      levothyroxine (SYNTHROID, LEVOTHROID) 75 MCG tablet Take 1 tablet by mouth  "Daily.      melatonin 5 MG tablet tablet Take 2 tablets by mouth Every Night. Takes 1-2 tablets      montelukast (SINGULAIR) 10 MG tablet Take 1 tablet by mouth Every Night.      TURMERIC PO Take 553 mg by mouth Daily.      [DISCONTINUED] Mirabegron ER (Myrbetriq) 50 MG tablet sustained-release 24 hour 24 hr tablet Take 50 mg by mouth Daily. 30 tablet 4    Mirabegron ER (Myrbetriq) 25 MG tablet sustained-release 24 hour 24 hr tablet Take 1 tablet by mouth Daily.      polycarbophil (calcium polycarbophil) 625 MG tablet tablet Take 1 tablet by mouth Daily. Takes 1-2 times daily         Allergies   Allergen Reactions    Hydrocodone-Acetaminophen Unknown (See Comments)       Social History     Socioeconomic History    Marital status:     Number of children: 2   Tobacco Use    Smoking status: Never   Vaping Use    Vaping status: Never Used   Substance and Sexual Activity    Alcohol use: Yes     Alcohol/week: 2.0 standard drinks of alcohol     Types: 2 Glasses of wine per week    Drug use: No    Sexual activity: Not Currently       Family History   Problem Relation Age of Onset    Uterine cancer Mother     Pancreatic cancer Paternal Grandmother          REVIEW OF SYSTEMS:  Subxiphoid pain resolved and denies jaundice or weight loss or other constitutional complaints    Objective   PHYSICAL EXAM: No jaundice icterus or pallor.  No respiratory distress.    /75   Pulse 89   Temp 98.6 °F (37 °C) (Oral)   Resp 15   Ht 165.1 cm (65\")   Wt 59.9 kg (132 lb)   LMP 02/23/2014 (Approximate)   SpO2 96%   BMI 21.97 kg/m²               ECOG: (0) Fully Active - Able to Carry On All Pre-disease Performance Without Restriction  General: well appearing female in no acute distress  HEENT: sclerae anicteric, oropharynx clear  Lymphatics: no cervical, supraclavicular, inguinal, or axillary adenopathy  Neck: Supple. No thyromegaly.  Cardiovascular: regular rate and rhythm, no murmurs  Lungs: clear to auscultation " bilaterally. No respiratory distress  Abdomen: soft, nontender, nondistended.  No palpable organomegaly  Extremities: no lower extremity edema, cyanosis, or clubbing  Skin: no rashes, lesions, bruising, or petechiae  Neuro: Alert and oriented x3. Moves all extremities.    Results:    Results from last 7 days   Lab Units 02/07/25  0518 02/06/25  1433   WBC 10*3/mm3 11.35* 18.66*   HEMOGLOBIN g/dL 14.2 13.8   PLATELETS 10*3/mm3 278 304     Results from last 7 days   Lab Units 02/07/25  0518 02/06/25  1433   SODIUM mmol/L 134* 132*   POTASSIUM mmol/L 4.6 3.7   CO2 mmol/L 30.0* 27.0   BUN mg/dL 8 12   CREATININE mg/dL 0.78 0.77   GLUCOSE mg/dL 91 102*     Results from last 7 days   Lab Units 02/07/25  0518 02/06/25  1433   AST (SGOT) U/L 25 24   ALT (SGPT) U/L 15 17   BILIRUBIN mg/dL 0.4 0.3   ALK PHOS U/L 96 99         CT Angiogram Chest    Result Date: 2/6/2025  Impression: 1. No acute intrathoracic process. 2. No intrathoracic adenopathy or definite findings of intrathoracic metastatic disease. 3. Several small scattered pulmonary nodules largest in anterior right upper lobe measuring 5 mm, recommend attention on follow-up. 4. Please see separate dictated abdominal CT for findings below the diaphragm. Electronically Signed: Mike Quinteros MD  2/6/2025 9:47 PM EST  Workstation ID: [id number]    CT Abdomen Pelvis With Contrast    Result Date: 2/6/2025  Impression: 1.2.6 cm mass within the body of the pancreas with associated coarse calcification. There is upstream pancreatic ductal dilation with atrophy of the pancreatic tail. Appearance is concerning for primary pancreatic malignancy. 2.Numerous small hypoenhancing masses within the liver and spleen, concerning for metastatic lesions. 3.Upper abdominal/retroperitoneal lymphadenopathy, including a 3.3 cm mass adjacent to the left adrenal gland. 4.Recommend pancreatic protocol MRI with and without IV contrast for further characterization of these findings. Electronically  Signed: Eitan Martin MD  2/6/2025 3:33 PM EST  Workstation ID: [id number]     Assessment    ASSESSMENT & PLAN:    1.  Pancreatic mass with multiple liver and splenic and possible lung metastases per 2/6/2025 CT angiogram chest and CT abdomen pelvis done in emergency room for evaluation of abdominal pain.  Normal CA 19-9.  2.6 cm pancreatic body mass with coarse calcification and upstream ductal dilation with atrophy of the pancreatic tail, 3.3 cm mass adjacent left adrenal which could be lymph node or adrenal mass and numerous hypoenhancing lesions in the liver and spleen with upper abdominal retroperitoneal adenopathy.  Unremarkable CBC and CMP  2.  Asthma  3.  Hypothyroidism  4.  Overactive bladder  5.  Right-sided Ménière's with long history of eustachian tube dysfunction  6.  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  History of hordeolum left eye  8.  Mixed conductive and sensorineural hearing loss  9.  Raynaud's with CCP, rheumatoid factor, and JACQUES negative inflammatory arthritis  10.  History of genital HSV  11.  Hyperlipidemia  12.  History of tubular adenomatous colon polyps    -2/7/2025 [facility] medical oncology inpatient consult: Patient had fairly abrupt onset subxiphoid pain through to her back and went to emergency room for imaging results of which are outlined above.  This is concerning for malignancy.  The CA 19-9 negativity could be due to Pratik antigen negativity or she could, given the somewhat odd appearance of involvement not only of liver but of retroperitoneal nodes and spleen and possibly lungs that this could be either other malignancy or lymphoma or a pancreatic neuroendocrine tumor.  Obviously discernment of the exact subtype of malignancy is necessary before making palliative systemic therapy decisions.  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  She has had a ultrasound-guided liver biopsy today.  Unfortunately it would not surprise me if this was nondiagnostic given the fairly non-discrete inhomogeneous pattern of this malignancy.  I will see her back in a week to go over the results of pathology and will send for [employer] sequencing as well as peripheral blood for guardant 360 liquid GPS and if there are discrepancies on the origin on pathology we will send for a tissue of origin assay.  She understands that what ever we are doing is palliative in nature and presently her pain has completely resolved.  I will order MRI pancreas protocol hopefully be done tonight or in the morning and then she can be discharged to follow-up with me the end of next week.  Pancreas cancer confirmed she will need genetic testing but said she had 23 and me in the past that showed she was BRCA negative.    Total time of care today inclusive of time spent today discussing her case with Dr. Mendoza and reviewing images and going over the plan as outlined above with the patient and her sister with whom I have worked in the past as an oncology nurse and after visit instituting this plan took 1 hour patient care time throughout the day today.      Sam Negrete MD    2/7/2025

## 2025-02-07 NOTE — INTERVAL H&P NOTE
H&P reviewed. The patient was examined and there are no changes to the H&P.      ASA: 3  Mallampati: 1

## 2025-02-07 NOTE — NURSING NOTE
US guided liver biopsy performed by Dr Whittington. 3 core biopsies obtained & taken to lab by tech. Patient was given 100 mcg Fentanyl & 2 mg Versed with sedation time of 41 minutes. Patient tolerated well & placed on right side with bedrest orders. Report called to nurse, spoke with Mere.

## 2025-02-07 NOTE — CASE MANAGEMENT/SOCIAL WORK
Discharge Planning Assessment  [facility]     Patient Name: Arely Mesa  MRN: [id number]  Today's Date: 2/7/2025    Admit Date: 2/6/2025    Plan: home   Discharge Needs Assessment       Row Name 02/07/25 1212       Living Environment    People in Home alone    Current Living Arrangements home    Potentially Unsafe Housing Conditions none    In the past 12 months has the electric, gas, oil, or water company threatened to shut off services in your home? No    Primary Care Provided by self    Provides Primary Care For pet(s)    Family Caregiver if Needed sibling(s);child(jil), adult    Quality of Family Relationships helpful;involved    Able to Return to Prior Arrangements yes       Resource/Environmental Concerns    Resource/Environmental Concerns none       Transportation Needs    In the past 12 months, has lack of transportation kept you from medical appointments or from getting medications? no    In the past 12 months, has lack of transportation kept you from meetings, work, or from getting things needed for daily living? No       Food Insecurity    Within the past 12 months, you worried that your food would run out before you got the money to buy more. Never true    Within the past 12 months, the food you bought just didn't last and you didn't have money to get more. Never true       Transition Planning    Patient/Family Anticipates Transition to home    Patient/Family Anticipated Services at Transition none    Transportation Anticipated family or friend will provide       Discharge Needs Assessment    Readmission Within the Last 30 Days no previous admission in last 30 days    Equipment Currently Used at Home walker, rolling;shower chair    Concerns to be Addressed no discharge needs identified;denies needs/concerns at this time                   Discharge Plan       Row Name 02/07/25 1212       Plan    Plan home    Patient/Family in Agreement with Plan yes    Plan Comments Met with patient and her sister  at the bedside to initiate discharge planning. Patient lives with her 2 cats in a house in [place]. Patient is independent with ADLs and uses no DME at baseline. Patient has a rolling walker and bedside commode at home just in case. Patient denies any home health services or home oxygen use. Patient has [employer] insurance with prescription benefits and prefers to fill scripts at the [facility]. Patient's plan is home at discharge. Her sister will transport. CM will continue to follow.    Final Discharge Disposition Code 01 - home or self-care                  Continued Care and Services - Admitted Since 2/6/2025    No active coordination exists for this encounter.       Expected Discharge Date and Time       Expected Discharge Date Expected Discharge Time    Feb 8, 2025 12:00 PM           Demographic Summary       Row Name 02/07/25 1211       General Information    Admission Type inpatient    Arrived From home    Referral Source physician    Reason for Consult discharge planning    Preferred Language English    General Information Comments PCP Mohini Baeza       Contact Information    Permission Granted to Share Info With family/designee    Contact Information Comments Marcelo Callahan (Son)  [phone] (Home Phone)                   Functional Status       Row Name 02/07/25 1211       Functional Status    Usual Activity Tolerance excellent    Current Activity Tolerance good       Functional Status, IADL    Medications independent    Meal Preparation independent    Housekeeping independent    Laundry independent    Shopping independent       Mental Status    General Appearance WDL WDL       Mental Status Summary    Recent Changes in Mental Status/Cognitive Functioning no changes       Employment/    Employment Status employed full-time                   Psychosocial    No documentation.                  Abuse/Neglect    No documentation.                  Legal    No documentation.                   Substance Abuse    No documentation.                  Patient Forms    No documentation.                     Melany Dai RN

## 2025-02-07 NOTE — PROGRESS NOTES
[facility] Medicine Services  PROGRESS NOTE    Patient Name: Arely Mesa  : 1955  MRN: [id number]    Date of Admission: 2025  Primary Care Physician: Mohini Baeza MD    Subjective   Subjective     CC:  Abdominal pain    HPI:  Denied abdominal pain. No N/V. No chest pain or SOA.  Patient's sister is bedside.      Objective   Objective     Vital Signs:   Temp:  [97.8 °F (36.6 °C)-98.7 °F (37.1 °C)] 98.6 °F (37 °C)  Heart Rate:  [] 89  Resp:  [18] 18  BP: (131-169)/() 137/85     Physical Exam:  Constitutional: No acute distress, awake, alert, sitting up  HENT: NCAT, mucous membranes moist  Respiratory: Clear to auscultation bilaterally, respiratory effort normal   Cardiovascular: RRR, no murmurs  Gastrointestinal: Normoactive bowel sounds, soft, nontender, nondistended  Musculoskeletal: No bilateral ankle edema  Psychiatric: Appropriate affect, cooperative  Neurologic: Alert, moves all extremities, symmetric facies, speech clear  Skin: No rashes    Results Reviewed:  LAB RESULTS:      Lab 25  0518 25  1837 25  1624 25  1433   WBC 11.35*  --   --  18.66*   HEMOGLOBIN 14.2  --   --  13.8   HEMATOCRIT 42.6  --   --  40.9   PLATELETS 278  --   --  304   NEUTROS ABS 8.62*  --   --  15.78*   IMMATURE GRANS (ABS) 0.05  --   --  0.10*   LYMPHS ABS 1.47  --   --  1.45   MONOS ABS 1.06*  --   --  1.22*   EOS ABS 0.10  --   --  0.03   MCV 89.1  --   --  88.7   CRP  --   --   --  <0.30   PROCALCITONIN  --   --   --  0.03   LACTATE  --   --   --  1.7   PROTIME  --  12.8  --   --    D DIMER QUANT  --  0.60  --   --    HSTROP T  --   --  32* 20*         Lab 25  0518 25  1433   SODIUM 134* 132*   POTASSIUM 4.6 3.7   CHLORIDE 96* 94*   CO2 30.0* 27.0   ANION GAP 8.0 11.0   BUN 8 12   CREATININE 0.78 0.77   EGFR 82.3 83.6   GLUCOSE 91 102*   CALCIUM 9.0 9.0   MAGNESIUM  --  1.7         Lab 25  0518 25  1433   TOTAL PROTEIN 6.3 6.7    ALBUMIN 4.0 4.3   GLOBULIN 2.3 2.4   ALT (SGPT) 15 17   AST (SGOT) 25 24   BILIRUBIN 0.4 0.3   ALK PHOS 96 99   LIPASE  --  30         Lab 02/06/25  1837 02/06/25  1624 02/06/25  1433   HSTROP T  --  32* 20*   PROTIME 12.8  --   --    INR 0.95  --   --                  Brief Urine Lab Results  (Last result in the past 365 days)        Color   Clarity   Blood   Leuk Est   Nitrite   Protein   CREAT   Urine HCG        02/06/25 1444 Yellow   Clear   Negative   Trace   Negative   30 mg/dL (1+)                   Microbiology Results Abnormal       None            CT Angiogram Chest    Result Date: 2/6/2025  CT ANGIOGRAM CHEST Date of Exam: 2/6/2025 9:04 PM EST Indication: Pancreatic lesions suspicious for malignancy.  R/O any cardiothoracic involvement. Comparison: CT abdomen pelvis 2/6/2025 Technique: CTA of the chest was performed before and after the uneventful intravenous administration of 75 mL Isovue-370. Reconstructed coronal and sagittal images were also obtained. In addition, a 3-D volume rendered image was created for interpretation. Automated exposure control and iterative reconstruction methods were used. Findings: Visualized soft tissues of the lower neck are without acute abnormality. There is normal contrast opacification of the aortic arch branch vasculature. The heart is mildly enlarged. Coronary artery calcification is noted. Negative for pericardial effusion  or pleural effusion. Negative for pulmonary embolus. No mediastinal or hilar adenopathy. No axillary adenopathy. Trachea and mainstem bronchi are patent. There is no consolidation or findings of pneumonia. Negative for pneumothorax. Right lower lobe 4 mm nodule abutting the pulmonary fissure likely benign intrapulmonary lymph node (7/56). Nonspecific right upper lobe 2 mm nodule (7/39). Right upper lobe anterior segment 3 mm nodule (7/40). Nodule in the medial anterior right upper lobe measures 5 mm (7/56). Tiny left lower lobe 2 mm nodule  (7/60). Please see separately dictated abdominal CT for findings below the diaphragm including pancreatic mass and hepatic and splenic lesions. No aggressive osseous lesion. No acute fracture.     Impression: Impression: 1. No acute intrathoracic process. 2. No intrathoracic adenopathy or definite findings of intrathoracic metastatic disease. 3. Several small scattered pulmonary nodules largest in anterior right upper lobe measuring 5 mm, recommend attention on follow-up. 4. Please see separate dictated abdominal CT for findings below the diaphragm. Electronically Signed: Mike Quinteros MD  2/6/2025 9:47 PM EST  Workstation ID: [id number]    CT Abdomen Pelvis With Contrast    Result Date: 2/6/2025  CT ABDOMEN PELVIS W CONTRAST Date of Exam: 2/6/2025 3:09 PM EST Indication: severe left abdominal/flank pain, wretching. Comparison: None available. Technique: Axial CT images were obtained of the abdomen and pelvis following the uneventful intravenous administration of 85 mL Isovue-300. Reconstructed coronal and sagittal images were also obtained. Automated exposure control and iterative construction methods were used. Findings: Liver: The liver is unremarkable in morphology. There are numerous indeterminate liver masses measuring up to 1.6 cm. No biliary dilation is seen. Gallbladder: Unremarkable. Pancreas: Suggestion of a 2.6 cm mass within the body of the pancreas with associated coarse calcification (series 2, image 40). There is upstream pancreatic ductal dilation with atrophy of the pancreatic tail. Head and neck of the pancreas appear grossly unremarkable with decompressed pancreatic duct. Spleen: Numerous small, indeterminate splenic hypodensities Adrenal glands: 3.3 cm mass or enlarged lymph node adjacent to the left adrenal gland. Right adrenal gland is unremarkable. Genitourinary tract: The kidneys are unremarkable. No hydronephrosis is seen. The visualized portions of the ureters and urinary bladder appear  unremarkable. Pelvic organs demonstrate no acute abnormality. Gastrointestinal tract: Colonic diverticulosis is present. Hollow viscera appear otherwise unremarkable. There is no evidence of bowel obstruction. Appendix: No findings to suggest acute appendicitis. Other findings: Multiple enlarged upper abdominal lymph nodes. Enlarged aortocaval lymph node measures 17 mm short axis (series 2, image 49). Additional prominent retroperitoneal lymph nodes are seen. Several prominent periportal and peripancreatic lymph  nodes. Bones and soft tissues: No acute or suspicious osseous or soft tissue lesion is identified. Lung bases: The visualized lung bases are clear.     Impression: Impression: 1.2.6 cm mass within the body of the pancreas with associated coarse calcification. There is upstream pancreatic ductal dilation with atrophy of the pancreatic tail. Appearance is concerning for primary pancreatic malignancy. 2.Numerous small hypoenhancing masses within the liver and spleen, concerning for metastatic lesions. 3.Upper abdominal/retroperitoneal lymphadenopathy, including a 3.3 cm mass adjacent to the left adrenal gland. 4.Recommend pancreatic protocol MRI with and without IV contrast for further characterization of these findings. Electronically Signed: Eitan Martin MD  2/6/2025 3:33 PM EST  Workstation ID: [id number]         Current medications:  Scheduled Meds:cyclobenzaprine, 5 mg, Oral, Nightly  [Held by provider] enoxaparin, 40 mg, Subcutaneous, Daily  levothyroxine, 75 mcg, Oral, Nightly  montelukast, 10 mg, Oral, Nightly  oxybutynin XL, 5 mg, Oral, Daily  sodium chloride, 10 mL, Intravenous, Q12H      Continuous Infusions:Pharmacy Consult,       PRN Meds:.  acetaminophen **OR** acetaminophen **OR** acetaminophen    senna-docusate sodium **AND** polyethylene glycol **AND** bisacodyl **AND** bisacodyl    Calcium Replacement - Follow Nurse / BPA Driven Protocol    HYDROmorphone    ipratropium-albuterol    Magnesium  Standard Dose Replacement - Follow Nurse / BPA Driven Protocol    ondansetron    Pharmacy Consult    Phosphorus Replacement - Follow Nurse / BPA Driven Protocol    Potassium Replacement - Follow Nurse / BPA Driven Protocol    sodium chloride    Assessment & Plan   Assessment & Plan     Active Hospital Problems    Diagnosis  POA    **Pancreatic mass [K86.89]  Yes    Overactive bladder [N32.81]  Unknown    Hypothyroidism (acquired) [E03.9]  Yes      Resolved Hospital Problems   No resolved problems to display.        Brief Hospital Course to date:  Arely Mesa is a 69 y.o. female with significant medical history for Ménière's disease, asthma, HLD, and hypothyroidism, who presented to [facility] for evaluation of abdominal pain.  Found to have likely metastatic pancreatic cancer.     This patient's problems and plans were partially entered by my partner and updated as appropriate by me 02/07/25.     Pancreatic mass w metastasis, suspicious for malignancy  -CT reveals 2.6 cm pancreatic mass, concerning for primary pancreatic malignancy.  Numerous masses on liver and spleen.  0.3 cm mass left adrenal gland.  -CT of chest with multiple nodules, NO PE  -Discussed imaging results with patient  -CA 19-9 15  -IR biopsy ordered, plan for later today  -Heme-onc consulted, discussed with Dr. Negrete  -If CBC and CMP are okay tomorrow if pain controlled, then plan to discharge home tomorrow     Asthma  -DuoNebs as needed     Hypothyroidism  -Continue levothyroxine     Overactive bladder  -Continue oxybutynin    Patient is a physician at [employer]  Pharmacy consulted for med rec    Expected Discharge Location and Transportation: Home  Expected Discharge   Expected Discharge Date: 2/8/2025; Expected Discharge Time: 12:00 PM     VTE Prophylaxis:  Pharmacologic VTE prophylaxis orders are present.         AM-PAC 6 Clicks Score (PT): 24 (02/06/25 2038)    CODE STATUS:   Code Status and Medical  Interventions: CPR (Attempt to Resuscitate); Full Support   Ordered at: 02/06/25 2018     Level Of Support Discussed With:    Patient     Code Status (Patient has no pulse and is not breathing):    CPR (Attempt to Resuscitate)     Medical Interventions (Patient has pulse or is breathing):    Full Support       Dalila Mendoza MD  02/07/25

## 2025-02-07 NOTE — H&P
[facility] Medicine Services  HISTORY AND PHYSICAL    Patient Name: Arely Mesa  : 1955  MRN: [id number]  Primary Care Physician: Mohini Baeza MD  Date of admission: 2025    Subjective   Subjective     Chief Complaint:  Abdominal pain    HPI:  Arely Mesa is a 69 y.o. female with significant medical history for Ménière's disease, asthma, HLD, and hypothyroidism  who presents to [facility] with complaints of abdominal pain.     Patient reports abdominal pain began on .  She woke up and at approximately midnight with sharp mid epigastric pain that she rates a 5 out of 10. Pain radiates to her left lower abdomen.  She has also experienced nausea, vomiting, and chills.  Additionally she noted some lower back pain, which she attributes to pulling a muscle from hyperemesis.  Patient denies any appetite changes or unexpected weight loss greater than 5 pounds.  She is accompanied by her sister at bedside.      Patient denies headache, fevers, but is positive for chills.  She denies chest pain or shortness of breath.  She denies diarrhea, but is positive for nausea and vomiting.    In ED    Troponin 20, 32    UA negative for UTI.        Review of Systems   Constitutional:  Positive for chills. Negative for activity change, fatigue, fever and unexpected weight change.   HENT: Negative.     Eyes: Negative.    Respiratory:  Negative for cough, chest tightness and shortness of breath.    Cardiovascular:  Negative for chest pain and leg swelling.   Gastrointestinal:  Positive for abdominal pain, nausea and vomiting. Negative for abdominal distention and diarrhea.   Endocrine: Negative.    Genitourinary: Negative.    Musculoskeletal:  Positive for back pain. Negative for arthralgias.   Skin: Negative.    Allergic/Immunologic: Negative.    Neurological:  Negative for dizziness, syncope, light-headedness and headaches.   Hematological: Negative.     Psychiatric/Behavioral: Negative.              Personal History     Past Medical History:   Diagnosis Date    Asthma     Fibroids     Genital HSV 1975    Hypercholesteremia 2008    Off meds ~     Hypothyroid 2003    Meniere disease, right along with tinnitus     Tubular adenoma of colon 2015    f/u scope 2018 normal    Tubular adenoma of colon 2023             Past Surgical History:   Procedure Laterality Date     SECTION  1985    ORIF PATELLA FRACTURE Left 2018    RHINOPLASTY  1990    TONSILLECTOMY  1964    TRIGGER FINGER RELEASE Left 2013    index finger       Family History: family history includes Uterine cancer in her mother.     Social History:  reports that she has never smoked. She does not have any smokeless tobacco history on file. She reports current alcohol use of about 2.0 standard drinks of alcohol per week. She reports that she does not use drugs.  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   HENT:      Head: Normocephalic and atraumatic.   Eyes:      Extraocular Movements: Extraocular movements intact.      Pupils: Pupils are equal, round, and reactive to light.   Cardiovascular:      Rate and Rhythm: Normal rate and regular rhythm.      Pulses: Normal pulses.      Heart sounds: Normal heart sounds.   Pulmonary:      Effort: Pulmonary effort is normal.      Breath sounds: Normal breath sounds.   Abdominal:      General: Bowel sounds are normal. There is no distension.      Palpations: Abdomen is soft.       Tenderness: There is abdominal tenderness.   Musculoskeletal:      Right lower leg: No edema.      Left lower leg: No edema.   Neurological:      General: No focal deficit present.      Mental Status: She is alert and oriented to person, place, and time.   Psychiatric:         Mood and Affect: Mood normal.         Behavior: Behavior normal.        Result Review:  I have personally reviewed the results from the time of this admission to 2/6/2025 20:18 EST and agree with these findings:  [x]  Laboratory list / accordion  []  Microbiology  [x]  Radiology  [x]  EKG/Telemetry   []  Cardiology/Vascular   []  Pathology  []  Old records  []  Other:  Most notable findings include: Elevated troponin, no EKG changes, hyponatremia (unsure of clinical significance), leukocytosis, negative UA, CT of abdomen/chest reveals 2.6 cm mass, suspicious for primary pancreatic malignancy.  Numerous masses within the liver and spleen concerning for metastatic lesions.  Lymph and adenopathy is also present, including 3.3 cm mass adjacent to the left adrenal gland.    LAB RESULTS:      Lab 02/06/25 1837 02/06/25  1433   WBC  --  18.66*   HEMOGLOBIN  --  13.8   HEMATOCRIT  --  40.9   PLATELETS  --  304   NEUTROS ABS  --  15.78*   IMMATURE GRANS (ABS)  --  0.10*   LYMPHS ABS  --  1.45   MONOS ABS  --  1.22*   EOS ABS  --  0.03   MCV  --  88.7   CRP  --  <0.30   PROCALCITONIN  --  0.03   LACTATE  --  1.7   PROTIME 12.8  --    D DIMER QUANT 0.60  --          Lab 02/06/25  1433   SODIUM 132*   POTASSIUM 3.7   CHLORIDE 94*   CO2 27.0   ANION GAP 11.0   BUN 12   CREATININE 0.77   EGFR 83.6   GLUCOSE 102*   CALCIUM 9.0   MAGNESIUM 1.7         Lab 02/06/25  1433   TOTAL PROTEIN 6.7   ALBUMIN 4.3   GLOBULIN 2.4   ALT (SGPT) 17   AST (SGOT) 24   BILIRUBIN 0.3   ALK PHOS 99   LIPASE 30         Lab 02/06/25 1837 02/06/25  1624 02/06/25  1433   HSTROP T  --  32* 20*   PROTIME 12.8  --   --    INR 0.95  --   --                  Brief Urine Lab  Results  (Last result in the past 365 days)        Color   Clarity   Blood   Leuk Est   Nitrite   Protein   CREAT   Urine HCG        02/06/25 1444 Yellow   Clear   Negative   Trace   Negative   30 mg/dL (1+)                 Microbiology Results (last 10 days)       Procedure Component Value - Date/Time    COVID PRE-OP / PRE-PROCEDURE SCREENING ORDER (NO ISOLATION) - Swab, Nasopharynx [[id number]]  (Normal) Collected: 02/06/25 1434    Lab Status: Final result Specimen: Swab from Nasopharynx Updated: 02/06/25 1600    Narrative:      The following orders were created for panel order COVID PRE-OP / PRE-PROCEDURE SCREENING ORDER (NO ISOLATION) - Swab, Nasopharynx.  Procedure                               Abnormality         Status                     ---------                               -----------         ------                     COVID-19, FLU A/B, RSV P...[[id number]]  Normal              Final result                 Please view results for these tests on the individual orders.    COVID-19, FLU A/B, RSV PCR 1 HR TAT - Swab, Nasopharynx [[id number]]  (Normal) Collected: 02/06/25 1434    Lab Status: Final result Specimen: Swab from Nasopharynx Updated: 02/06/25 1600     COVID19 Not Detected     Influenza A PCR Not Detected     Influenza B PCR Not Detected     RSV, PCR Not Detected    Narrative:      Fact sheet for providers: https://www.fda.gov/media/268515/download    Fact sheet for patients: https://www.fda.gov/media/533235/download    Test performed by PCR.            CT Abdomen Pelvis With Contrast    Result Date: 2/6/2025  CT ABDOMEN PELVIS W CONTRAST Date of Exam: 2/6/2025 3:09 PM EST Indication: severe left abdominal/flank pain, wretching. Comparison: None available. Technique: Axial CT images were obtained of the abdomen and pelvis following the uneventful intravenous administration of 85 mL Isovue-300. Reconstructed coronal and sagittal images were also obtained. Automated exposure control and iterative  construction methods were used. Findings: Liver: The liver is unremarkable in morphology. There are numerous indeterminate liver masses measuring up to 1.6 cm. No biliary dilation is seen. Gallbladder: Unremarkable. Pancreas: Suggestion of a 2.6 cm mass within the body of the pancreas with associated coarse calcification (series 2, image 40). There is upstream pancreatic ductal dilation with atrophy of the pancreatic tail. Head and neck of the pancreas appear grossly unremarkable with decompressed pancreatic duct. Spleen: Numerous small, indeterminate splenic hypodensities Adrenal glands: 3.3 cm mass or enlarged lymph node adjacent to the left adrenal gland. Right adrenal gland is unremarkable. Genitourinary tract: The kidneys are unremarkable. No hydronephrosis is seen. The visualized portions of the ureters and urinary bladder appear unremarkable. Pelvic organs demonstrate no acute abnormality. Gastrointestinal tract: Colonic diverticulosis is present. Hollow viscera appear otherwise unremarkable. There is no evidence of bowel obstruction. Appendix: No findings to suggest acute appendicitis. Other findings: Multiple enlarged upper abdominal lymph nodes. Enlarged aortocaval lymph node measures 17 mm short axis (series 2, image 49). Additional prominent retroperitoneal lymph nodes are seen. Several prominent periportal and peripancreatic lymph  nodes. Bones and soft tissues: No acute or suspicious osseous or soft tissue lesion is identified. Lung bases: The visualized lung bases are clear.     Impression: Impression: 1.2.6 cm mass within the body of the pancreas with associated coarse calcification. There is upstream pancreatic ductal dilation with atrophy of the pancreatic tail. Appearance is concerning for primary pancreatic malignancy. 2.Numerous small hypoenhancing masses within the liver and spleen, concerning for metastatic lesions. 3.Upper abdominal/retroperitoneal lymphadenopathy, including a 3.3 cm mass  adjacent to the left adrenal gland. 4.Recommend pancreatic protocol MRI with and without IV contrast for further characterization of these findings. Electronically Signed: Eitan Martin MD  2/6/2025 3:33 PM EST  Workstation ID: [id number]         Assessment & Plan   Assessment & Plan       Pancreatic mass    Hypothyroidism (acquired)    Overactive bladder    Arely Mesa is a 69 y.o. female with significant medical history for Ménière's disease, asthma, HLD, and hypothyroidism, who presents to [facility] with complaints of abdominal pain.       Pancreatic mass, suspicious for malignancy  -CT reveals 2.6 cm pancreatic mass, concerning for primary pancreatic malignancy.  Numerous masses on liver and spleen.  0.3 cm mass left adrenal gland.  -CT of chest with multiple nodules, NO PE  -IR biopsy   -Heme-onc consult  -CBC CMP in a.m.    Asthma  -DuoNebs as needed    Hypothyroidism  -Continue levothyroxine    Overactive bladder  -Continue oxybutynin    DVT prophylaxis:  Lovenox     CODE STATUS:  full code   Level Of Support Discussed With: Patient  Code Status (Patient has no pulse and is not breathing): CPR (Attempt to Resuscitate)  Medical Interventions (Patient has pulse or is breathing): Full Support      Expected Discharge  Expected Discharge Date: 2/10/2025; Expected Discharge Time: 12:00 PM      This note has been completed as part of a split-shared workflow.     Signature: Electronically signed by FABIO Raymundo, 02/06/25, 8:10 PM EST      Patient seen and examined. Questions answered.  Agree with above.    Elina May MD 02/06/25 23:08 EST

## 2025-02-07 NOTE — PLAN OF CARE
Goal Outcome Evaluation:      Problem: Adult Inpatient Plan of Care  Goal: Absence of Hospital-Acquired Illness or Injury  Intervention: Identify and Manage Fall Risk  Recent Flowsheet Documentation  Taken 2/7/2025 0408 by Jorden العراقي RN  Safety Promotion/Fall Prevention: safety round/check completed  Taken 2/7/2025 0200 by Jorden العراقي RN  Safety Promotion/Fall Prevention: safety round/check completed  Taken 2/7/2025 0033 by Jorden العراقي RN  Safety Promotion/Fall Prevention: safety round/check completed  Taken 2/6/2025 2200 by Jorden العراقي RN  Safety Promotion/Fall Prevention: safety round/check completed  Taken 2/6/2025 2038 by Jorden العراقي RN  Safety Promotion/Fall Prevention:   safety round/check completed   room organization consistent   clutter free environment maintained   fall prevention program maintained   nonskid shoes/slippers when out of bed     Problem: Adult Inpatient Plan of Care  Goal: Absence of Hospital-Acquired Illness or Injury  Intervention: Prevent Skin Injury  Recent Flowsheet Documentation  Taken 2/7/2025 0408 by Jorden العراقي RN  Body Position: position changed independently  Taken 2/7/2025 0200 by Jorden العراقي RN  Body Position: position changed independently  Taken 2/7/2025 0033 by Jorden العراقي RN  Body Position: position changed independently  Taken 2/6/2025 2200 by Jorden العراقي RN  Body Position: position changed independently  Taken 2/6/2025 2038 by Jorden العراقي RN  Body Position: position changed independently     Problem: Adult Inpatient Plan of Care  Goal: Optimal Comfort and Wellbeing  Intervention: Monitor Pain and Promote Comfort  Recent Flowsheet Documentation  Taken 2/6/2025 2200 by Jorden العراقي RN  Pain Management Interventions:   medication offered but refused   care clustered   diversional activity provided   pain management plan reviewed with  patient/caregiver   pillow support provided   position adjusted   quiet environment facilitated  Taken 2/6/2025 2038 by Jorden العراقي, RN  Pain Management Interventions:   pain management plan reviewed with patient/caregiver   medication offered but refused   care clustered   diversional activity provided   pillow support provided   position adjusted   quiet environment facilitated

## 2025-02-07 NOTE — ED PROVIDER NOTES
EMERGENCY DEPARTMENT ENCOUNTER    Pt Name: Arely Mesa  MRN: [id number]  Pt :   1955  Room Number:  S574/1  Date of encounter:  2025  PCP: Mohini Baeza MD  ED Provider: Poli Matute MD    Historian: Patient and later sister      HPI:  Chief Complaint: Abdominal pain        Context: Arely Mesa is a 69-year-old woman with history of  IBS who presents because of severe left abdominal pain.  She first noticed it just left of the umbilicus around midnight last night she had felt fine prior to that.  The pain has now extended to both the left upper and left lower quadrant as well as to the left flank.  She has had retching and dry heaves without vomiting or diarrhea she denies dysuria.  No other complaints at this time.      PAST MEDICAL HISTORY  Past Medical History:   Diagnosis Date    Asthma     Fibroids     Genital HSV     Hypercholesteremia     Off meds ~     Hypothyroid 2003    Meniere disease, right along with tinnitus     Tubular adenoma of colon 2015    f/u scope 2018 normal    Tubular adenoma of colon 2023         PAST SURGICAL HISTORY  Past Surgical History:   Procedure Laterality Date     SECTION  1985    ORIF PATELLA FRACTURE Left 2018    RHINOPLASTY  1990    TONSILLECTOMY  1964    TRIGGER FINGER RELEASE Left 2013    index finger         FAMILY HISTORY  Family History   Problem Relation Age of Onset    Uterine cancer Mother     Pancreatic cancer Paternal Grandmother          SOCIAL HISTORY  Social History     Socioeconomic History    Marital status:     Number of children: 2   Tobacco Use    Smoking status: Never   Vaping Use    Vaping status: Never Used   Substance and Sexual Activity    Alcohol use: Yes     Alcohol/week: 2.0 standard drinks of alcohol     Types: 2 Glasses of wine per week    Drug use: No    Sexual activity: Not Currently         ALLERGIES  Hydrocodone-acetaminophen        REVIEW OF  SYSTEMS  Review of Systems       All systems reviewed and negative except for those discussed in HPI.       PHYSICAL EXAM    I have reviewed the triage vital signs and nursing notes.    ED Triage Vitals [02/06/25 1411]   Temp Heart Rate Resp BP SpO2   97.8 °F (36.6 °C) 75 18 (!) 167/104 99 %      Temp src Heart Rate Source Patient Position BP Location FiO2 (%)   Oral Monitor Sitting Right arm --       Physical Exam  GENERAL:   Appears in no acute distress.   HENT: Nares patent.  EYES: No scleral icterus.  CV: Regular rhythm, regular rate.  RESPIRATORY: Normal effort.  No audible wheezes, rales or rhonchi.  ABDOMEN: Soft, but with tenderness over the left abdomen  MUSCULOSKELETAL: No deformities.   NEURO: Alert, moves all extremities, follows commands.  SKIN: Warm, dry, no rash visualized.      LAB RESULTS  Recent Results (from the past 24 hours)   Comprehensive Metabolic Panel    Collection Time: 02/06/25  2:33 PM    Specimen: Blood   Result Value Ref Range    Glucose 102 (H) 65 - 99 mg/dL    BUN 12 8 - 23 mg/dL    Creatinine 0.77 0.57 - 1.00 mg/dL    Sodium 132 (L) 136 - 145 mmol/L    Potassium 3.7 3.5 - 5.2 mmol/L    Chloride 94 (L) 98 - 107 mmol/L    CO2 27.0 22.0 - 29.0 mmol/L    Calcium 9.0 8.6 - 10.5 mg/dL    Total Protein 6.7 6.0 - 8.5 g/dL    Albumin 4.3 3.5 - 5.2 g/dL    ALT (SGPT) 17 1 - 33 U/L    AST (SGOT) 24 1 - 32 U/L    Alkaline Phosphatase 99 39 - 117 U/L    Total Bilirubin 0.3 0.0 - 1.2 mg/dL    Globulin 2.4 gm/dL    A/G Ratio 1.8 g/dL    BUN/Creatinine Ratio 15.6 7.0 - 25.0    Anion Gap 11.0 5.0 - 15.0 mmol/L    eGFR 83.6 >60.0 mL/min/1.73   Lipase    Collection Time: 02/06/25  2:33 PM    Specimen: Blood   Result Value Ref Range    Lipase 30 13 - 60 U/L   High Sensitivity Troponin T    Collection Time: 02/06/25  2:33 PM    Specimen: Blood   Result Value Ref Range    HS Troponin T 20 (H) <14 ng/L   Lactic Acid, Plasma    Collection Time: 02/06/25  2:33 PM    Specimen: Blood   Result Value Ref Range     Lactate 1.7 0.5 - 2.0 mmol/L   Procalcitonin    Collection Time: 02/06/25  2:33 PM    Specimen: Blood   Result Value Ref Range    Procalcitonin 0.03 0.00 - 0.25 ng/mL   C-reactive Protein    Collection Time: 02/06/25  2:33 PM    Specimen: Blood   Result Value Ref Range    C-Reactive Protein <0.30 0.00 - 0.50 mg/dL   Magnesium    Collection Time: 02/06/25  2:33 PM    Specimen: Blood   Result Value Ref Range    Magnesium 1.7 1.6 - 2.4 mg/dL   CBC Auto Differential    Collection Time: 02/06/25  2:33 PM    Specimen: Blood   Result Value Ref Range    WBC 18.66 (H) 3.40 - 10.80 10*3/mm3    RBC 4.61 3.77 - 5.28 10*6/mm3    Hemoglobin 13.8 12.0 - 15.9 g/dL    Hematocrit 40.9 34.0 - 46.6 %    MCV 88.7 79.0 - 97.0 fL    MCH 29.9 26.6 - 33.0 pg    MCHC 33.7 31.5 - 35.7 g/dL    RDW 12.4 12.3 - 15.4 %    RDW-SD 40.7 37.0 - 54.0 fl    MPV 9.5 6.0 - 12.0 fL    Platelets 304 140 - 450 10*3/mm3    Neutrophil % 84.6 (H) 42.7 - 76.0 %    Lymphocyte % 7.8 (L) 19.6 - 45.3 %    Monocyte % 6.5 5.0 - 12.0 %    Eosinophil % 0.2 (L) 0.3 - 6.2 %    Basophil % 0.4 0.0 - 1.5 %    Immature Grans % 0.5 0.0 - 0.5 %    Neutrophils, Absolute 15.78 (H) 1.70 - 7.00 10*3/mm3    Lymphocytes, Absolute 1.45 0.70 - 3.10 10*3/mm3    Monocytes, Absolute 1.22 (H) 0.10 - 0.90 10*3/mm3    Eosinophils, Absolute 0.03 0.00 - 0.40 10*3/mm3    Basophils, Absolute 0.08 0.00 - 0.20 10*3/mm3    Immature Grans, Absolute 0.10 (H) 0.00 - 0.05 10*3/mm3    nRBC 0.0 0.0 - 0.2 /100 WBC   COVID-19, FLU A/B, RSV PCR 1 HR TAT - Swab, Nasopharynx    Collection Time: 02/06/25  2:34 PM    Specimen: Nasopharynx; Swab   Result Value Ref Range    COVID19 Not Detected Not Detected - Ref. Range    Influenza A PCR Not Detected Not Detected    Influenza B PCR Not Detected Not Detected    RSV, PCR Not Detected Not Detected   ECG 12 Lead Chest Pain    Collection Time: 02/06/25  2:39 PM   Result Value Ref Range    QT Interval 392 ms    QTC Interval 441 ms   Urinalysis With Microscopic If  Indicated (No Culture) - Urine, Clean Catch    Collection Time: 02/06/25  2:44 PM    Specimen: Urine, Clean Catch   Result Value Ref Range    Color, UA Yellow Yellow, Straw    Appearance, UA Clear Clear    pH, UA 7.0 5.0 - 8.0    Specific Gravity, UA 1.019 1.001 - 1.030    Glucose, UA Negative Negative    Ketones, UA 15 mg/dL (1+) (A) Negative    Bilirubin, UA Negative Negative    Blood, UA Negative Negative    Protein, UA 30 mg/dL (1+) (A) Negative    Leuk Esterase, UA Trace (A) Negative    Nitrite, UA Negative Negative    Urobilinogen, UA 0.2 E.U./dL 0.2 - 1.0 E.U./dL   Urinalysis, Microscopic Only - Urine, Clean Catch    Collection Time: 02/06/25  2:44 PM    Specimen: Urine, Clean Catch   Result Value Ref Range    RBC, UA 0-2 None Seen, 0-2 /HPF    WBC, UA 3-5 (A) None Seen, 0-2 /HPF    Bacteria, UA None Seen None Seen, Trace /HPF    Squamous Epithelial Cells, UA 0-2 None Seen, 0-2 /HPF    Hyaline Casts, UA 0-6 0 - 6 /LPF    Methodology Automated Microscopy    High Sensitivity Troponin T 1Hr    Collection Time: 02/06/25  4:24 PM    Specimen: Blood   Result Value Ref Range    HS Troponin T 32 (H) <14 ng/L    Troponin T Numeric Delta 12 (C) Abnormal if >/=3 ng/L    Troponin T % Delta 60 (C) Abnormal if >/= 20%   D-dimer, Quantitative    Collection Time: 02/06/25  6:37 PM    Specimen: Blood   Result Value Ref Range    D-Dimer, Quantitative 0.60 0.00 - 0.69 MCGFEU/mL   Protime-INR    Collection Time: 02/06/25  6:37 PM    Specimen: Blood   Result Value Ref Range    Protime 12.8 12.2 - 14.5 Seconds    INR 0.95 0.89 - 1.12       If labs were ordered, I independently reviewed the results and considered them in treating the patient.        RADIOLOGY  CT Abdomen Pelvis With Contrast    Result Date: 2/6/2025  CT ABDOMEN PELVIS W CONTRAST Date of Exam: 2/6/2025 3:09 PM EST Indication: severe left abdominal/flank pain, wretching. Comparison: None available. Technique: Axial CT images were obtained of the abdomen and pelvis  following the uneventful intravenous administration of 85 mL Isovue-300. Reconstructed coronal and sagittal images were also obtained. Automated exposure control and iterative construction methods were used. Findings: Liver: The liver is unremarkable in morphology. There are numerous indeterminate liver masses measuring up to 1.6 cm. No biliary dilation is seen. Gallbladder: Unremarkable. Pancreas: Suggestion of a 2.6 cm mass within the body of the pancreas with associated coarse calcification (series 2, image 40). There is upstream pancreatic ductal dilation with atrophy of the pancreatic tail. Head and neck of the pancreas appear grossly unremarkable with decompressed pancreatic duct. Spleen: Numerous small, indeterminate splenic hypodensities Adrenal glands: 3.3 cm mass or enlarged lymph node adjacent to the left adrenal gland. Right adrenal gland is unremarkable. Genitourinary tract: The kidneys are unremarkable. No hydronephrosis is seen. The visualized portions of the ureters and urinary bladder appear unremarkable. Pelvic organs demonstrate no acute abnormality. Gastrointestinal tract: Colonic diverticulosis is present. Hollow viscera appear otherwise unremarkable. There is no evidence of bowel obstruction. Appendix: No findings to suggest acute appendicitis. Other findings: Multiple enlarged upper abdominal lymph nodes. Enlarged aortocaval lymph node measures 17 mm short axis (series 2, image 49). Additional prominent retroperitoneal lymph nodes are seen. Several prominent periportal and peripancreatic lymph  nodes. Bones and soft tissues: No acute or suspicious osseous or soft tissue lesion is identified. Lung bases: The visualized lung bases are clear.     Impression: 1.2.6 cm mass within the body of the pancreas with associated coarse calcification. There is upstream pancreatic ductal dilation with atrophy of the pancreatic tail. Appearance is concerning for primary pancreatic malignancy. 2.Numerous small  hypoenhancing masses within the liver and spleen, concerning for metastatic lesions. 3.Upper abdominal/retroperitoneal lymphadenopathy, including a 3.3 cm mass adjacent to the left adrenal gland. 4.Recommend pancreatic protocol MRI with and without IV contrast for further characterization of these findings. Electronically Signed: Eitan Martin MD  2/6/2025 3:33 PM EST  Workstation ID: [id number]     I ordered and independently reviewed the above noted radiographic studies.      I viewed images of CT scan abdomen pelvis which showed calcified mass in the pancreas and innumerable hypodense lesions in the spleen concerning for possible metastasis per my independent interpretation.    See radiologist's dictation for official interpretation.        PROCEDURES    Procedures    ECG 12 Lead Chest Pain   Preliminary Result   Test Reason : Chest Pain   Blood Pressure :   */*   mmHG   Vent. Rate :  76 BPM     Atrial Rate :  76 BPM      P-R Int : 118 ms          QRS Dur :  82 ms       QT Int : 392 ms       P-R-T Axes :  20   8  36 degrees     QTcB Int : 441 ms      ** Poor data quality, interpretation may be adversely affected   Normal sinus rhythm   Septal infarct (cited on or before 09-Jul-2023)   Abnormal ECG   When compared with ECG of 09-Jul-2023 21:06,   No significant change was found      Referred By:            Confirmed By:           MEDICATIONS GIVEN IN ER    Medications   HYDROmorphone (DILAUDID) injection 0.5 mg (has no administration in time range)   ondansetron (ZOFRAN) injection 4 mg (has no administration in time range)   levothyroxine (SYNTHROID, LEVOTHROID) tablet 75 mcg (has no administration in time range)   montelukast (SINGULAIR) tablet 10 mg (has no administration in time range)   oxybutynin XL (DITROPAN-XL) 24 hr tablet 5 mg (has no administration in time range)   sodium chloride 0.9 % flush 10 mL (has no administration in time range)   sodium chloride 0.9 % flush 10 mL (has no administration in time  range)   sennosides-docusate (PERICOLACE) 8.6-50 MG per tablet 2 tablet (has no administration in time range)     And   polyethylene glycol (MIRALAX) packet 17 g (has no administration in time range)     And   bisacodyl (DULCOLAX) EC tablet 5 mg (has no administration in time range)     And   bisacodyl (DULCOLAX) suppository 10 mg (has no administration in time range)   Potassium Replacement - Follow Nurse / BPA Driven Protocol (has no administration in time range)   Magnesium Standard Dose Replacement - Follow Nurse / BPA Driven Protocol (has no administration in time range)   Phosphorus Replacement - Follow Nurse / BPA Driven Protocol (has no administration in time range)   Calcium Replacement - Follow Nurse / BPA Driven Protocol (has no administration in time range)   Enoxaparin Sodium (LOVENOX) syringe 40 mg (has no administration in time range)   acetaminophen (TYLENOL) tablet 650 mg (has no administration in time range)     Or   acetaminophen (TYLENOL) 160 MG/5ML oral solution 650 mg (has no administration in time range)     Or   acetaminophen (TYLENOL) suppository 650 mg (has no administration in time range)   cyclobenzaprine (FLEXERIL) tablet 5 mg (has no administration in time range)   ipratropium-albuterol (DUO-NEB) nebulizer solution 3 mL (has no administration in time range)   ondansetron ODT (ZOFRAN-ODT) disintegrating tablet 4 mg (4 mg Oral Given 2/6/25 1443)   lactated ringers bolus 1,000 mL (0 mL Intravenous Stopped 2/6/25 1716)   ketorolac (TORADOL) injection 15 mg (15 mg Intravenous Given 2/6/25 1628)   oxyCODONE (ROXICODONE) immediate release tablet 5 mg (5 mg Oral Given 2/6/25 1443)   iopamidol (ISOVUE-300) 61 % injection 85 mL (85 mL Intravenous Given 2/6/25 1511)   iopamidol (ISOVUE-370) 76 % injection 75 mL (75 mL Intravenous Given 2/6/25 2116)         MEDICAL DECISION MAKING, PROGRESS, and CONSULTS    All labs, if obtained, have been independently reviewed by me.  All radiology studies, if  obtained, have been reviewed by me and the radiologist dictating the report.  All EKGs, if obtained, have been independently viewed and interpreted by me/my attending physician.      Discussion below represents my analysis of pertinent findings related to patient's condition, differential diagnosis, treatment plan and final disposition.                                          Differential diagnosis:    Sepsis, volvulus, ileus, bowel obstruction, colitis, diverticulitis, pancreatitis, anemia, electrolyte      Additional sources:    - Discussed/ obtained information from independent historians: Sister    - External (non-ED) record review: Patient PCP notes showing history of hypothyroidism and hyperlipidemia    - Chronic or social conditions impacting care:          Orders placed during this visit:  Orders Placed This Encounter   Procedures    COVID PRE-OP / PRE-PROCEDURE SCREENING ORDER (NO ISOLATION) - Swab, Nasopharynx    COVID-19, FLU A/B, RSV PCR 1 HR TAT - Swab, Nasopharynx    CT Abdomen Pelvis With Contrast    CT Angiogram Chest    CT Needle Biopsy Abdomen    Comprehensive Metabolic Panel    Lipase    Urinalysis With Microscopic If Indicated (No Culture) - Urine, Clean Catch    High Sensitivity Troponin T    Lactic Acid, Plasma    Procalcitonin    C-reactive Protein    Magnesium    CBC Auto Differential    Urinalysis, Microscopic Only - Urine, Clean Catch    High Sensitivity Troponin T 1Hr    D-dimer, Quantitative    Protime-INR    Cancer Antigen 19-9    CBC Auto Differential    Comprehensive Metabolic Panel    NPO Diet NPO Type: Strict NPO    Diet: Cardiac; Healthy Heart (2-3 Na+); Fluid Consistency: Thin (IDDSI 0)    Vital Signs    Activity - [place]    Intake & Output    Weigh Patient    Oral Care    Maintain IV Access    Telemetry - Place Orders & Notify Provider of Results When Patient Experiences Acute Chest Pain, Dysrhythmia or Respiratory Distress    May Be Off Telemetry for Tests    Continuous Pulse  Oximetry    Code Status and Medical Interventions: CPR (Attempt to Resuscitate); Full Support    Inpatient Hematology & Oncology Consult    Oxygen Therapy- Nasal Cannula; Titrate 1-6 LPM Per SpO2; 90% or Greater    ECG 12 Lead Chest Pain    Insert Peripheral IV    Inpatient Admission    CBC & Differential         Additional orders considered but not ordered:      ED Course:    Consultants: [facility] medicine    ED Course as of 25 2135   Thu  This a very nice 69-year-old woman with history of  IBS who presents because of severe left abdominal pain.  She first noticed it just left of the umbilicus around midnight last night she had felt fine prior to that.  The pain has now extended to both the left upper and left lower quadrant as well as to the left flank.  She has had retching and dry heaves without vomiting or diarrhea she denies dysuria.  No other complaints at this time. [CC]   7 She arrived awake and alert but with ongoing discomfort significant pain in the left abdomen.  Tenderness to palpation also left CVA tenderness but she is tender over the paraspinal muscles this may be a strain treating with fluids Toradol Zofran and oxycodone obtaining full abdominal laboratory workup and CT scan of the abdomen pelvis will reevaluate pending initial workup [CC]    CBC showing significant leukocytosis with neutrophilic predominance no elevation in lactate or lipase or procalcitonin  Metabolic panel showing mild hyponatremia and hypochloremia but generally reassuring and nonactionable mildly elevated troponin she denies chest pain urinalysis showing 3-5 whites D-dimer is negative.  CT scan of the abdomen pelvis unfortunately showing what appears to be a pancreatic mass as well as hypodensities in the liver and spleen concerning for possible metastatic disease radiology is recommending MRI pancreas protocol.  I have discussed this with the patient she is agreeable to hospital  admission for further workup she does have a family history of pancreatic cancer.  Medicine team consulted for admission. [CC]      ED Course User Index  [CC] Poli Matute MD              Shared Decision Making:  After my consideration of clinical presentation and any laboratory/radiology studies obtained, I discussed the findings with the patient/patient representative who is in agreement with the treatment plan and the final disposition.   Risks and benefits of discharge and/or observation/admission were discussed.       AS OF 21:35 EST VITALS:    BP - 149/86  HR - 88  TEMP - 98.7 °F (37.1 °C) (Oral)  O2 SATS - 96%                  DIAGNOSIS  Final diagnoses:   Pancreatic mass   Lymphadenopathy   Generalized abdominal pain   Leukocytosis, unspecified type         DISPOSITION  Admit      Please note that portions of this document were completed with voice recognition software.        Poli Matute MD  02/06/25 9477     97.6

## 2025-02-07 NOTE — PAYOR COMM NOTE
"Ref# [id number]     LUCIO Membreno, RN  Utilization Review  Phone [phone]  Fax [phone]    [place] [address]           Arely Mesa \"Izzy\" (69 y.o. Female)       Date of Birth   1955    Social Security Number       Address   [address] [place] [address]    Home Phone   [phone]    MRN   [id number]       [place]   [employer]    Marital Status                               Admission Date   2/6/25    Admission Type   Emergency    Admitting Provider   Dalila Mendoza MD    Attending Provider   Dalila Mendoza MD    Department, Room/Bed   [facility] 5H, S574/1       Discharge Date       Discharge Disposition       Discharge Destination                                 Attending Provider: Dalila Mendoza MD    Allergies: Hydrocodone-acetaminophen    Isolation: None   Infection: None   Code Status: CPR    Ht: 165.1 cm (65\")   Wt: 59.9 kg (132 lb)    Admission Cmt: None   Principal Problem: Pancreatic mass [K86.89]                   Active Insurance as of 2/6/2025       Primary Coverage       Payor Plan Insurance Group Employer/Plan Group    ANTHEM BLUE CROSS ANTHEM BLUE CROSS BLUE SHIELD PPO [id number]       Payor Plan Address Payor Plan Phone Number Payor Plan Fax Number Effective Dates    [address] [phone]  7/1/2021 - None Entered    [place] [address]         Subscriber Name Subscriber Birth Date Member ID       ARELY MESA 1955 [id number]                     Emergency Contacts        (Rel.) Home Phone Work Phone Mobile Phone    Marcelo Callahan (Son) [phone] -- --    Ann Arita (Sister) -- -- [phone]              [place]: NPI [id number] Tax ID [id number]  Insurance Information                  ANTHEM BLUE CROSS/ANTHEM BLUE CROSS BLUE SHIELD PPO Phone: [phone]    Subscriber: Arely Mesa Subscriber#: [id number]    Group#: [id number] Precert#: -- "    Authorization#: -- Effective Date: --             History & Physical        Elina May MD at 25 1905              [facility] Medicine Services  HISTORY AND PHYSICAL    Patient Name: Arely Mesa  : 1955  MRN: [id number]  Primary Care Physician: Mohini Baeza MD  Date of admission: 2025    Subjective  Subjective     Chief Complaint:  Abdominal pain    HPI:  Arely Mesa is a 69 y.o. female with significant medical history for Ménière's disease, asthma, HLD, and hypothyroidism  who presents to [facility] with complaints of abdominal pain.     Patient reports abdominal pain began on .  She woke up and at approximately midnight with sharp mid epigastric pain that she rates a 5 out of 10. Pain radiates to her left lower abdomen.  She has also experienced nausea, vomiting, and chills.  Additionally she noted some lower back pain, which she attributes to pulling a muscle from hyperemesis.  Patient denies any appetite changes or unexpected weight loss greater than 5 pounds.  She is accompanied by her sister at bedside.      Patient denies headache, fevers, but is positive for chills.  She denies chest pain or shortness of breath.  She denies diarrhea, but is positive for nausea and vomiting.    In ED    Troponin 20, 32    UA negative for UTI.        Review of Systems   Constitutional:  Positive for chills. Negative for activity change, fatigue, fever and unexpected weight change.   HENT: Negative.     Eyes: Negative.    Respiratory:  Negative for cough, chest tightness and shortness of breath.    Cardiovascular:  Negative for chest pain and leg swelling.   Gastrointestinal:  Positive for abdominal pain, nausea and vomiting. Negative for abdominal distention and diarrhea.   Endocrine: Negative.    Genitourinary: Negative.    Musculoskeletal:  Positive for back pain. Negative for arthralgias.   Skin: Negative.    Allergic/Immunologic:  Negative.    Neurological:  Negative for dizziness, syncope, light-headedness and headaches.   Hematological: Negative.    Psychiatric/Behavioral: Negative.              Personal History     Past Medical History:   Diagnosis Date    Asthma     Fibroids     Genital HSV     Hypercholesteremia     Off meds ~     Hypothyroid 2003    Meniere disease, right along with tinnitus     Tubular adenoma of colon 2015    f/u scope 2018 normal    Tubular adenoma of colon 2023             Past Surgical History:   Procedure Laterality Date     SECTION  1985    ORIF PATELLA FRACTURE Left 2018    RHINOPLASTY  1990    TONSILLECTOMY  1964    TRIGGER FINGER RELEASE Left 2013    index finger       Family History: family history includes Uterine cancer in her mother.     Social History:  reports that she has never smoked. She does not have any smokeless tobacco history on file. She reports current alcohol use of about 2.0 standard drinks of alcohol per week. She reports that she does not use drugs.  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   HENT:      Head: Normocephalic and atraumatic.   Eyes:      Extraocular Movements: Extraocular movements intact.      Pupils: Pupils are equal, round, and reactive to light.   Cardiovascular:      Rate and Rhythm: Normal rate and regular rhythm.      Pulses: Normal pulses.      Heart sounds: Normal heart sounds.   Pulmonary:      Effort: Pulmonary effort is normal.      Breath sounds: Normal  breath sounds.   Abdominal:      General: Bowel sounds are normal. There is no distension.      Palpations: Abdomen is soft.      Tenderness: There is abdominal tenderness.   Musculoskeletal:      Right lower leg: No edema.      Left lower leg: No edema.   Neurological:      General: No focal deficit present.      Mental Status: She is alert and oriented to person, place, and time.   Psychiatric:         Mood and Affect: Mood normal.         Behavior: Behavior normal.        Result Review:  I have personally reviewed the results from the time of this admission to 2/6/2025 20:18 EST and agree with these findings:  [x]  Laboratory list / accordion  []  Microbiology  [x]  Radiology  [x]  EKG/Telemetry   []  Cardiology/Vascular   []  Pathology  []  Old records  []  Other:  Most notable findings include: Elevated troponin, no EKG changes, hyponatremia (unsure of clinical significance), leukocytosis, negative UA, CT of abdomen/chest reveals 2.6 cm mass, suspicious for primary pancreatic malignancy.  Numerous masses within the liver and spleen concerning for metastatic lesions.  Lymph and adenopathy is also present, including 3.3 cm mass adjacent to the left adrenal gland.    LAB RESULTS:      Lab 02/06/25 1837 02/06/25  1433   WBC  --  18.66*   HEMOGLOBIN  --  13.8   HEMATOCRIT  --  40.9   PLATELETS  --  304   NEUTROS ABS  --  15.78*   IMMATURE GRANS (ABS)  --  0.10*   LYMPHS ABS  --  1.45   MONOS ABS  --  1.22*   EOS ABS  --  0.03   MCV  --  88.7   CRP  --  <0.30   PROCALCITONIN  --  0.03   LACTATE  --  1.7   PROTIME 12.8  --    D DIMER QUANT 0.60  --          Lab 02/06/25  1433   SODIUM 132*   POTASSIUM 3.7   CHLORIDE 94*   CO2 27.0   ANION GAP 11.0   BUN 12   CREATININE 0.77   EGFR 83.6   GLUCOSE 102*   CALCIUM 9.0   MAGNESIUM 1.7         Lab 02/06/25  1433   TOTAL PROTEIN 6.7   ALBUMIN 4.3   GLOBULIN 2.4   ALT (SGPT) 17   AST (SGOT) 24   BILIRUBIN 0.3   ALK PHOS 99   LIPASE 30         Lab 02/06/25 1837  02/06/25  1624 02/06/25  1433   HSTROP T  --  32* 20*   PROTIME 12.8  --   --    INR 0.95  --   --                  Brief Urine Lab Results  (Last result in the past 365 days)        Color   Clarity   Blood   Leuk Est   Nitrite   Protein   CREAT   Urine HCG        02/06/25 1444 Yellow   Clear   Negative   Trace   Negative   30 mg/dL (1+)                 Microbiology Results (last 10 days)       Procedure Component Value - Date/Time    COVID PRE-OP / PRE-PROCEDURE SCREENING ORDER (NO ISOLATION) - Swab, Nasopharynx [[id number]]  (Normal) Collected: 02/06/25 1434    Lab Status: Final result Specimen: Swab from Nasopharynx Updated: 02/06/25 1600    Narrative:      The following orders were created for panel order COVID PRE-OP / PRE-PROCEDURE SCREENING ORDER (NO ISOLATION) - Swab, Nasopharynx.  Procedure                               Abnormality         Status                     ---------                               -----------         ------                     COVID-19, FLU A/B, RSV P...[[id number]]  Normal              Final result                 Please view results for these tests on the individual orders.    COVID-19, FLU A/B, RSV PCR 1 HR TAT - Swab, Nasopharynx [[id number]]  (Normal) Collected: 02/06/25 1434    Lab Status: Final result Specimen: Swab from Nasopharynx Updated: 02/06/25 1600     COVID19 Not Detected     Influenza A PCR Not Detected     Influenza B PCR Not Detected     RSV, PCR Not Detected    Narrative:      Fact sheet for providers: https://www.fda.gov/media/260289/download    Fact sheet for patients: https://www.fda.gov/media/710374/download    Test performed by PCR.            CT Abdomen Pelvis With Contrast    Result Date: 2/6/2025  CT ABDOMEN PELVIS W CONTRAST Date of Exam: 2/6/2025 3:09 PM EST Indication: severe left abdominal/flank pain, wretching. Comparison: None available. Technique: Axial CT images were obtained of the abdomen and pelvis following the uneventful intravenous  administration of 85 mL Isovue-300. Reconstructed coronal and sagittal images were also obtained. Automated exposure control and iterative construction methods were used. Findings: Liver: The liver is unremarkable in morphology. There are numerous indeterminate liver masses measuring up to 1.6 cm. No biliary dilation is seen. Gallbladder: Unremarkable. Pancreas: Suggestion of a 2.6 cm mass within the body of the pancreas with associated coarse calcification (series 2, image 40). There is upstream pancreatic ductal dilation with atrophy of the pancreatic tail. Head and neck of the pancreas appear grossly unremarkable with decompressed pancreatic duct. Spleen: Numerous small, indeterminate splenic hypodensities Adrenal glands: 3.3 cm mass or enlarged lymph node adjacent to the left adrenal gland. Right adrenal gland is unremarkable. Genitourinary tract: The kidneys are unremarkable. No hydronephrosis is seen. The visualized portions of the ureters and urinary bladder appear unremarkable. Pelvic organs demonstrate no acute abnormality. Gastrointestinal tract: Colonic diverticulosis is present. Hollow viscera appear otherwise unremarkable. There is no evidence of bowel obstruction. Appendix: No findings to suggest acute appendicitis. Other findings: Multiple enlarged upper abdominal lymph nodes. Enlarged aortocaval lymph node measures 17 mm short axis (series 2, image 49). Additional prominent retroperitoneal lymph nodes are seen. Several prominent periportal and peripancreatic lymph  nodes. Bones and soft tissues: No acute or suspicious osseous or soft tissue lesion is identified. Lung bases: The visualized lung bases are clear.     Impression: Impression: 1.2.6 cm mass within the body of the pancreas with associated coarse calcification. There is upstream pancreatic ductal dilation with atrophy of the pancreatic tail. Appearance is concerning for primary pancreatic malignancy. 2.Numerous small hypoenhancing masses  within the liver and spleen, concerning for metastatic lesions. 3.Upper abdominal/retroperitoneal lymphadenopathy, including a 3.3 cm mass adjacent to the left adrenal gland. 4.Recommend pancreatic protocol MRI with and without IV contrast for further characterization of these findings. Electronically Signed: Eitan Martin MD  2/6/2025 3:33 PM EST  Workstation ID: [id number]         Assessment & Plan  Assessment & Plan       Pancreatic mass    Hypothyroidism (acquired)    Overactive bladder    Arely Mesa is a 69 y.o. female with significant medical history for Ménière's disease, asthma, HLD, and hypothyroidism, who presents to [facility] with complaints of abdominal pain.       Pancreatic mass, suspicious for malignancy  -CT reveals 2.6 cm pancreatic mass, concerning for primary pancreatic malignancy.  Numerous masses on liver and spleen.  0.3 cm mass left adrenal gland.  -CT of chest with multiple nodules, NO PE  -IR biopsy   -Heme-onc consult  -CBC CMP in a.m.    Asthma  -DuoNebs as needed    Hypothyroidism  -Continue levothyroxine    Overactive bladder  -Continue oxybutynin    DVT prophylaxis:  Lovenox     CODE STATUS:  full code   Level Of Support Discussed With: Patient  Code Status (Patient has no pulse and is not breathing): CPR (Attempt to Resuscitate)  Medical Interventions (Patient has pulse or is breathing): Full Support      Expected Discharge  Expected Discharge Date: 2/10/2025; Expected Discharge Time: 12:00 PM      This note has been completed as part of a split-shared workflow.     Signature: Electronically signed by FABIO Raymundo, 02/06/25, 8:10 PM EST      Patient seen and examined. Questions answered.  Agree with above.    Elina May MD 02/06/25 23:08 EST           Electronically signed by Elina May MD at 02/06/25 2308          Emergency Department Notes        Poli Matute MD at 02/06/25 2128           EMERGENCY DEPARTMENT ENCOUNTER    Pt Name:  Arely Mesa  MRN: [id number]  Pt :   1955  Room Number:  S574/1  Date of encounter:  2025  PCP: Mohini Baeza MD  ED Provider: Poli Matute MD    Historian: Patient and later sister      HPI:  Chief Complaint: Abdominal pain        Context: Arely Mesa is a 69-year-old woman with history of  IBS who presents because of severe left abdominal pain.  She first noticed it just left of the umbilicus around midnight last night she had felt fine prior to that.  The pain has now extended to both the left upper and left lower quadrant as well as to the left flank.  She has had retching and dry heaves without vomiting or diarrhea she denies dysuria.  No other complaints at this time.      PAST MEDICAL HISTORY  Past Medical History:   Diagnosis Date    Asthma     Fibroids     Genital HSV     Hypercholesteremia     Off meds ~     Hypothyroid 2003    Meniere disease, right along with tinnitus     Tubular adenoma of colon 2015    f/u scope 2018 normal    Tubular adenoma of colon 2023         PAST SURGICAL HISTORY  Past Surgical History:   Procedure Laterality Date     SECTION  1985    ORIF PATELLA FRACTURE Left 2018    RHINOPLASTY  1990    TONSILLECTOMY  1964    TRIGGER FINGER RELEASE Left 2013    index finger         FAMILY HISTORY  Family History   Problem Relation Age of Onset    Uterine cancer Mother     Pancreatic cancer Paternal Grandmother          SOCIAL HISTORY  Social History     Socioeconomic History    Marital status:     Number of children: 2   Tobacco Use    Smoking status: Never   Vaping Use    Vaping status: Never Used   Substance and Sexual Activity    Alcohol use: Yes     Alcohol/week: 2.0 standard drinks of alcohol     Types: 2 Glasses of wine per week    Drug use: No    Sexual activity: Not Currently         ALLERGIES  Hydrocodone-acetaminophen        REVIEW OF SYSTEMS  Review of Systems       All systems  reviewed and negative except for those discussed in HPI.       PHYSICAL EXAM    I have reviewed the triage vital signs and nursing notes.    ED Triage Vitals [02/06/25 1411]   Temp Heart Rate Resp BP SpO2   97.8 °F (36.6 °C) 75 18 (!) 167/104 99 %      Temp src Heart Rate Source Patient Position BP Location FiO2 (%)   Oral Monitor Sitting Right arm --       Physical Exam  GENERAL:   Appears in no acute distress.   HENT: Nares patent.  EYES: No scleral icterus.  CV: Regular rhythm, regular rate.  RESPIRATORY: Normal effort.  No audible wheezes, rales or rhonchi.  ABDOMEN: Soft, but with tenderness over the left abdomen  MUSCULOSKELETAL: No deformities.   NEURO: Alert, moves all extremities, follows commands.  SKIN: Warm, dry, no rash visualized.      LAB RESULTS  Recent Results (from the past 24 hours)   Comprehensive Metabolic Panel    Collection Time: 02/06/25  2:33 PM    Specimen: Blood   Result Value Ref Range    Glucose 102 (H) 65 - 99 mg/dL    BUN 12 8 - 23 mg/dL    Creatinine 0.77 0.57 - 1.00 mg/dL    Sodium 132 (L) 136 - 145 mmol/L    Potassium 3.7 3.5 - 5.2 mmol/L    Chloride 94 (L) 98 - 107 mmol/L    CO2 27.0 22.0 - 29.0 mmol/L    Calcium 9.0 8.6 - 10.5 mg/dL    Total Protein 6.7 6.0 - 8.5 g/dL    Albumin 4.3 3.5 - 5.2 g/dL    ALT (SGPT) 17 1 - 33 U/L    AST (SGOT) 24 1 - 32 U/L    Alkaline Phosphatase 99 39 - 117 U/L    Total Bilirubin 0.3 0.0 - 1.2 mg/dL    Globulin 2.4 gm/dL    A/G Ratio 1.8 g/dL    BUN/Creatinine Ratio 15.6 7.0 - 25.0    Anion Gap 11.0 5.0 - 15.0 mmol/L    eGFR 83.6 >60.0 mL/min/1.73   Lipase    Collection Time: 02/06/25  2:33 PM    Specimen: Blood   Result Value Ref Range    Lipase 30 13 - 60 U/L   High Sensitivity Troponin T    Collection Time: 02/06/25  2:33 PM    Specimen: Blood   Result Value Ref Range    HS Troponin T 20 (H) <14 ng/L   Lactic Acid, Plasma    Collection Time: 02/06/25  2:33 PM    Specimen: Blood   Result Value Ref Range    Lactate 1.7 0.5 - 2.0 mmol/L    Procalcitonin    Collection Time: 02/06/25  2:33 PM    Specimen: Blood   Result Value Ref Range    Procalcitonin 0.03 0.00 - 0.25 ng/mL   C-reactive Protein    Collection Time: 02/06/25  2:33 PM    Specimen: Blood   Result Value Ref Range    C-Reactive Protein <0.30 0.00 - 0.50 mg/dL   Magnesium    Collection Time: 02/06/25  2:33 PM    Specimen: Blood   Result Value Ref Range    Magnesium 1.7 1.6 - 2.4 mg/dL   CBC Auto Differential    Collection Time: 02/06/25  2:33 PM    Specimen: Blood   Result Value Ref Range    WBC 18.66 (H) 3.40 - 10.80 10*3/mm3    RBC 4.61 3.77 - 5.28 10*6/mm3    Hemoglobin 13.8 12.0 - 15.9 g/dL    Hematocrit 40.9 34.0 - 46.6 %    MCV 88.7 79.0 - 97.0 fL    MCH 29.9 26.6 - 33.0 pg    MCHC 33.7 31.5 - 35.7 g/dL    RDW 12.4 12.3 - 15.4 %    RDW-SD 40.7 37.0 - 54.0 fl    MPV 9.5 6.0 - 12.0 fL    Platelets 304 140 - 450 10*3/mm3    Neutrophil % 84.6 (H) 42.7 - 76.0 %    Lymphocyte % 7.8 (L) 19.6 - 45.3 %    Monocyte % 6.5 5.0 - 12.0 %    Eosinophil % 0.2 (L) 0.3 - 6.2 %    Basophil % 0.4 0.0 - 1.5 %    Immature Grans % 0.5 0.0 - 0.5 %    Neutrophils, Absolute 15.78 (H) 1.70 - 7.00 10*3/mm3    Lymphocytes, Absolute 1.45 0.70 - 3.10 10*3/mm3    Monocytes, Absolute 1.22 (H) 0.10 - 0.90 10*3/mm3    Eosinophils, Absolute 0.03 0.00 - 0.40 10*3/mm3    Basophils, Absolute 0.08 0.00 - 0.20 10*3/mm3    Immature Grans, Absolute 0.10 (H) 0.00 - 0.05 10*3/mm3    nRBC 0.0 0.0 - 0.2 /100 WBC   COVID-19, FLU A/B, RSV PCR 1 HR TAT - Swab, Nasopharynx    Collection Time: 02/06/25  2:34 PM    Specimen: Nasopharynx; Swab   Result Value Ref Range    COVID19 Not Detected Not Detected - Ref. Range    Influenza A PCR Not Detected Not Detected    Influenza B PCR Not Detected Not Detected    RSV, PCR Not Detected Not Detected   ECG 12 Lead Chest Pain    Collection Time: 02/06/25  2:39 PM   Result Value Ref Range    QT Interval 392 ms    QTC Interval 441 ms   Urinalysis With Microscopic If Indicated (No Culture) - Urine,  Clean Catch    Collection Time: 02/06/25  2:44 PM    Specimen: Urine, Clean Catch   Result Value Ref Range    Color, UA Yellow Yellow, Straw    Appearance, UA Clear Clear    pH, UA 7.0 5.0 - 8.0    Specific Gravity, UA 1.019 1.001 - 1.030    Glucose, UA Negative Negative    Ketones, UA 15 mg/dL (1+) (A) Negative    Bilirubin, UA Negative Negative    Blood, UA Negative Negative    Protein, UA 30 mg/dL (1+) (A) Negative    Leuk Esterase, UA Trace (A) Negative    Nitrite, UA Negative Negative    Urobilinogen, UA 0.2 E.U./dL 0.2 - 1.0 E.U./dL   Urinalysis, Microscopic Only - Urine, Clean Catch    Collection Time: 02/06/25  2:44 PM    Specimen: Urine, Clean Catch   Result Value Ref Range    RBC, UA 0-2 None Seen, 0-2 /HPF    WBC, UA 3-5 (A) None Seen, 0-2 /HPF    Bacteria, UA None Seen None Seen, Trace /HPF    Squamous Epithelial Cells, UA 0-2 None Seen, 0-2 /HPF    Hyaline Casts, UA 0-6 0 - 6 /LPF    Methodology Automated Microscopy    High Sensitivity Troponin T 1Hr    Collection Time: 02/06/25  4:24 PM    Specimen: Blood   Result Value Ref Range    HS Troponin T 32 (H) <14 ng/L    Troponin T Numeric Delta 12 (C) Abnormal if >/=3 ng/L    Troponin T % Delta 60 (C) Abnormal if >/= 20%   D-dimer, Quantitative    Collection Time: 02/06/25  6:37 PM    Specimen: Blood   Result Value Ref Range    D-Dimer, Quantitative 0.60 0.00 - 0.69 MCGFEU/mL   Protime-INR    Collection Time: 02/06/25  6:37 PM    Specimen: Blood   Result Value Ref Range    Protime 12.8 12.2 - 14.5 Seconds    INR 0.95 0.89 - 1.12       If labs were ordered, I independently reviewed the results and considered them in treating the patient.        RADIOLOGY  CT Abdomen Pelvis With Contrast    Result Date: 2/6/2025  CT ABDOMEN PELVIS W CONTRAST Date of Exam: 2/6/2025 3:09 PM EST Indication: severe left abdominal/flank pain, wretching. Comparison: None available. Technique: Axial CT images were obtained of the abdomen and pelvis following the uneventful  intravenous administration of 85 mL Isovue-300. Reconstructed coronal and sagittal images were also obtained. Automated exposure control and iterative construction methods were used. Findings: Liver: The liver is unremarkable in morphology. There are numerous indeterminate liver masses measuring up to 1.6 cm. No biliary dilation is seen. Gallbladder: Unremarkable. Pancreas: Suggestion of a 2.6 cm mass within the body of the pancreas with associated coarse calcification (series 2, image 40). There is upstream pancreatic ductal dilation with atrophy of the pancreatic tail. Head and neck of the pancreas appear grossly unremarkable with decompressed pancreatic duct. Spleen: Numerous small, indeterminate splenic hypodensities Adrenal glands: 3.3 cm mass or enlarged lymph node adjacent to the left adrenal gland. Right adrenal gland is unremarkable. Genitourinary tract: The kidneys are unremarkable. No hydronephrosis is seen. The visualized portions of the ureters and urinary bladder appear unremarkable. Pelvic organs demonstrate no acute abnormality. Gastrointestinal tract: Colonic diverticulosis is present. Hollow viscera appear otherwise unremarkable. There is no evidence of bowel obstruction. Appendix: No findings to suggest acute appendicitis. Other findings: Multiple enlarged upper abdominal lymph nodes. Enlarged aortocaval lymph node measures 17 mm short axis (series 2, image 49). Additional prominent retroperitoneal lymph nodes are seen. Several prominent periportal and peripancreatic lymph  nodes. Bones and soft tissues: No acute or suspicious osseous or soft tissue lesion is identified. Lung bases: The visualized lung bases are clear.     Impression: 1.2.6 cm mass within the body of the pancreas with associated coarse calcification. There is upstream pancreatic ductal dilation with atrophy of the pancreatic tail. Appearance is concerning for primary pancreatic malignancy. 2.Numerous small hypoenhancing masses  within the liver and spleen, concerning for metastatic lesions. 3.Upper abdominal/retroperitoneal lymphadenopathy, including a 3.3 cm mass adjacent to the left adrenal gland. 4.Recommend pancreatic protocol MRI with and without IV contrast for further characterization of these findings. Electronically Signed: Eitan Martin MD  2/6/2025 3:33 PM EST  Workstation ID: [id number]     I ordered and independently reviewed the above noted radiographic studies.      I viewed images of CT scan abdomen pelvis which showed calcified mass in the pancreas and innumerable hypodense lesions in the spleen concerning for possible metastasis per my independent interpretation.    See radiologist's dictation for official interpretation.        PROCEDURES    Procedures    ECG 12 Lead Chest Pain   Preliminary Result   Test Reason : Chest Pain   Blood Pressure :   */*   mmHG   Vent. Rate :  76 BPM     Atrial Rate :  76 BPM      P-R Int : 118 ms          QRS Dur :  82 ms       QT Int : 392 ms       P-R-T Axes :  20   8  36 degrees     QTcB Int : 441 ms      ** Poor data quality, interpretation may be adversely affected   Normal sinus rhythm   Septal infarct (cited on or before 09-Jul-2023)   Abnormal ECG   When compared with ECG of 09-Jul-2023 21:06,   No significant change was found      Referred By:            Confirmed By:           MEDICATIONS GIVEN IN ER    Medications   HYDROmorphone (DILAUDID) injection 0.5 mg (has no administration in time range)   ondansetron (ZOFRAN) injection 4 mg (has no administration in time range)   levothyroxine (SYNTHROID, LEVOTHROID) tablet 75 mcg (has no administration in time range)   montelukast (SINGULAIR) tablet 10 mg (has no administration in time range)   oxybutynin XL (DITROPAN-XL) 24 hr tablet 5 mg (has no administration in time range)   sodium chloride 0.9 % flush 10 mL (has no administration in time range)   sodium chloride 0.9 % flush 10 mL (has no administration in time range)    sennosides-docusate (PERICOLACE) 8.6-50 MG per tablet 2 tablet (has no administration in time range)     And   polyethylene glycol (MIRALAX) packet 17 g (has no administration in time range)     And   bisacodyl (DULCOLAX) EC tablet 5 mg (has no administration in time range)     And   bisacodyl (DULCOLAX) suppository 10 mg (has no administration in time range)   Potassium Replacement - Follow Nurse / BPA Driven Protocol (has no administration in time range)   Magnesium Standard Dose Replacement - Follow Nurse / BPA Driven Protocol (has no administration in time range)   Phosphorus Replacement - Follow Nurse / BPA Driven Protocol (has no administration in time range)   Calcium Replacement - Follow Nurse / BPA Driven Protocol (has no administration in time range)   Enoxaparin Sodium (LOVENOX) syringe 40 mg (has no administration in time range)   acetaminophen (TYLENOL) tablet 650 mg (has no administration in time range)     Or   acetaminophen (TYLENOL) 160 MG/5ML oral solution 650 mg (has no administration in time range)     Or   acetaminophen (TYLENOL) suppository 650 mg (has no administration in time range)   cyclobenzaprine (FLEXERIL) tablet 5 mg (has no administration in time range)   ipratropium-albuterol (DUO-NEB) nebulizer solution 3 mL (has no administration in time range)   ondansetron ODT (ZOFRAN-ODT) disintegrating tablet 4 mg (4 mg Oral Given 2/6/25 1443)   lactated ringers bolus 1,000 mL (0 mL Intravenous Stopped 2/6/25 1716)   ketorolac (TORADOL) injection 15 mg (15 mg Intravenous Given 2/6/25 1628)   oxyCODONE (ROXICODONE) immediate release tablet 5 mg (5 mg Oral Given 2/6/25 1443)   iopamidol (ISOVUE-300) 61 % injection 85 mL (85 mL Intravenous Given 2/6/25 1511)   iopamidol (ISOVUE-370) 76 % injection 75 mL (75 mL Intravenous Given 2/6/25 2116)         MEDICAL DECISION MAKING, PROGRESS, and CONSULTS    All labs, if obtained, have been independently reviewed by me.  All radiology studies, if obtained,  have been reviewed by me and the radiologist dictating the report.  All EKGs, if obtained, have been independently viewed and interpreted by me/my attending physician.      Discussion below represents my analysis of pertinent findings related to patient's condition, differential diagnosis, treatment plan and final disposition.                                          Differential diagnosis:    Sepsis, volvulus, ileus, bowel obstruction, colitis, diverticulitis, pancreatitis, anemia, electrolyte      Additional sources:    - Discussed/ obtained information from independent historians: Sister    - External (non-ED) record review: Patient PCP notes showing history of hypothyroidism and hyperlipidemia    - Chronic or social conditions impacting care:          Orders placed during this visit:  Orders Placed This Encounter   Procedures    COVID PRE-OP / PRE-PROCEDURE SCREENING ORDER (NO ISOLATION) - Swab, Nasopharynx    COVID-19, FLU A/B, RSV PCR 1 HR TAT - Swab, Nasopharynx    CT Abdomen Pelvis With Contrast    CT Angiogram Chest    CT Needle Biopsy Abdomen    Comprehensive Metabolic Panel    Lipase    Urinalysis With Microscopic If Indicated (No Culture) - Urine, Clean Catch    High Sensitivity Troponin T    Lactic Acid, Plasma    Procalcitonin    C-reactive Protein    Magnesium    CBC Auto Differential    Urinalysis, Microscopic Only - Urine, Clean Catch    High Sensitivity Troponin T 1Hr    D-dimer, Quantitative    Protime-INR    Cancer Antigen 19-9    CBC Auto Differential    Comprehensive Metabolic Panel    NPO Diet NPO Type: Strict NPO    Diet: Cardiac; Healthy Heart (2-3 Na+); Fluid Consistency: Thin (IDDSI 0)    Vital Signs    Activity - Ad Blanca    Intake & Output    Weigh Patient    Oral Care    Maintain IV Access    Telemetry - Place Orders & Notify Provider of Results When Patient Experiences Acute Chest Pain, Dysrhythmia or Respiratory Distress    May Be Off Telemetry for Tests    Continuous Pulse Oximetry     Code Status and Medical Interventions: CPR (Attempt to Resuscitate); Full Support    Inpatient Hematology & Oncology Consult    Oxygen Therapy- Nasal Cannula; Titrate 1-6 LPM Per SpO2; 90% or Greater    ECG 12 Lead Chest Pain    Insert Peripheral IV    Inpatient Admission    CBC & Differential         Additional orders considered but not ordered:      ED Course:    Consultants: [facility] medicine    ED Course as of 25 2135   Thu  This a very nice 69-year-old woman with history of  IBS who presents because of severe left abdominal pain.  She first noticed it just left of the umbilicus around midnight last night she had felt fine prior to that.  The pain has now extended to both the left upper and left lower quadrant as well as to the left flank.  She has had retching and dry heaves without vomiting or diarrhea she denies dysuria.  No other complaints at this time. [CC]   1427 She arrived awake and alert but with ongoing discomfort significant pain in the left abdomen.  Tenderness to palpation also left CVA tenderness but she is tender over the paraspinal muscles this may be a strain treating with fluids Toradol Zofran and oxycodone obtaining full abdominal laboratory workup and CT scan of the abdomen pelvis will reevaluate pending initial workup [CC]    CBC showing significant leukocytosis with neutrophilic predominance no elevation in lactate or lipase or procalcitonin  Metabolic panel showing mild hyponatremia and hypochloremia but generally reassuring and nonactionable mildly elevated troponin she denies chest pain urinalysis showing 3-5 whites D-dimer is negative.  CT scan of the abdomen pelvis unfortunately showing what appears to be a pancreatic mass as well as hypodensities in the liver and spleen concerning for possible metastatic disease radiology is recommending MRI pancreas protocol.  I have discussed this with the patient she is agreeable to hospital admission for  further workup she does have a family history of pancreatic cancer.  Medicine team consulted for admission. [CC]      ED Course User Index  [CC] Poli Matute MD              Shared Decision Making:  After my consideration of clinical presentation and any laboratory/radiology studies obtained, I discussed the findings with the patient/patient representative who is in agreement with the treatment plan and the final disposition.   Risks and benefits of discharge and/or observation/admission were discussed.       AS OF 21:35 EST VITALS:    BP - 149/86  HR - 88  TEMP - 98.7 °F (37.1 °C) (Oral)  O2 SATS - 96%                  DIAGNOSIS  Final diagnoses:   Pancreatic mass   Lymphadenopathy   Generalized abdominal pain   Leukocytosis, unspecified type         DISPOSITION  Admit      Please note that portions of this document were completed with voice recognition software.        Poli Matute MD  25      Electronically signed by Poli Matute MD at 25       Marianna Cornell, RN at 25 1853           Arely Mesa    Nursing Report ED to Floor:  Mental status: alert and oriented x4  Ambulatory status: up ad amanda  Oxygen Therapy:  room air  Cardiac Rhythm: NSR  Admitted from: ED  Safety Concerns:  n/a  Precautions: n/a  Social Issues: n/a  ED Room #:  2    ED Nurse Phone Extension - [phone] or may call [phone].      HPI:   Chief Complaint   Patient presents with    Abdominal Pain       Past Medical History:  Past Medical History:   Diagnosis Date    Asthma     Fibroids     Genital HSV     Hypercholesteremia 2008    Off meds ~     Hypothyroid 2003    Meniere disease, right along with tinnitus 2018    Tubular adenoma of colon 2015    f/u scope 2018 normal    Tubular adenoma of colon 2023        Past Surgical History:  Past Surgical History:   Procedure Laterality Date     SECTION  1985    ORIF PATELLA FRACTURE Left 2018    RHINOPLASTY  1990     TONSILLECTOMY  01/1964    TRIGGER FINGER RELEASE Left 08/2013    index finger        Admitting Doctor:   Elina May MD    Consulting Provider(s):  Consults       No orders found from 1/8/2025 to 2/7/2025.             Admitting Diagnosis:       Most Recent Vitals:   Vitals:    02/06/25 1700 02/06/25 1730 02/06/25 1800 02/06/25 1830   BP: 158/99 149/79 163/91 (!) 169/101   BP Location:       Patient Position:       Pulse: 76 73 108    Resp:       Temp:       TempSrc:       SpO2: 97% 98% 98%    Weight:       Height:           Active LDAs/IV Access:   Lines, Drains & Airways       Active LDAs       Name Placement date Placement time Site Days    Peripheral IV 02/06/25 1447 Anterior;Distal;Right Forearm 02/06/25  1447  Forearm  less than 1                    Labs (abnormal labs have a star):   Labs Reviewed   COMPREHENSIVE METABOLIC PANEL - Abnormal; Notable for the following components:       Result Value    Glucose 102 (*)     Sodium 132 (*)     Chloride 94 (*)     All other components within normal limits    Narrative:     GFR Categories in Chronic Kidney Disease (CKD)      GFR Category          GFR (mL/min/1.73)    Interpretation  G1                     90 or greater         Normal or high (1)  G2                      60-89                Mild decrease (1)  G3a                   45-59                Mild to moderate decrease  G3b                   30-44                Moderate to severe decrease  G4                    15-29                Severe decrease  G5                    14 or less           Kidney failure          (1)In the absence of evidence of kidney disease, neither GFR category G1 or G2 fulfill the criteria for CKD.    eGFR calculation 2021 CKD-EPI creatinine equation, which does not include race as a factor   URINALYSIS W/ MICROSCOPIC IF INDICATED (NO CULTURE) - Abnormal; Notable for the following components:    Ketones, UA 15 mg/dL (1+) (*)     Protein, UA 30 mg/dL (1+) (*)     Leuk Esterase,  UA Trace (*)     All other components within normal limits   TROPONIN - Abnormal; Notable for the following components:    HS Troponin T 20 (*)     All other components within normal limits    Narrative:     High Sensitive Troponin T Reference Range:  <14.0 ng/L- Negative Female for AMI  <22.0 ng/L- Negative Male for AMI  >=14 - Abnormal Female indicating possible myocardial injury.  >=22 - Abnormal Male indicating possible myocardial injury.   Clinicians would have to utilize clinical acumen, EKG, Troponin, and serial changes to determine if it is an Acute Myocardial Infarction or myocardial injury due to an underlying chronic condition.        CBC WITH AUTO DIFFERENTIAL - Abnormal; Notable for the following components:    WBC 18.66 (*)     Neutrophil % 84.6 (*)     Lymphocyte % 7.8 (*)     Eosinophil % 0.2 (*)     Neutrophils, Absolute 15.78 (*)     Monocytes, Absolute 1.22 (*)     Immature Grans, Absolute 0.10 (*)     All other components within normal limits   URINALYSIS, MICROSCOPIC ONLY - Abnormal; Notable for the following components:    WBC, UA 3-5 (*)     All other components within normal limits   HIGH SENSITIVITIY TROPONIN T 1HR - Abnormal; Notable for the following components:    HS Troponin T 32 (*)     Troponin T Numeric Delta 12 (*)     Troponin T % Delta 60 (*)     All other components within normal limits    Narrative:     High Sensitive Troponin T Reference Range:  <14.0 ng/L- Negative Female for AMI  <22.0 ng/L- Negative Male for AMI  >=14 - Abnormal Female indicating possible myocardial injury.  >=22 - Abnormal Male indicating possible myocardial injury.   Clinicians would have to utilize clinical acumen, EKG, Troponin, and serial changes to determine if it is an Acute Myocardial Infarction or myocardial injury due to an underlying chronic condition.        COVID-19/FLUA&B/RSV, NP SWAB IN TRANSPORT MEDIA 1 HR TAT - Normal    Narrative:     Fact sheet for providers:  "https://www.fda.gov/media/365845/download    Fact sheet for patients: https://www.fda.gov/media/032231/download    Test performed by PCR.   LIPASE - Normal   LACTIC ACID, PLASMA - Normal   PROCALCITONIN - Normal    Narrative:     As a Marker for Sepsis (Non-Neonates):    1. <0.5 ng/mL represents a low risk of severe sepsis and/or septic shock.  2. >2 ng/mL represents a high risk of severe sepsis and/or septic shock.    As a Marker for Lower Respiratory Tract Infections that require antibiotic therapy:    PCT on Admission    Antibiotic Therapy       6-12 Hrs later    >0.5                Strongly Recommended  >0.25 - <0.5        Recommended   0.1 - 0.25          Discouraged              Remeasure/reassess PCT  <0.1                Strongly Discouraged     Remeasure/reassess PCT    As 28 day mortality risk marker: \"Change in Procalcitonin Result\" (>80% or <=80%) if Day 0 (or Day 1) and Day 4 values are available. Refer to http://www.[employer]pct-calculator.com    Change in PCT <=80%  A decrease of PCT levels below or equal to 80% defines a positive change in PCT test result representing a higher risk for 28-day all-cause mortality of patients diagnosed with severe sepsis for septic shock.    Change in PCT >80%  A decrease of PCT levels of more than 80% defines a negative change in PCT result representing a lower risk for 28-day all-cause mortality of patients diagnosed with severe sepsis or septic shock.      C-REACTIVE PROTEIN - Normal   MAGNESIUM - Normal   COVID PRE-OP / PRE-PROCEDURE SCREENING ORDER (NO ISOLATION)    Narrative:     The following orders were created for panel order COVID PRE-OP / PRE-PROCEDURE SCREENING ORDER (NO ISOLATION) - Swab, Nasopharynx.  Procedure                               Abnormality         Status                     ---------                               -----------         ------                     COVID-19, FLU A/B, RSV P...[[id number]]  Normal              Final result             "     Please view results for these tests on the individual orders.   D-DIMER, QUANTITATIVE   PROTIME-INR   CANCER ANTIGEN 19-9   CBC AND DIFFERENTIAL    Narrative:     The following orders were created for panel order CBC & Differential.  Procedure                               Abnormality         Status                     ---------                               -----------         ------                     CBC Auto Differential[[id number]]        Abnormal            Final result                 Please view results for these tests on the individual orders.       Meds Given in ED:   Medications   HYDROmorphone (DILAUDID) injection 0.5 mg (has no administration in time range)   ondansetron (ZOFRAN) injection 4 mg (has no administration in time range)   ondansetron ODT (ZOFRAN-ODT) disintegrating tablet 4 mg (4 mg Oral Given 2/6/25 1443)   lactated ringers bolus 1,000 mL (0 mL Intravenous Stopped 2/6/25 1716)   ketorolac (TORADOL) injection 15 mg (15 mg Intravenous Given 2/6/25 1628)   oxyCODONE (ROXICODONE) immediate release tablet 5 mg (5 mg Oral Given 2/6/25 1443)   iopamidol (ISOVUE-300) 61 % injection 85 mL (85 mL Intravenous Given 2/6/25 1511)           Last NIH score:                                                          Dysphagia screening results:        Holli Coma Scale:  No data recorded     CIWA:        Restraint Type:            Isolation Status:  No active isolations          Electronically signed by Marianna Cornell, RN at 02/06/25 0226       Current Facility-Administered Medications   Medication Dose Route Frequency Provider Last Rate Last Admin    acetaminophen (TYLENOL) tablet 650 mg  650 mg Oral Q4H PRN Siobhan Davidson APRN        Or    acetaminophen (TYLENOL) 160 MG/5ML oral solution 650 mg  650 mg Oral Q4H PRN Siobhan Davidson APRN        Or    acetaminophen (TYLENOL) suppository 650 mg  650 mg Rectal Q4H PRN Siobhan Davidson APRN        sennosides-docusate (PERICOLACE) 8.6-50 MG per tablet 2 tablet  2 tablet  Oral BID PRN Siobhan Davidson APRN        And    polyethylene glycol (MIRALAX) packet 17 g  17 g Oral Daily PRN Siobhan Davidson APRN        And    bisacodyl (DULCOLAX) EC tablet 5 mg  5 mg Oral Daily PRN Siobhan Davidson APRN        And    bisacodyl (DULCOLAX) suppository 10 mg  10 mg Rectal Daily PRN Siobhan Davidson APRN        Calcium Replacement - Follow Nurse / BPA Driven Protocol   Not Applicable PRN Siobhan Davidson APRN        cyclobenzaprine (FLEXERIL) tablet 5 mg  5 mg Oral Nightly Siobhan Davidson APRN   5 mg at 02/06/25 2153    [Held by provider] Enoxaparin Sodium (LOVENOX) syringe 40 mg  40 mg Subcutaneous Daily Siobhan Davidson APRN        HYDROmorphone (DILAUDID) injection 0.5 mg  0.5 mg Intravenous Q2H PRN Elina May MD        ipratropium-albuterol (DUO-NEB) nebulizer solution 3 mL  3 mL Nebulization Q6H PRN Siobhan Davidson APRN        levothyroxine (SYNTHROID, LEVOTHROID) tablet 75 mcg  75 mcg Oral Nightly Elina May MD        Magnesium Standard Dose Replacement - Follow Nurse / BPA Driven Protocol   Not Applicable PRN Siobhan Davidson APRN        montelukast (SINGULAIR) tablet 10 mg  10 mg Oral Nightly Siobhan Davidson APRN   10 mg at 02/06/25 2153    ondansetron (ZOFRAN) injection 4 mg  4 mg Intravenous Q6H PRN Elina May MD        oxybutynin XL (DITROPAN-XL) 24 hr tablet 5 mg  5 mg Oral Daily Siobhan Davidson APRN        Phosphorus Replacement - Follow Nurse / BPA Driven Protocol   Not Applicable PRN Siobhan Davidson APRN        Potassium Replacement - Follow Nurse / BPA Driven Protocol   Not Applicable PRN Siobhan Davidson APRN        sodium chloride 0.9 % flush 10 mL  10 mL Intravenous Q12H Siobhan Davidson APRN   10 mL at 02/06/25 2153    sodium chloride 0.9 % flush 10 mL  10 mL Intravenous PRN Siobhan Davidson APRN         Lab Results (all)       Procedure Component Value Units Date/Time    Comprehensive Metabolic Panel [[id number]]  (Abnormal) Collected: 02/07/25 0518    Specimen: Blood Updated: 02/07/25 0649     Glucose 91 mg/dL      BUN 8  mg/dL      Creatinine 0.78 mg/dL      Sodium 134 mmol/L      Potassium 4.6 mmol/L      Chloride 96 mmol/L      CO2 30.0 mmol/L      Calcium 9.0 mg/dL      Total Protein 6.3 g/dL      Albumin 4.0 g/dL      ALT (SGPT) 15 U/L      AST (SGOT) 25 U/L      Alkaline Phosphatase 96 U/L      Total Bilirubin 0.4 mg/dL      Globulin 2.3 gm/dL      Comment: Calculated Result        A/G Ratio 1.7 g/dL      BUN/Creatinine Ratio 10.3     Anion Gap 8.0 mmol/L      eGFR 82.3 mL/min/1.73     Narrative:      GFR Categories in Chronic Kidney Disease (CKD)      GFR Category          GFR (mL/min/1.73)    Interpretation  G1                     90 or greater         Normal or high (1)  G2                      60-89                Mild decrease (1)  G3a                   45-59                Mild to moderate decrease  G3b                   30-44                Moderate to severe decrease  G4                    15-29                Severe decrease  G5                    14 or less           Kidney failure          (1)In the absence of evidence of kidney disease, neither GFR category G1 or G2 fulfill the criteria for CKD.    eGFR calculation 2021 CKD-EPI creatinine equation, which does not include race as a factor    CBC Auto Differential [[id number]]  (Abnormal) Collected: 02/07/25 0518    Specimen: Blood Updated: 02/07/25 0608     WBC 11.35 10*3/mm3      RBC 4.78 10*6/mm3      Hemoglobin 14.2 g/dL      Hematocrit 42.6 %      MCV 89.1 fL      MCH 29.7 pg      MCHC 33.3 g/dL      RDW 12.8 %      RDW-SD 42.3 fl      MPV 9.4 fL      Platelets 278 10*3/mm3      Neutrophil % 76.0 %      Lymphocyte % 13.0 %      Monocyte % 9.3 %      Eosinophil % 0.9 %      Basophil % 0.4 %      Immature Grans % 0.4 %      Neutrophils, Absolute 8.62 10*3/mm3      Lymphocytes, Absolute 1.47 10*3/mm3      Monocytes, Absolute 1.06 10*3/mm3      Eosinophils, Absolute 0.10 10*3/mm3      Basophils, Absolute 0.05 10*3/mm3      Immature Grans, Absolute 0.05 10*3/mm3       "nRBC 0.0 /100 WBC     Cancer Antigen 19-9 [[id number]]  (Normal) Collected: 02/06/25 1837    Specimen: Blood Updated: 02/06/25 2325     CA 19-9 15.5 U/mL     Narrative:      Results may be falsely decreased if patient taking Biotin.    Testing Method: Roche [employer] Electrochemiluminescence Immunoassay(ECLIA)  Values obtained with different assay methods or kits cannot be used interchangeably.    D-dimer, Quantitative [[id number]]  (Normal) Collected: 02/06/25 1837    Specimen: Blood Updated: 02/06/25 1952     D-Dimer, Quantitative 0.60 MCGFEU/mL     Narrative:      According to the assay 's published package insert, a normal (<0.50 MCGFEU/mL) D-dimer result in conjunction with a non-high clinical probability assessment, excludes deep vein thrombosis (DVT) and pulmonary embolism (PE) with high sensitivity.    D-dimer values increase with age and this can make VTE exclusion of an older population difficult. To address this, the [employer], based on best available evidence and recent guidelines, recommends that clinicians use age-adjusted D-dimer thresholds in patients greater than 50 years of age with: a) a low probability of PE who do not meet all Pulmonary Embolism Rule Out Criteria, or b) in those with intermediate probability of PE.   The formula for an age-adjusted D-dimer cut-off is \"age/100\".  For example, a 60 year old patient would have an age-adjusted cut-off of 0.60 MCGFEU/mL and an 80 year old 0.80 MCGFEU/mL.    Protime-INR [[id number]]  (Normal) Collected: 02/06/25 1837    Specimen: Blood Updated: 02/06/25 1952     Protime 12.8 Seconds      INR 0.95    High Sensitivity Troponin T 1Hr [[id number]]  (Abnormal) Collected: 02/06/25 1624    Specimen: Blood Updated: 02/06/25 1702     HS Troponin T 32 ng/L      Troponin T Numeric Delta 12 ng/L      Troponin T % Delta 60    Narrative:      High Sensitive Troponin T Reference Range:  <14.0 ng/L- Negative Female for AMI  <22.0 ng/L- " Negative Male for AMI  >=14 - Abnormal Female indicating possible myocardial injury.  >=22 - Abnormal Male indicating possible myocardial injury.   Clinicians would have to utilize clinical acumen, EKG, Troponin, and serial changes to determine if it is an Acute Myocardial Infarction or myocardial injury due to an underlying chronic condition.         COVID PRE-OP / PRE-PROCEDURE SCREENING ORDER (NO ISOLATION) - Swab, Nasopharynx [[id number]]  (Normal) Collected: 02/06/25 1434    Specimen: Swab from Nasopharynx Updated: 02/06/25 1600    Narrative:      The following orders were created for panel order COVID PRE-OP / PRE-PROCEDURE SCREENING ORDER (NO ISOLATION) - Swab, Nasopharynx.  Procedure                               Abnormality         Status                     ---------                               -----------         ------                     COVID-19, FLU A/B, RSV P...[[id number]]  Normal              Final result                 Please view results for these tests on the individual orders.    COVID-19, FLU A/B, RSV PCR 1 HR TAT - Swab, Nasopharynx [[id number]]  (Normal) Collected: 02/06/25 1434    Specimen: Swab from Nasopharynx Updated: 02/06/25 1600     COVID19 Not Detected     Influenza A PCR Not Detected     Influenza B PCR Not Detected     RSV, PCR Not Detected    Narrative:      Fact sheet for providers: https://www.fda.gov/media/522659/download    Fact sheet for patients: https://www.fda.gov/media/904533/download    Test performed by PCR.    Procalcitonin [[id number]]  (Normal) Collected: 02/06/25 1433    Specimen: Blood Updated: 02/06/25 1527     Procalcitonin 0.03 ng/mL     Narrative:      As a Marker for Sepsis (Non-Neonates):    1. <0.5 ng/mL represents a low risk of severe sepsis and/or septic shock.  2. >2 ng/mL represents a high risk of severe sepsis and/or septic shock.    As a Marker for Lower Respiratory Tract Infections that require antibiotic therapy:    PCT on Admission    Antibiotic  "Therapy       6-12 Hrs later    >0.5                Strongly Recommended  >0.25 - <0.5        Recommended   0.1 - 0.25          Discouraged              Remeasure/reassess PCT  <0.1                Strongly Discouraged     Remeasure/reassess PCT    As 28 day mortality risk marker: \"Change in Procalcitonin Result\" (>80% or <=80%) if Day 0 (or Day 1) and Day 4 values are available. Refer to [email or web address]    Change in PCT <=80%  A decrease of PCT levels below or equal to 80% defines a positive change in PCT test result representing a higher risk for 28-day all-cause mortality of patients diagnosed with severe sepsis for septic shock.    Change in PCT >80%  A decrease of PCT levels of more than 80% defines a negative change in PCT result representing a lower risk for 28-day all-cause mortality of patients diagnosed with severe sepsis or septic shock.       Comprehensive Metabolic Panel [[id number]]  (Abnormal) Collected: 02/06/25 1433    Specimen: Blood Updated: 02/06/25 1523     Glucose 102 mg/dL      BUN 12 mg/dL      Creatinine 0.77 mg/dL      Sodium 132 mmol/L      Potassium 3.7 mmol/L      Comment: Slight hemolysis detected by analyzer. Result may be falsely elevated.        Chloride 94 mmol/L      CO2 27.0 mmol/L      Calcium 9.0 mg/dL      Total Protein 6.7 g/dL      Albumin 4.3 g/dL      ALT (SGPT) 17 U/L      AST (SGOT) 24 U/L      Alkaline Phosphatase 99 U/L      Total Bilirubin 0.3 mg/dL      Globulin 2.4 gm/dL      Comment: Calculated Result        A/G Ratio 1.8 g/dL      BUN/Creatinine Ratio 15.6     Anion Gap 11.0 mmol/L      eGFR 83.6 mL/min/1.73     Narrative:      GFR Categories in Chronic Kidney Disease (CKD)      GFR Category          GFR (mL/min/1.73)    Interpretation  G1                     90 or greater         Normal or high (1)  G2                      60-89                Mild decrease (1)  G3a                   45-59                Mild to moderate decrease  G3b            "        30-44                Moderate to severe decrease  G4                    15-29                Severe decrease  G5                    14 or less           Kidney failure          (1)In the absence of evidence of kidney disease, neither GFR category G1 or G2 fulfill the criteria for CKD.    eGFR calculation 2021 CKD-EPI creatinine equation, which does not include race as a factor    Lipase [[id number]]  (Normal) Collected: 02/06/25 1433    Specimen: Blood Updated: 02/06/25 1523     Lipase 30 U/L     Magnesium [[id number]]  (Normal) Collected: 02/06/25 1433    Specimen: Blood Updated: 02/06/25 1523     Magnesium 1.7 mg/dL     High Sensitivity Troponin T [[id number]]  (Abnormal) Collected: 02/06/25 1433    Specimen: Blood Updated: 02/06/25 1523     HS Troponin T 20 ng/L     Narrative:      High Sensitive Troponin T Reference Range:  <14.0 ng/L- Negative Female for AMI  <22.0 ng/L- Negative Male for AMI  >=14 - Abnormal Female indicating possible myocardial injury.  >=22 - Abnormal Male indicating possible myocardial injury.   Clinicians would have to utilize clinical acumen, EKG, Troponin, and serial changes to determine if it is an Acute Myocardial Infarction or myocardial injury due to an underlying chronic condition.         C-reactive Protein [[id number]]  (Normal) Collected: 02/06/25 1433    Specimen: Blood Updated: 02/06/25 1523     C-Reactive Protein <0.30 mg/dL     Lactic Acid, Plasma [[id number]]  (Normal) Collected: 02/06/25 1433    Specimen: Blood Updated: 02/06/25 1516     Lactate 1.7 mmol/L      Comment: Falsely depressed results may occur on samples drawn from patients receiving N-Acetylcysteine (NAC) or Metamizole.       CBC & Differential [[id number]]  (Abnormal) Collected: 02/06/25 1433    Specimen: Blood Updated: 02/06/25 1508    Narrative:      The following orders were created for panel order CBC & Differential.  Procedure                               Abnormality         Status                      ---------                               -----------         ------                     CBC Auto Differential[[id number]]        Abnormal            Final result                 Please view results for these tests on the individual orders.    CBC Auto Differential [[id number]]  (Abnormal) Collected: 02/06/25 1433    Specimen: Blood Updated: 02/06/25 1508     WBC 18.66 10*3/mm3      RBC 4.61 10*6/mm3      Hemoglobin 13.8 g/dL      Hematocrit 40.9 %      MCV 88.7 fL      MCH 29.9 pg      MCHC 33.7 g/dL      RDW 12.4 %      RDW-SD 40.7 fl      MPV 9.5 fL      Platelets 304 10*3/mm3      Neutrophil % 84.6 %      Lymphocyte % 7.8 %      Monocyte % 6.5 %      Eosinophil % 0.2 %      Basophil % 0.4 %      Immature Grans % 0.5 %      Neutrophils, Absolute 15.78 10*3/mm3      Lymphocytes, Absolute 1.45 10*3/mm3      Monocytes, Absolute 1.22 10*3/mm3      Eosinophils, Absolute 0.03 10*3/mm3      Basophils, Absolute 0.08 10*3/mm3      Immature Grans, Absolute 0.10 10*3/mm3      nRBC 0.0 /100 WBC     Urinalysis With Microscopic If Indicated (No Culture) - Urine, Clean Catch [[id number]]  (Abnormal) Collected: 02/06/25 1444    Specimen: Urine, Clean Catch Updated: 02/06/25 1508     Color, UA Yellow     Appearance, UA Clear     pH, UA 7.0     Specific Gravity, UA 1.019     Glucose, UA Negative     Ketones, UA 15 mg/dL (1+)     Bilirubin, UA Negative     Blood, UA Negative     Protein, UA 30 mg/dL (1+)     Leuk Esterase, UA Trace     Nitrite, UA Negative     Urobilinogen, UA 0.2 E.U./dL    Urinalysis, Microscopic Only - Urine, Clean Catch [[id number]]  (Abnormal) Collected: 02/06/25 1444    Specimen: Urine, Clean Catch Updated: 02/06/25 1508     RBC, UA 0-2 /HPF      WBC, UA 3-5 /HPF      Bacteria, UA None Seen /HPF      Squamous Epithelial Cells, UA 0-2 /HPF      Hyaline Casts, UA 0-6 /LPF      Methodology Automated Microscopy          Imaging Results (All)       Procedure Component Value Units Date/Time    CT Angiogram Chest  [[id number]] Collected: 02/06/25 2124     Updated: 02/06/25 2150    Narrative:      CT ANGIOGRAM CHEST    Date of Exam: 2/6/2025 9:04 PM EST    Indication: Pancreatic lesions suspicious for malignancy.  R/O any cardiothoracic involvement.    Comparison: CT abdomen pelvis 2/6/2025    Technique: CTA of the chest was performed before and after the uneventful intravenous administration of 75 mL Isovue-370. Reconstructed coronal and sagittal images were also obtained. In addition, a 3-D volume rendered image was created for   interpretation. Automated exposure control and iterative reconstruction methods were used.    Findings:  Visualized soft tissues of the lower neck are without acute abnormality. There is normal contrast opacification of the aortic arch branch vasculature. The heart is mildly enlarged. Coronary artery calcification is noted. Negative for pericardial effusion   or pleural effusion. Negative for pulmonary embolus. No mediastinal or hilar adenopathy. No axillary adenopathy.    Trachea and mainstem bronchi are patent. There is no consolidation or findings of pneumonia. Negative for pneumothorax. Right lower lobe 4 mm nodule abutting the pulmonary fissure likely benign intrapulmonary lymph node (7/56). Nonspecific right upper   lobe 2 mm nodule (7/39). Right upper lobe anterior segment 3 mm nodule (7/40). Nodule in the medial anterior right upper lobe measures 5 mm (7/56). Tiny left lower lobe 2 mm nodule (7/60).    Please see separately dictated abdominal CT for findings below the diaphragm including pancreatic mass and hepatic and splenic lesions. No aggressive osseous lesion. No acute fracture.      Impression:      Impression:  1. No acute intrathoracic process.  2. No intrathoracic adenopathy or definite findings of intrathoracic metastatic disease.  3. Several small scattered pulmonary nodules largest in anterior right upper lobe measuring 5 mm, recommend attention on follow-up.  4. Please see  separate dictated abdominal CT for findings below the diaphragm.              Electronically Signed: Mike Quinteros MD    2/6/2025 9:47 PM EST    Workstation ID: [id number]    CT Abdomen Pelvis With Contrast [[id number]] Collected: 02/06/25 1523     Updated: 02/06/25 1536    Narrative:      CT ABDOMEN PELVIS W CONTRAST    Date of Exam: 2/6/2025 3:09 PM EST    Indication: severe left abdominal/flank pain, wretching.    Comparison: None available.    Technique: Axial CT images were obtained of the abdomen and pelvis following the uneventful intravenous administration of 85 mL Isovue-300. Reconstructed coronal and sagittal images were also obtained. Automated exposure control and iterative   construction methods were used.      Findings:    Liver: The liver is unremarkable in morphology. There are numerous indeterminate liver masses measuring up to 1.6 cm. No biliary dilation is seen.    Gallbladder: Unremarkable.    Pancreas: Suggestion of a 2.6 cm mass within the body of the pancreas with associated coarse calcification (series 2, image 40). There is upstream pancreatic ductal dilation with atrophy of the pancreatic tail. Head and neck of the pancreas appear   grossly unremarkable with decompressed pancreatic duct.    Spleen: Numerous small, indeterminate splenic hypodensities    Adrenal glands: 3.3 cm mass or enlarged lymph node adjacent to the left adrenal gland. Right adrenal gland is unremarkable.    Genitourinary tract: The kidneys are unremarkable. No hydronephrosis is seen. The visualized portions of the ureters and urinary bladder appear unremarkable. Pelvic organs demonstrate no acute abnormality.    Gastrointestinal tract: Colonic diverticulosis is present. Hollow viscera appear otherwise unremarkable. There is no evidence of bowel obstruction.    Appendix: No findings to suggest acute appendicitis.    Other findings: Multiple enlarged upper abdominal lymph nodes. Enlarged aortocaval lymph node measures 17 mm  short axis (series 2, image 49). Additional prominent retroperitoneal lymph nodes are seen. Several prominent periportal and peripancreatic lymph   nodes.    Bones and soft tissues: No acute or suspicious osseous or soft tissue lesion is identified.    Lung bases: The visualized lung bases are clear.      Impression:      Impression:  1.2.6 cm mass within the body of the pancreas with associated coarse calcification. There is upstream pancreatic ductal dilation with atrophy of the pancreatic tail. Appearance is concerning for primary pancreatic malignancy.  2.Numerous small hypoenhancing masses within the liver and spleen, concerning for metastatic lesions.  3.Upper abdominal/retroperitoneal lymphadenopathy, including a 3.3 cm mass adjacent to the left adrenal gland.  4.Recommend pancreatic protocol MRI with and without IV contrast for further characterization of these findings.        Electronically Signed: Eitan Martin MD    2/6/2025 3:33 PM EST    Workstation ID: [id number]          Physician Progress Notes (all)    No notes of this type exist for this encounter.       Consult Notes (all)    No notes of this type exist for this encounter.

## 2025-02-08 VITALS
HEIGHT: 65 IN | OXYGEN SATURATION: 93 % | BODY MASS INDEX: 21.99 KG/M2 | SYSTOLIC BLOOD PRESSURE: 129 MMHG | DIASTOLIC BLOOD PRESSURE: 81 MMHG | TEMPERATURE: 98.3 F | RESPIRATION RATE: 18 BRPM | HEART RATE: 96 BPM | WEIGHT: 132 LBS

## 2025-02-08 PROBLEM — E87.1 HYPONATREMIA: Status: ACTIVE | Noted: 2025-02-08

## 2025-02-08 LAB
ALBUMIN SERPL-MCNC: 3.6 G/DL (ref 3.5–5.2)
ALBUMIN/GLOB SERPL: 1.3 G/DL
ALP SERPL-CCNC: 96 U/L (ref 39–117)
ALT SERPL W P-5'-P-CCNC: 19 U/L (ref 1–33)
ANION GAP SERPL CALCULATED.3IONS-SCNC: 10 MMOL/L (ref 5–15)
ANION GAP SERPL CALCULATED.3IONS-SCNC: 13 MMOL/L (ref 5–15)
AST SERPL-CCNC: 25 U/L (ref 1–32)
BILIRUB SERPL-MCNC: 0.6 MG/DL (ref 0–1.2)
BUN SERPL-MCNC: 12 MG/DL (ref 8–23)
BUN SERPL-MCNC: 12 MG/DL (ref 8–23)
BUN/CREAT SERPL: 16 (ref 7–25)
BUN/CREAT SERPL: 18.2 (ref 7–25)
CALCIUM SPEC-SCNC: 8.6 MG/DL (ref 8.6–10.5)
CALCIUM SPEC-SCNC: 8.9 MG/DL (ref 8.6–10.5)
CHLORIDE SERPL-SCNC: 94 MMOL/L (ref 98–107)
CHLORIDE SERPL-SCNC: 95 MMOL/L (ref 98–107)
CO2 SERPL-SCNC: 22 MMOL/L (ref 22–29)
CO2 SERPL-SCNC: 22 MMOL/L (ref 22–29)
CREAT SERPL-MCNC: 0.66 MG/DL (ref 0.57–1)
CREAT SERPL-MCNC: 0.75 MG/DL (ref 0.57–1)
DEPRECATED RDW RBC AUTO: 42.9 FL (ref 37–54)
EGFRCR SERPLBLD CKD-EPI 2021: 86.3 ML/MIN/1.73
EGFRCR SERPLBLD CKD-EPI 2021: 95.1 ML/MIN/1.73
ERYTHROCYTE [DISTWIDTH] IN BLOOD BY AUTOMATED COUNT: 13 % (ref 12.3–15.4)
GLOBULIN UR ELPH-MCNC: 2.7 GM/DL
GLUCOSE SERPL-MCNC: 113 MG/DL (ref 65–99)
GLUCOSE SERPL-MCNC: 94 MG/DL (ref 65–99)
HCT VFR BLD AUTO: 41.3 % (ref 34–46.6)
HGB BLD-MCNC: 13.8 G/DL (ref 12–15.9)
MCH RBC QN AUTO: 29.8 PG (ref 26.6–33)
MCHC RBC AUTO-ENTMCNC: 33.4 G/DL (ref 31.5–35.7)
MCV RBC AUTO: 89.2 FL (ref 79–97)
PLATELET # BLD AUTO: 256 10*3/MM3 (ref 140–450)
PMV BLD AUTO: 9.3 FL (ref 6–12)
POTASSIUM SERPL-SCNC: 3.8 MMOL/L (ref 3.5–5.2)
POTASSIUM SERPL-SCNC: 3.9 MMOL/L (ref 3.5–5.2)
PROT SERPL-MCNC: 6.3 G/DL (ref 6–8.5)
QT INTERVAL: 392 MS
QTC INTERVAL: 441 MS
RBC # BLD AUTO: 4.63 10*6/MM3 (ref 3.77–5.28)
SODIUM SERPL-SCNC: 127 MMOL/L (ref 136–145)
SODIUM SERPL-SCNC: 129 MMOL/L (ref 136–145)
WBC NRBC COR # BLD AUTO: 16.45 10*3/MM3 (ref 3.4–10.8)

## 2025-02-08 PROCEDURE — 80053 COMPREHEN METABOLIC PANEL: CPT | Performed by: STUDENT IN AN ORGANIZED HEALTH CARE EDUCATION/TRAINING PROGRAM

## 2025-02-08 PROCEDURE — 99239 HOSP IP/OBS DSCHRG MGMT >30: CPT | Performed by: STUDENT IN AN ORGANIZED HEALTH CARE EDUCATION/TRAINING PROGRAM

## 2025-02-08 PROCEDURE — 85027 COMPLETE CBC AUTOMATED: CPT | Performed by: STUDENT IN AN ORGANIZED HEALTH CARE EDUCATION/TRAINING PROGRAM

## 2025-02-08 PROCEDURE — 25010000002 ONDANSETRON PER 1 MG: Performed by: INTERNAL MEDICINE

## 2025-02-08 PROCEDURE — 25010000002 HYDROMORPHONE PER 4 MG: Performed by: INTERNAL MEDICINE

## 2025-02-08 RX ORDER — CYCLOBENZAPRINE HCL 10 MG
5 TABLET ORAL 3 TIMES DAILY PRN
Status: DISCONTINUED | OUTPATIENT
Start: 2025-02-08 | End: 2025-02-08

## 2025-02-08 RX ORDER — CYCLOBENZAPRINE HCL 10 MG
10 TABLET ORAL 3 TIMES DAILY PRN
Status: DISCONTINUED | OUTPATIENT
Start: 2025-02-08 | End: 2025-02-08

## 2025-02-08 RX ORDER — AMOXICILLIN 250 MG
2 CAPSULE ORAL 2 TIMES DAILY
Qty: 60 TABLET | Refills: 0 | Status: SHIPPED | OUTPATIENT
Start: 2025-02-08

## 2025-02-08 RX ORDER — CYCLOBENZAPRINE HCL 10 MG
10 TABLET ORAL 3 TIMES DAILY PRN
Qty: 90 TABLET | Refills: 0 | Status: SHIPPED | OUTPATIENT
Start: 2025-02-08

## 2025-02-08 RX ORDER — ONDANSETRON 4 MG/1
4 TABLET, ORALLY DISINTEGRATING ORAL EVERY 8 HOURS PRN
Qty: 21 TABLET | Refills: 0 | Status: SHIPPED | OUTPATIENT
Start: 2025-02-08

## 2025-02-08 RX ORDER — OXYCODONE AND ACETAMINOPHEN 5; 325 MG/1; MG/1
1 TABLET ORAL EVERY 6 HOURS PRN
Qty: 12 TABLET | Refills: 0 | Status: SHIPPED | OUTPATIENT
Start: 2025-02-08

## 2025-02-08 RX ORDER — CYCLOBENZAPRINE HCL 10 MG
10 TABLET ORAL 3 TIMES DAILY PRN
Status: DISCONTINUED | OUTPATIENT
Start: 2025-02-08 | End: 2025-02-08 | Stop reason: HOSPADM

## 2025-02-08 RX ADMIN — ONDANSETRON 4 MG: 2 INJECTION INTRAMUSCULAR; INTRAVENOUS at 05:15

## 2025-02-08 RX ADMIN — HYDROMORPHONE HYDROCHLORIDE 0.5 MG: 1 INJECTION, SOLUTION INTRAMUSCULAR; INTRAVENOUS; SUBCUTANEOUS at 05:15

## 2025-02-08 RX ADMIN — CYCLOBENZAPRINE HYDROCHLORIDE 10 MG: 10 TABLET, FILM COATED ORAL at 08:52

## 2025-02-08 RX ADMIN — Medication 10 ML: at 08:32

## 2025-02-08 NOTE — DISCHARGE SUMMARY
[facility] Medicine Services  DISCHARGE SUMMARY    Patient Name: Arely Mesa  : 1955  MRN: [id number]    Date of Admission: 2025  3:33 PM  Date of Discharge: 2025  Primary Care Physician: Mohini Baeza MD    Consults       Date and Time Order Name Status Description    2025  8:41 PM Inpatient Hematology & Oncology Consult Completed             Hospital Course     Presenting Problem: Pancreatic mass with metastasis    Active Hospital Problems    Diagnosis  POA    **Pancreatic mass with metastasis to liver, spleen, bone & associated lymphadenopathy [K86.89]  Yes    Hyponatremia [E87.1]  Yes    Overactive bladder [N32.81]  Unknown    Hypothyroidism (acquired) [E03.9]  Yes      Resolved Hospital Problems   No resolved problems to display.          Hospital Course:  Arely Mesa is a 69 y.o. female with significant medical history for Ménière's disease, asthma, HLD, and hypothyroidism, who presented to [facility] for evaluation of abdominal pain.  Found to have likely metastatic pancreatic cancer.     This patient's problems and plans were partially entered by my partner and updated as appropriate by me 25.     Pancreatic mass w metastasis to liver, spleen, LAD, possibly lung  -CT reveals 2.6 cm pancreatic mass, concerning for primary pancreatic malignancy.  Numerous masses on liver and spleen.  0.3 cm mass left adrenal gland.  -CT of chest with multiple nodules, NO PE  -Discussed imaging results with patient  -CA 19-9 15  -S/p US-guided biopsy on , follow-up results outpatient  -MRI abdomen result reviewed w patient  -Heme-onc consulted, discussed with Dr. Negrete  -PRN percocet, PRN zofran, PRN flexeril on DC for patient    Hyponatremia  -Improved to 129 prior to discharge, discussed with patient regarding good p.o. intake on discharge and following up with PCP for repeat labs next week     Asthma     Hypothyroidism  -Continue  levothyroxine     Overactive bladder     Patient is a physician at [employer]      Discharge Follow Up Recommendations for outpatient labs/diagnostics:   -Follow-up with primary care doctor in 5 to 7 days regarding this hospitalization, repeat labs (CBC, CMP). You will need to call on Monday to make this appointment.  -Follow-up with Dr. Negrete in 1 week to go over biopsy results and next steps. If you haven't heard from his clinic by Monday, Feb 10th PM, please call his clinic to schedule  --------------  Your sodium had improved to 129 prior to discharge  No driving while taking opioid pain medication  Do not mix alcohol with sedating medications  You can split the flexeril in half if needed to take 5mg three times a day as needed or you can take the full dose of flexeril 10mg three times a day as needed    Day of Discharge     HPI:   Patient with some back pain and muscle spasms.  No abdominal pain.  No current nausea.    Review of Systems  As above    Vital Signs:   Temp:  [97 °F (36.1 °C)-98.6 °F (37 °C)] 98.3 °F (36.8 °C)  Heart Rate:  [] 96  Resp:  [11-18] 18  BP: (102-137)/(73-92) 129/81  Flow (L/min) (Oxygen Therapy):  [2] 2      Physical Exam:  Constitutional: No acute distress, awake, alert, sitting up  HENT: NCAT, mucous membranes moist  Respiratory: Clear to auscultation bilaterally, respiratory effort normal   Cardiovascular: RRR, no murmurs  Gastrointestinal: Normoactive bowel sounds, soft, nontender, nondistended, biopsy site w bandage in place  Musculoskeletal: No bilateral ankle edema  Psychiatric: Appropriate affect, cooperative  Neurologic: Alert, moves all extremities, symmetric facies, speech clear  Skin: No rashes on exposed skin    Pertinent  and/or Most Recent Results     LAB RESULTS:      Lab 02/08/25  0520 02/07/25  0518 02/06/25  1837 02/06/25  1433   WBC 16.45* 11.35*  --  18.66*   HEMOGLOBIN 13.8 14.2  --  13.8   HEMATOCRIT 41.3 42.6  --  40.9   PLATELETS  256 278  --  304   NEUTROS ABS  --  8.62*  --  15.78*   IMMATURE GRANS (ABS)  --  0.05  --  0.10*   LYMPHS ABS  --  1.47  --  1.45   MONOS ABS  --  1.06*  --  1.22*   EOS ABS  --  0.10  --  0.03   MCV 89.2 89.1  --  88.7   CRP  --   --   --  <0.30   PROCALCITONIN  --   --   --  0.03   LACTATE  --   --   --  1.7   PROTIME  --   --  12.8  --    D DIMER QUANT  --   --  0.60  --          Lab 02/08/25  0941 02/08/25  0520 02/07/25  0518 02/06/25  1433   SODIUM 129* 127* 134* 132*   POTASSIUM 3.9 3.8 4.6 3.7   CHLORIDE 94* 95* 96* 94*   CO2 22.0 22.0 30.0* 27.0   ANION GAP 13.0 10.0 8.0 11.0   BUN 12 12 8 12   CREATININE 0.75 0.66 0.78 0.77   EGFR 86.3 95.1 82.3 83.6   GLUCOSE 113* 94 91 102*   CALCIUM 8.9 8.6 9.0 9.0   MAGNESIUM  --   --   --  1.7         Lab 02/08/25  0520 02/07/25  0518 02/06/25  1433   TOTAL PROTEIN 6.3 6.3 6.7   ALBUMIN 3.6 4.0 4.3   GLOBULIN 2.7 2.3 2.4   ALT (SGPT) 19 15 17   AST (SGOT) 25 25 24   BILIRUBIN 0.6 0.4 0.3   ALK PHOS 96 96 99   LIPASE  --   --  30         Lab 02/06/25  1837 02/06/25  1624 02/06/25  1433   HSTROP T  --  32* 20*   PROTIME 12.8  --   --    INR 0.95  --   --                  Brief Urine Lab Results  (Last result in the past 365 days)        Color   Clarity   Blood   Leuk Est   Nitrite   Protein   CREAT   Urine HCG        02/06/25 1444 Yellow   Clear   Negative   Trace   Negative   30 mg/dL (1+)                 Microbiology Results (last 10 days)       Procedure Component Value - Date/Time    COVID PRE-OP / PRE-PROCEDURE SCREENING ORDER (NO ISOLATION) - Swab, Nasopharynx [[id number]]  (Normal) Collected: 02/06/25 1434    Lab Status: Final result Specimen: Swab from Nasopharynx Updated: 02/06/25 1600    Narrative:      The following orders were created for panel order COVID PRE-OP / PRE-PROCEDURE SCREENING ORDER (NO ISOLATION) - Swab, Nasopharynx.  Procedure                               Abnormality         Status                     ---------                                -----------         ------                     COVID-19, FLU A/B, RSV P...[[id number]]  Normal              Final result                 Please view results for these tests on the individual orders.    COVID-19, FLU A/B, RSV PCR 1 HR TAT - Swab, Nasopharynx [[id number]]  (Normal) Collected: 02/06/25 1434    Lab Status: Final result Specimen: Swab from Nasopharynx Updated: 02/06/25 1600     COVID19 Not Detected     Influenza A PCR Not Detected     Influenza B PCR Not Detected     RSV, PCR Not Detected    Narrative:      Fact sheet for providers: https://www.fda.gov/media/227052/download    Fact sheet for patients: https://www.fda.gov/media/461280/download    Test performed by PCR.            MRI Abdomen With & Without Contrast    Result Date: 2/8/2025  MRI ABDOMEN W WO CONTRAST Date of Exam: 2/7/2025 9:04 PM EST Indication: Pancreas mass with liver and splenic metastases on CT.  Comparison: CT abdomen and pelvis 2/6/2025 Technique:  Routine multiplanar/multisequence images of the abdomen were obtained before and after the uneventful administration of 15 mL Multihance. Findings: Lower chest demonstrates normal heart size. Negative for pericardial effusion or pleural effusion. Lung bases without consolidation. There are innumerable T1 hypointense, T2 hyperintense liver lesions most concerning for hepatic metastasis. Many of these lesions measure below 1 cm. The largest lesion is within the left hepatic lobe at segment 4A near the dome measuring 1.5 x 1.4 cm (4/19). Lesions appear hypoenhancing with peripheral rim enhancement on the delayed images best appreciated at largest lesion in the left hepatic lobe near dome. Gallbladder is present. Negative for cholelithiasis. No gallbladder wall thickening or pericholecystic inflammation. Normal caliber common bile duct measuring 6 mm. No choledocholithiasis. Spleen is normal in size measuring 11 cm in craniocaudal length. There are innumerable T2 hypointense lesions throughout  the spleen which are most concerning for splenic metastasis. Kidneys enhance symmetrically. No hydronephrosis or hydroureter. No suspicious renal mass. At the mid body of the pancreas there is a T1 hypointense, T2 hypointense hypoenhancing mass measuring 2.5 x 2.1 cm concerning for pancreatic adenocarcinoma (15/42). There is dilatation of the upstream pancreatic duct at the tail with abrupt termination in the region of the mass with the duct dilated up to 7 mm. The mass encases and narrows the splenic vein without complete occlusion. There is a preserved fat plane between the mass and the superior mesenteric artery. The medial aspect of the mass abuts left lateral aspect of the superior mesenteric vein by approximately 90 degrees (11/26). No involvement of the portal vein. No portal vein or SMV thrombus. Multiple enlarged celiac axis and retroperitoneal lymph nodes most consistent with metastatic adenopathy for example aortocaval node measuring 1.6 x 2.6 cm (11/33). Adjacent to the left adrenal gland there is a T1 hyperintense mass which has peripheral rim enhancement measuring 3.4 x 3.1 cm which could relate to hemorrhagic necrotic node versus hemorrhagic adrenal metastasis (24/42). Normal right adrenal gland. The celiac artery is patent. Superior mesenteric artery patent. Negative for abdominal aortic aneurysm. No dilated bowel loops in the upper abdomen. Small left upper quadrant ascites. No drainable collection. There are multiple enhancing osseous lesions most consistent with osseous metastatic disease (26/52). No superimposed acute fracture.     Impression: 1. Hypoenhancing 2.5 cm mass at mid body of the pancreas highly concerning for primary pancreatic malignancy. 2. Innumerable hepatic and splenic lesions most consistent with metastatic disease. 3. Multiple enlarged retroperitoneal and celiac axis lymph nodes consistent with metastatic adenopathy. 4. Hemorrhagic 3.4 cm mass adjacent to left adrenal gland may  relate to necrotic/hemorrhagic retroperitoneal node versus left adrenal hemorrhagic metastatic lesion. 5. Innumerable osseous metastases. Electronically Signed: Mike Quinteros MD  2/8/2025 9:32 AM EST  Workstation ID: [id number]    CT Angiogram Chest    Result Date: 2/6/2025  CT ANGIOGRAM CHEST Date of Exam: 2/6/2025 9:04 PM EST Indication: Pancreatic lesions suspicious for malignancy.  R/O any cardiothoracic involvement. Comparison: CT abdomen pelvis 2/6/2025 Technique: CTA of the chest was performed before and after the uneventful intravenous administration of 75 mL Isovue-370. Reconstructed coronal and sagittal images were also obtained. In addition, a 3-D volume rendered image was created for interpretation. Automated exposure control and iterative reconstruction methods were used. Findings: Visualized soft tissues of the lower neck are without acute abnormality. There is normal contrast opacification of the aortic arch branch vasculature. The heart is mildly enlarged. Coronary artery calcification is noted. Negative for pericardial effusion  or pleural effusion. Negative for pulmonary embolus. No mediastinal or hilar adenopathy. No axillary adenopathy. Trachea and mainstem bronchi are patent. There is no consolidation or findings of pneumonia. Negative for pneumothorax. Right lower lobe 4 mm nodule abutting the pulmonary fissure likely benign intrapulmonary lymph node (7/56). Nonspecific right upper lobe 2 mm nodule (7/39). Right upper lobe anterior segment 3 mm nodule (7/40). Nodule in the medial anterior right upper lobe measures 5 mm (7/56). Tiny left lower lobe 2 mm nodule (7/60). Please see separately dictated abdominal CT for findings below the diaphragm including pancreatic mass and hepatic and splenic lesions. No aggressive osseous lesion. No acute fracture.     Impression: 1. No acute intrathoracic process. 2. No intrathoracic adenopathy or definite findings of intrathoracic metastatic disease. 3. Several  small scattered pulmonary nodules largest in anterior right upper lobe measuring 5 mm, recommend attention on follow-up. 4. Please see separate dictated abdominal CT for findings below the diaphragm. Electronically Signed: Mike Quinteros MD  2/6/2025 9:47 PM EST  Workstation ID: [id number]    CT Abdomen Pelvis With Contrast    Result Date: 2/6/2025  CT ABDOMEN PELVIS W CONTRAST Date of Exam: 2/6/2025 3:09 PM EST Indication: severe left abdominal/flank pain, wretching. Comparison: None available. Technique: Axial CT images were obtained of the abdomen and pelvis following the uneventful intravenous administration of 85 mL Isovue-300. Reconstructed coronal and sagittal images were also obtained. Automated exposure control and iterative construction methods were used. Findings: Liver: The liver is unremarkable in morphology. There are numerous indeterminate liver masses measuring up to 1.6 cm. No biliary dilation is seen. Gallbladder: Unremarkable. Pancreas: Suggestion of a 2.6 cm mass within the body of the pancreas with associated coarse calcification (series 2, image 40). There is upstream pancreatic ductal dilation with atrophy of the pancreatic tail. Head and neck of the pancreas appear grossly unremarkable with decompressed pancreatic duct. Spleen: Numerous small, indeterminate splenic hypodensities Adrenal glands: 3.3 cm mass or enlarged lymph node adjacent to the left adrenal gland. Right adrenal gland is unremarkable. Genitourinary tract: The kidneys are unremarkable. No hydronephrosis is seen. The visualized portions of the ureters and urinary bladder appear unremarkable. Pelvic organs demonstrate no acute abnormality. Gastrointestinal tract: Colonic diverticulosis is present. Hollow viscera appear otherwise unremarkable. There is no evidence of bowel obstruction. Appendix: No findings to suggest acute appendicitis. Other findings: Multiple enlarged upper abdominal lymph nodes. Enlarged aortocaval lymph node  measures 17 mm short axis (series 2, image 49). Additional prominent retroperitoneal lymph nodes are seen. Several prominent periportal and peripancreatic lymph  nodes. Bones and soft tissues: No acute or suspicious osseous or soft tissue lesion is identified. Lung bases: The visualized lung bases are clear.     Impression: 1.2.6 cm mass within the body of the pancreas with associated coarse calcification. There is upstream pancreatic ductal dilation with atrophy of the pancreatic tail. Appearance is concerning for primary pancreatic malignancy. 2.Numerous small hypoenhancing masses within the liver and spleen, concerning for metastatic lesions. 3.Upper abdominal/retroperitoneal lymphadenopathy, including a 3.3 cm mass adjacent to the left adrenal gland. 4.Recommend pancreatic protocol MRI with and without IV contrast for further characterization of these findings. Electronically Signed: Eitan Martin MD  2/6/2025 3:33 PM EST  Workstation ID: [id number]                 Plan for Follow-up of Pending Labs/Results: Dr. Negrete  Pending Labs       Order Current Status    Flow Cytometry ([facility]) Collected (02/07/25 1612)    Flow Cytometry ([facility]) Collected (02/07/25 1625)    Tissue Pathology Exam Collected (02/07/25 1612)    Tissue Pathology Exam Collected (02/07/25 1625)          Discharge Details        Discharge Medications        New Medications        Instructions Start Date   cyclobenzaprine 10 MG tablet  Commonly known as: FLEXERIL   10 mg, Oral, 3 Times Daily PRN      ondansetron ODT 4 MG disintegrating tablet  Commonly known as: ZOFRAN-ODT   4 mg, Translingual, Every 8 Hours PRN      oxyCODONE-acetaminophen 5-325 MG per tablet  Commonly known as: Percocet   1 tablet, Oral, Every 6 Hours PRN      sennosides-docusate 8.6-50 MG per tablet  Commonly known as: PERICOLACE   2 tablets, Oral, 2 Times Daily             Continue These Medications        Instructions Start Date   Calcium  Citrate-Vitamin D3 315-6.25 MG-MCG tablet tablet  Commonly known as: CITRACAL   2 tablets, Oral, Daily      cetirizine 10 MG tablet  Commonly known as: zyrTEC   10 mg, Daily      cycloSPORINE 0.05 % ophthalmic emulsion  Commonly known as: RESTASIS   2 drops, Ophthalmic, Every 12 Hours      Duavee 0.45-20 MG tablet  Generic drug: Conj Estrogens-Bazedoxifene   1 each, Oral, Daily      fluticasone 50 MCG/ACT nasal spray  Commonly known as: FLONASE   2 sprays, Daily      glucosamine-chondroitin 500-400 MG per tablet   1 tablet, Oral, Daily      levothyroxine 75 MCG tablet  Commonly known as: SYNTHROID, LEVOTHROID   75 mcg, Daily      melatonin 5 MG tablet tablet   10 mg, Oral, Nightly, Takes 1-2 tablets      montelukast 10 MG tablet  Commonly known as: SINGULAIR   10 mg, Nightly      Myrbetriq 25 MG tablet sustained-release 24 hour 24 hr tablet  Generic drug: Mirabegron ER   25 mg, Oral, Daily      polycarbophil 625 MG tablet tablet   625 mg, Oral, Daily, Takes 1-2 times daily      TURMERIC PO   553 mg, Oral, Daily               Allergies   Allergen Reactions    Hydrocodone-Acetaminophen Unknown (See Comments)         Discharge Disposition:  Home or Self Care    Diet:  Hospital:  Diet Order   Procedures    Diet: Regular/House; Fluid Consistency: Thin (IDDSI 0)       Diet Instructions       Diet: Regular/House Diet; Regular (IDDSI 7); Thin (IDDSI 0)      Discharge Diet: Regular/House Diet    Texture: Regular (IDDSI 7)    Fluid Consistency: Thin (IDDSI 0)             Activity:  Activity Instructions       Driving Restrictions      Type of Restriction: Driving    Driving Restrictions: No Driving While Taking Narcotics            Restrictions or Other Recommendations:  As above       CODE STATUS:    Code Status and Medical Interventions: CPR (Attempt to Resuscitate); Full Support   Ordered at: 02/06/25 2018     Level Of Support Discussed With:    Patient     Code Status (Patient has no pulse and is not breathing):    CPR  (Attempt to Resuscitate)     Medical Interventions (Patient has pulse or is breathing):    Full Support       Future Appointments   Date Time Provider Department Center   3/24/2025  2:10 PM Linus Baxter MD [facility]       Additional Instructions for the Follow-ups that You Need to Schedule       Call MD With Problems / Concerns   As directed      Please seek medical attention for any of the following: Difficulty breathing, chest pain, passing out, worsening abdominal pain, inability to tolerate oral intake, and/or any other concerning symptoms    Order Comments: Please seek medical attention for any of the following: Difficulty breathing, chest pain, passing out, worsening abdominal pain, inability to tolerate oral intake, and/or any other concerning symptoms         Discharge Follow-up with PCP   As directed       Currently Documented PCP:    Mohini Baeza MD    PCP Phone Number:    [phone]     Follow Up Details: Follow-up with primary care doctor in 5 to 7 days regarding this hospitalization, repeat labs (CBC, CMP)        Discharge Follow-up with Specified Provider: Follow-up with Dr. Negrete in 1 week to go over biopsy results and next steps. If you haven't heard from his clinic by Monday, Feb 10th PM, please call his clinic to schedule   As directed      To: Follow-up with Dr. Negrete in 1 week to go over biopsy results and next steps. If you haven't heard from his clinic by Monday, Feb 10th PM, please call his clinic to schedule                      Dalila Mendoza MD  02/08/25      Time Spent on Discharge:  I spent  45  minutes on this discharge activity which included: face-to-face encounter with the patient, reviewing the data in the system, coordination of the care with the nursing staff as well as consultants, documentation, and entering orders.

## 2025-02-08 NOTE — PLAN OF CARE
Goal Outcome Evaluation:        Problem: Adult Inpatient Plan of Care  Goal: Absence of Hospital-Acquired Illness or Injury  Intervention: Identify and Manage Fall Risk  Recent Flowsheet Documentation  Taken 2/8/2025 0411 by Jorden العراقي RN  Safety Promotion/Fall Prevention: safety round/check completed  Taken 2/8/2025 0215 by Jorden العراقي RN  Safety Promotion/Fall Prevention:   safety round/check completed   room organization consistent   clutter free environment maintained  Taken 2/8/2025 0000 by Jorden العراقي RN  Safety Promotion/Fall Prevention: safety round/check completed  Taken 2/7/2025 2200 by Jorden العراقي RN  Safety Promotion/Fall Prevention: safety round/check completed  Taken 2/7/2025 2045 by Jorden العراقي RN  Safety Promotion/Fall Prevention:   safety round/check completed   room organization consistent   clutter free environment maintained   fall prevention program maintained     Problem: Adult Inpatient Plan of Care  Goal: Absence of Hospital-Acquired Illness or Injury  Intervention: Prevent Skin Injury  Recent Flowsheet Documentation  Taken 2/8/2025 0411 by Jorden العراقي RN  Body Position: position changed independently  Taken 2/8/2025 0215 by Jorden العراقي RN  Body Position: position changed independently  Taken 2/8/2025 0000 by Jorden العراقي RN  Body Position: position changed independently  Taken 2/7/2025 2200 by Jorden العراقي RN  Body Position: position changed independently  Taken 2/7/2025 2045 by Jorden العراقي RN  Body Position: position changed independently     Problem: Adult Inpatient Plan of Care  Goal: Absence of Hospital-Acquired Illness or Injury  Intervention: Identify and Manage Fall Risk  Recent Flowsheet Documentation  Taken 2/8/2025 0411 by Jorden العراقي RN  Safety Promotion/Fall Prevention: safety round/check completed  Taken 2/8/2025 0215 by Malcom  DEEJAY Leonardo  Safety Promotion/Fall Prevention:   safety round/check completed   room organization consistent   clutter free environment maintained  Taken 2/8/2025 0000 by Jorden العراقي RN  Safety Promotion/Fall Prevention: safety round/check completed  Taken 2/7/2025 2200 by Jorden العراقي RN  Safety Promotion/Fall Prevention: safety round/check completed  Taken 2/7/2025 2045 by Jorden العراقي RN  Safety Promotion/Fall Prevention:   safety round/check completed   room organization consistent   clutter free environment maintained   fall prevention program maintained

## 2025-02-10 NOTE — PAYOR COMM NOTE
"Ref# [id number]   Discharge Summary    LUCIO Membreno, RN  Utilization Review  Phone [phone]  Fax [phone]    [place] [address]           Arely Mesa \"Izzy\" (69 y.o. Female)       Date of Birth   1955    Social Security Number       Address   [address] [place] [address]    Home Phone   [phone]    MRN   [id number]       [employer]    Marital Status                               Admission Date   2/6/25    Admission Type   Emergency    Admitting Provider   Dalila Mendoza MD    Attending Provider       Department, Room/Bed   [address], S574/1       Discharge Date   2/8/2025    Discharge Disposition   Home or Self Care    Discharge Destination                                 Attending Provider: (none)   Allergies: Hydrocodone-acetaminophen    Isolation: None   Infection: None   Code Status: Prior    Ht: 165.1 cm (65\")   Wt: 59.9 kg (132 lb)    Admission Cmt: None   Principal Problem: Pancreatic mass with metastasis to liver, spleen, bone & associated lymphadenopathy [K86.89]                   Active Insurance as of 2/6/2025       Primary Coverage       Payor Plan Insurance Group Employer/Plan Group    ANTHEM BLUE CROSS ANTHEM BLUE CROSS BLUE SHIELD PPO [id number]       Payor Plan Address Payor Plan Phone Number Payor Plan Fax Number Effective Dates    [address] [phone]  7/1/2021 - None Entered    [place] [address]         Subscriber Name Subscriber Birth Date Member ID       ARELY MESA 1955 [id number]                     Emergency Contacts        (Rel.) Home Phone Work Phone Mobile Phone    Marcelo Callahan (Son) [phone] -- --    LylaAnn (Sister) -- -- [phone]                 Discharge Summary        Dalila Mendoza MD at 02/08/25 0748              [facility] Medicine Services  DISCHARGE SUMMARY    Patient Name: " Arely Mesa  : 1955  MRN: [id number]    Date of Admission: 2025  3:33 PM  Date of Discharge: 2025  Primary Care Physician: Mohini Baeza MD    Consults       Date and Time Order Name Status Description    2025  8:41 PM Inpatient Hematology & Oncology Consult Completed             Hospital Course     Presenting Problem: Pancreatic mass with metastasis    Active Hospital Problems    Diagnosis  POA    **Pancreatic mass with metastasis to liver, spleen, bone & associated lymphadenopathy [K86.89]  Yes    Hyponatremia [E87.1]  Yes    Overactive bladder [N32.81]  Unknown    Hypothyroidism (acquired) [E03.9]  Yes      Resolved Hospital Problems   No resolved problems to display.          Hospital Course:  Arely Mesa is a 69 y.o. female with significant medical history for Ménière's disease, asthma, HLD, and hypothyroidism, who presented to [facility] for evaluation of abdominal pain.  Found to have likely metastatic pancreatic cancer.     This patient's problems and plans were partially entered by my partner and updated as appropriate by me 25.     Pancreatic mass w metastasis to liver, spleen, LAD, possibly lung  -CT reveals 2.6 cm pancreatic mass, concerning for primary pancreatic malignancy.  Numerous masses on liver and spleen.  0.3 cm mass left adrenal gland.  -CT of chest with multiple nodules, NO PE  -Discussed imaging results with patient  -CA 19-9 15  -S/p US-guided biopsy on , follow-up results outpatient  -MRI abdomen result reviewed w patient  -Heme-onc consulted, discussed with Dr. Negrete  -PRN percocet, PRN zofran, PRN flexeril on DC for patient    Hyponatremia  -Improved to 129 prior to discharge, discussed with patient regarding good p.o. 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 You will need to call on Monday to make this appointment.  -Follow-up with Dr. Negrete in 1 week to go over biopsy results and next steps. If you haven't heard from his clinic by Monday, Feb 10th PM, please call his clinic to schedule  --------------  Your sodium had improved to 129 prior to discharge  No driving while taking opioid pain medication  Do not mix alcohol with sedating medications  You can split the flexeril in half if needed to take 5mg three times a day as needed or you can take the full dose of flexeril 10mg three times a day as needed    Day of Discharge     HPI:   Patient with some back pain and muscle spasms.  No abdominal pain.  No current nausea.    Review of Systems  As above    Vital Signs:   Temp:  [97 °F (36.1 °C)-98.6 °F (37 °C)] 98.3 °F (36.8 °C)  Heart Rate:  [] 96  Resp:  [11-18] 18  BP: (102-137)/(73-92) 129/81  Flow (L/min) (Oxygen Therapy):  [2] 2      Physical Exam:  Constitutional: No acute distress, awake, alert, sitting up  HENT: NCAT, mucous membranes moist  Respiratory: Clear to auscultation bilaterally, respiratory effort normal   Cardiovascular: RRR, no murmurs  Gastrointestinal: Normoactive bowel sounds, soft, nontender, nondistended, biopsy site w bandage in place  Musculoskeletal: No bilateral ankle edema  Psychiatric: Appropriate affect, cooperative  Neurologic: Alert, moves all extremities, symmetric facies, speech clear  Skin: No rashes on exposed skin    Pertinent  and/or Most Recent Results     LAB RESULTS:      Lab 02/08/25  0520 02/07/25  0518 02/06/25  1837 02/06/25  1433   WBC 16.45* 11.35*  --  18.66*   HEMOGLOBIN 13.8 14.2  --  13.8   HEMATOCRIT 41.3 42.6  --  40.9   PLATELETS 256 278  --  304   NEUTROS ABS  --  8.62*  --  15.78*   IMMATURE GRANS (ABS)  --  0.05  --  0.10*    LYMPHS ABS  --  1.47  --  1.45   MONOS ABS  --  1.06*  --  1.22*   EOS ABS  --  0.10  --  0.03   MCV 89.2 89.1  --  88.7   CRP  --   --   --  <0.30   PROCALCITONIN  --   --   --  0.03   LACTATE  --   --   --  1.7   PROTIME  --   --  12.8  --    D DIMER QUANT  --   --  0.60  --          Lab 02/08/25  0941 02/08/25  0520 02/07/25  0518 02/06/25  1433   SODIUM 129* 127* 134* 132*   POTASSIUM 3.9 3.8 4.6 3.7   CHLORIDE 94* 95* 96* 94*   CO2 22.0 22.0 30.0* 27.0   ANION GAP 13.0 10.0 8.0 11.0   BUN 12 12 8 12   CREATININE 0.75 0.66 0.78 0.77   EGFR 86.3 95.1 82.3 83.6   GLUCOSE 113* 94 91 102*   CALCIUM 8.9 8.6 9.0 9.0   MAGNESIUM  --   --   --  1.7         Lab 02/08/25  0520 02/07/25  0518 02/06/25  1433   TOTAL PROTEIN 6.3 6.3 6.7   ALBUMIN 3.6 4.0 4.3   GLOBULIN 2.7 2.3 2.4   ALT (SGPT) 19 15 17   AST (SGOT) 25 25 24   BILIRUBIN 0.6 0.4 0.3   ALK PHOS 96 96 99   LIPASE  --   --  30         Lab 02/06/25  1837 02/06/25  1624 02/06/25  1433   HSTROP T  --  32* 20*   PROTIME 12.8  --   --    INR 0.95  --   --                  Brief Urine Lab Results  (Last result in the past 365 days)        Color   Clarity   Blood   Leuk Est   Nitrite   Protein   CREAT   Urine HCG        02/06/25 1444 Yellow   Clear   Negative   Trace   Negative   30 mg/dL (1+)                 Microbiology Results (last 10 days)       Procedure Component Value - Date/Time    COVID PRE-OP / PRE-PROCEDURE SCREENING ORDER (NO ISOLATION) - Swab, Nasopharynx [[id number]]  (Normal) Collected: 02/06/25 1434    Lab Status: Final result Specimen: Swab from Nasopharynx Updated: 02/06/25 1600    Narrative:      The following orders were created for panel order COVID PRE-OP / PRE-PROCEDURE SCREENING ORDER (NO ISOLATION) - Swab, Nasopharynx.  Procedure                               Abnormality         Status                     ---------                               -----------         ------                     COVID-19, FLU A/B, RSV P...[[id number]]  Normal               Final result                 Please view results for these tests on the individual orders.    COVID-19, FLU A/B, RSV PCR 1 HR TAT - Swab, Nasopharynx [[id number]]  (Normal) Collected: 02/06/25 1434    Lab Status: Final result Specimen: Swab from Nasopharynx Updated: 02/06/25 1600     COVID19 Not Detected     Influenza A PCR Not Detected     Influenza B PCR Not Detected     RSV, PCR Not Detected    Narrative:      Fact sheet for providers: https://www.fda.gov/media/216033/download    Fact sheet for patients: https://www.fda.gov/media/439162/download    Test performed by PCR.            MRI Abdomen With & Without Contrast    Result Date: 2/8/2025  MRI ABDOMEN W WO CONTRAST Date of Exam: 2/7/2025 9:04 PM EST Indication: Pancreas mass with liver and splenic metastases on CT.  Comparison: CT abdomen and pelvis 2/6/2025 Technique:  Routine multiplanar/multisequence images of the abdomen were obtained before and after the uneventful administration of 15 mL Multihance. Findings: Lower chest demonstrates normal heart size. Negative for pericardial effusion or pleural effusion. Lung bases without consolidation. There are innumerable T1 hypointense, T2 hyperintense liver lesions most concerning for hepatic metastasis. Many of these lesions measure below 1 cm. The largest lesion is within the left hepatic lobe at segment 4A near the dome measuring 1.5 x 1.4 cm (4/19). Lesions appear hypoenhancing with peripheral rim enhancement on the delayed images best appreciated at largest lesion in the left hepatic lobe near dome. Gallbladder is present. Negative for cholelithiasis. No gallbladder wall thickening or pericholecystic inflammation. Normal caliber common bile duct measuring 6 mm. No choledocholithiasis. Spleen is normal in size measuring 11 cm in craniocaudal length. There are innumerable T2 hypointense lesions throughout the spleen which are most concerning for splenic metastasis. Kidneys enhance symmetrically. No  hydronephrosis or hydroureter. No suspicious renal mass. At the mid body of the pancreas there is a T1 hypointense, T2 hypointense hypoenhancing mass measuring 2.5 x 2.1 cm concerning for pancreatic adenocarcinoma (15/42). There is dilatation of the upstream pancreatic duct at the tail with abrupt termination in the region of the mass with the duct dilated up to 7 mm. The mass encases and narrows the splenic vein without complete occlusion. There is a preserved fat plane between the mass and the superior mesenteric artery. The medial aspect of the mass abuts left lateral aspect of the superior mesenteric vein by approximately 90 degrees (11/26). No involvement of the portal vein. No portal vein or SMV thrombus. Multiple enlarged celiac axis and retroperitoneal lymph nodes most consistent with metastatic adenopathy for example aortocaval node measuring 1.6 x 2.6 cm (11/33). Adjacent to the left adrenal gland there is a T1 hyperintense mass which has peripheral rim enhancement measuring 3.4 x 3.1 cm which could relate to hemorrhagic necrotic node versus hemorrhagic adrenal metastasis (24/42). Normal right adrenal gland. The celiac artery is patent. Superior mesenteric artery patent. Negative for abdominal aortic aneurysm. No dilated bowel loops in the upper abdomen. Small left upper quadrant ascites. No drainable collection. There are multiple enhancing osseous lesions most consistent with osseous metastatic disease (26/52). No superimposed acute fracture.     Impression: 1. Hypoenhancing 2.5 cm mass at mid body of the pancreas highly concerning for primary pancreatic malignancy. 2. Innumerable hepatic and splenic lesions most consistent with metastatic disease. 3. Multiple enlarged retroperitoneal and celiac axis lymph nodes consistent with metastatic adenopathy. 4. Hemorrhagic 3.4 cm mass adjacent to left adrenal gland may relate to necrotic/hemorrhagic retroperitoneal node versus left adrenal hemorrhagic metastatic  lesion. 5. Innumerable osseous metastases. Electronically Signed: Mike Quinteros MD  2/8/2025 9:32 AM EST  Workstation ID: [id number]    CT Angiogram Chest    Result Date: 2/6/2025  CT ANGIOGRAM CHEST Date of Exam: 2/6/2025 9:04 PM EST Indication: Pancreatic lesions suspicious for malignancy.  R/O any cardiothoracic involvement. Comparison: CT abdomen pelvis 2/6/2025 Technique: CTA of the chest was performed before and after the uneventful intravenous administration of 75 mL Isovue-370. Reconstructed coronal and sagittal images were also obtained. In addition, a 3-D volume rendered image was created for interpretation. Automated exposure control and iterative reconstruction methods were used. Findings: Visualized soft tissues of the lower neck are without acute abnormality. There is normal contrast opacification of the aortic arch branch vasculature. The heart is mildly enlarged. Coronary artery calcification is noted. Negative for pericardial effusion  or pleural effusion. Negative for pulmonary embolus. No mediastinal or hilar adenopathy. No axillary adenopathy. Trachea and mainstem bronchi are patent. There is no consolidation or findings of pneumonia. Negative for pneumothorax. Right lower lobe 4 mm nodule abutting the pulmonary fissure likely benign intrapulmonary lymph node (7/56). Nonspecific right upper lobe 2 mm nodule (7/39). Right upper lobe anterior segment 3 mm nodule (7/40). Nodule in the medial anterior right upper lobe measures 5 mm (7/56). Tiny left lower lobe 2 mm nodule (7/60). Please see separately dictated abdominal CT for findings below the diaphragm including pancreatic mass and hepatic and splenic lesions. No aggressive osseous lesion. No acute fracture.     Impression: 1. No acute intrathoracic process. 2. No intrathoracic adenopathy or definite findings of intrathoracic metastatic disease. 3. Several small scattered pulmonary nodules largest in anterior right upper lobe measuring 5 mm,  recommend attention on follow-up. 4. Please see separate dictated abdominal CT for findings below the diaphragm. Electronically Signed: Mike Quinteros MD  2/6/2025 9:47 PM EST  Workstation ID: [id number]    CT Abdomen Pelvis With Contrast    Result Date: 2/6/2025  CT ABDOMEN PELVIS W CONTRAST Date of Exam: 2/6/2025 3:09 PM EST Indication: severe left abdominal/flank pain, wretching. Comparison: None available. Technique: Axial CT images were obtained of the abdomen and pelvis following the uneventful intravenous administration of 85 mL Isovue-300. Reconstructed coronal and sagittal images were also obtained. Automated exposure control and iterative construction methods were used. Findings: Liver: The liver is unremarkable in morphology. There are numerous indeterminate liver masses measuring up to 1.6 cm. No biliary dilation is seen. Gallbladder: Unremarkable. Pancreas: Suggestion of a 2.6 cm mass within the body of the pancreas with associated coarse calcification (series 2, image 40). There is upstream pancreatic ductal dilation with atrophy of the pancreatic tail. Head and neck of the pancreas appear grossly unremarkable with decompressed pancreatic duct. Spleen: Numerous small, indeterminate splenic hypodensities Adrenal glands: 3.3 cm mass or enlarged lymph node adjacent to the left adrenal gland. Right adrenal gland is unremarkable. Genitourinary tract: The kidneys are unremarkable. No hydronephrosis is seen. The visualized portions of the ureters and urinary bladder appear unremarkable. Pelvic organs demonstrate no acute abnormality. Gastrointestinal tract: Colonic diverticulosis is present. Hollow viscera appear otherwise unremarkable. There is no evidence of bowel obstruction. Appendix: No findings to suggest acute appendicitis. Other findings: Multiple enlarged upper abdominal lymph nodes. Enlarged aortocaval lymph node measures 17 mm short axis (series 2, image 49). Additional prominent retroperitoneal lymph  nodes are seen. Several prominent periportal and peripancreatic lymph  nodes. Bones and soft tissues: No acute or suspicious osseous or soft tissue lesion is identified. Lung bases: The visualized lung bases are clear.     Impression: 1.2.6 cm mass within the body of the pancreas with associated coarse calcification. There is upstream pancreatic ductal dilation with atrophy of the pancreatic tail. Appearance is concerning for primary pancreatic malignancy. 2.Numerous small hypoenhancing masses within the liver and spleen, concerning for metastatic lesions. 3.Upper abdominal/retroperitoneal lymphadenopathy, including a 3.3 cm mass adjacent to the left adrenal gland. 4.Recommend pancreatic protocol MRI with and without IV contrast for further characterization of these findings. Electronically Signed: Eitan Martin MD  2/6/2025 3:33 PM EST  Workstation ID: [id number]                 Plan for Follow-up of Pending Labs/Results: Dr. Negrete  Pending Labs       Order Current Status    Flow Cytometry ([facility]) Collected (02/07/25 1612)    Flow Cytometry ([facility]) Collected (02/07/25 1625)    Tissue Pathology Exam Collected (02/07/25 1612)    Tissue Pathology Exam Collected (02/07/25 1625)          Discharge Details        Discharge Medications        New Medications        Instructions Start Date   cyclobenzaprine 10 MG tablet  Commonly known as: FLEXERIL   10 mg, Oral, 3 Times Daily PRN      ondansetron ODT 4 MG disintegrating tablet  Commonly known as: ZOFRAN-ODT   4 mg, Translingual, Every 8 Hours PRN      oxyCODONE-acetaminophen 5-325 MG per tablet  Commonly known as: Percocet   1 tablet, Oral, Every 6 Hours PRN      sennosides-docusate 8.6-50 MG per tablet  Commonly known as: PERICOLACE   2 tablets, Oral, 2 Times Daily             Continue These Medications        Instructions Start Date   Calcium Citrate-Vitamin D3 315-6.25 MG-MCG tablet tablet  Commonly known as: CITRACAL   2 tablets, Oral,  Daily      cetirizine 10 MG tablet  Commonly known as: zyrTEC   10 mg, Daily      cycloSPORINE 0.05 % ophthalmic emulsion  Commonly known as: RESTASIS   2 drops, Ophthalmic, Every 12 Hours      Duavee 0.45-20 MG tablet  Generic drug: Conj Estrogens-Bazedoxifene   1 each, Oral, Daily      fluticasone 50 MCG/ACT nasal spray  Commonly known as: FLONASE   2 sprays, Daily      glucosamine-chondroitin 500-400 MG per tablet   1 tablet, Oral, Daily      levothyroxine 75 MCG tablet  Commonly known as: SYNTHROID, LEVOTHROID   75 mcg, Daily      melatonin 5 MG tablet tablet   10 mg, Oral, Nightly, Takes 1-2 tablets      montelukast 10 MG tablet  Commonly known as: SINGULAIR   10 mg, Nightly      Myrbetriq 25 MG tablet sustained-release 24 hour 24 hr tablet  Generic drug: Mirabegron ER   25 mg, Oral, Daily      polycarbophil 625 MG tablet tablet   625 mg, Oral, Daily, Takes 1-2 times daily      TURMERIC PO   553 mg, Oral, Daily               Allergies   Allergen Reactions    Hydrocodone-Acetaminophen Unknown (See Comments)         Discharge Disposition:  Home or Self Care    Diet:  Hospital:  Diet Order   Procedures    Diet: Regular/House; Fluid Consistency: Thin (IDDSI 0)       Diet Instructions       Diet: Regular/House Diet; Regular (IDDSI 7); Thin (IDDSI 0)      Discharge Diet: Regular/House Diet    Texture: Regular (IDDSI 7)    Fluid Consistency: Thin (IDDSI 0)             Activity:  Activity Instructions       Driving Restrictions      Type of Restriction: Driving    Driving Restrictions: No Driving While Taking Narcotics            Restrictions or Other Recommendations:  As above       CODE STATUS:    Code Status and Medical Interventions: CPR (Attempt to Resuscitate); Full Support   Ordered at: 02/06/25 2018     Level Of Support Discussed With:    Patient     Code Status (Patient has no pulse and is not breathing):    CPR (Attempt to Resuscitate)     Medical Interventions (Patient has pulse or is breathing):    Full  Support       Future Appointments   Date Time Provider Department Center   3/24/2025  2:10 PM Linus Baxter MD [facility]       Additional Instructions for the Follow-ups that You Need to Schedule       Call MD With Problems / Concerns   As directed      Please seek medical attention for any of the following: Difficulty breathing, chest pain, passing out, worsening abdominal pain, inability to tolerate oral intake, and/or any other concerning symptoms    Order Comments: Please seek medical attention for any of the following: Difficulty breathing, chest pain, passing out, worsening abdominal pain, inability to tolerate oral intake, and/or any other concerning symptoms         Discharge Follow-up with PCP   As directed       Currently Documented PCP:    Mohini Baeza MD    PCP Phone Number:    [phone]     Follow Up Details: Follow-up with primary care doctor in 5 to 7 days regarding this hospitalization, repeat labs (CBC, CMP)        Discharge Follow-up with Specified Provider: Follow-up with Dr. Negrete in 1 week to go over biopsy results and next steps. If you haven't heard from his clinic by Monday, Feb 10th PM, please call his clinic to schedule   As directed      To: Follow-up with Dr. Negrete in 1 week to go over biopsy results and next steps. If you haven't heard from his clinic by Monday, Feb 10th PM, please call his clinic to schedule                      Dalila Mendoza MD  02/08/25      Time Spent on Discharge:  I spent  45  minutes on this discharge activity which included: face-to-face encounter with the patient, reviewing the data in the system, coordination of the care with the nursing staff as well as consultants, documentation, and entering orders.            Electronically signed by Dalila Mendoza MD at 02/08/25 1027       Discharge Order (From admission, onward)       Start     Ordered    02/08/25 1018  Discharge patient  Once        Expected Discharge Date: 02/08/25   Discharge  Disposition: Home or Self Care   Physician of Record for Attribution - Please select from Treatment Team: JAYDEN WEEMS [[id number]]   Review needed by CMO to determine Physician of Record: No      Question Answer Comment   Physician of Record for Attribution - Please select from Treatment Team JAYDEN WEEMS    Review needed by CMO to determine Physician of Record No        02/08/25 1021

## 2025-02-13 LAB
CYTO UR: NORMAL
LAB AP CASE REPORT: NORMAL
LAB AP CLINICAL INFORMATION: NORMAL
LAB AP DIAGNOSIS COMMENT: NORMAL
PATH REPORT.ADDENDUM SPEC: NORMAL
PATH REPORT.FINAL DX SPEC: NORMAL
PATH REPORT.GROSS SPEC: NORMAL

## 2025-02-14 ENCOUNTER — PATIENT OUTREACH (OUTPATIENT)
Dept: OTHER | Facility: HOSPITAL | Age: 70
End: 2025-02-14
Payer: COMMERCIAL

## 2025-02-14 ENCOUNTER — OFFICE VISIT (OUTPATIENT)
Dept: ONCOLOGY | Facility: CLINIC | Age: 70
End: 2025-02-14
Payer: COMMERCIAL

## 2025-02-14 ENCOUNTER — LAB (OUTPATIENT)
Dept: LAB | Facility: HOSPITAL | Age: 70
End: 2025-02-14
Payer: COMMERCIAL

## 2025-02-14 VITALS
OXYGEN SATURATION: 100 % | BODY MASS INDEX: 21.83 KG/M2 | SYSTOLIC BLOOD PRESSURE: 142 MMHG | DIASTOLIC BLOOD PRESSURE: 78 MMHG | TEMPERATURE: 97.1 F | HEART RATE: 67 BPM | HEIGHT: 65 IN | RESPIRATION RATE: 16 BRPM | WEIGHT: 131 LBS

## 2025-02-14 DIAGNOSIS — K86.89 PANCREATIC MASS: Primary | ICD-10-CM

## 2025-02-14 DIAGNOSIS — Z51.11 ENCOUNTER FOR ANTINEOPLASTIC CHEMOTHERAPY: ICD-10-CM

## 2025-02-14 DIAGNOSIS — K86.89 PANCREATIC MASS: ICD-10-CM

## 2025-02-14 LAB
LDH SERPL-CCNC: 189 U/L (ref 135–214)
URATE SERPL-MCNC: 3.5 MG/DL (ref 2.4–5.7)

## 2025-02-14 PROCEDURE — 83615 LACTATE (LD) (LDH) ENZYME: CPT

## 2025-02-14 PROCEDURE — 84550 ASSAY OF BLOOD/URIC ACID: CPT

## 2025-02-14 PROCEDURE — 36415 COLL VENOUS BLD VENIPUNCTURE: CPT

## 2025-02-14 NOTE — PROGRESS NOTES
"CHIEF COMPLAINT: No somatic c/o          HISTORY OF PRESENT ILLNESS:  The patient is a 69 y.o. female, here for follow up on management of Pancreatic mass    Past Medical History:   Diagnosis Date    Asthma     Fibroids     Genital HSV     Hypercholesteremia     Off meds ~     Hypothyroid 2003    Meniere disease, right along with tinnitus     Tubular adenoma of colon 2015    f/u scope 2018 normal    Tubular adenoma of colon 2023     Past Surgical History:   Procedure Laterality Date     SECTION  1985    ORIF PATELLA FRACTURE Left 2018    RHINOPLASTY  1990    TONSILLECTOMY  1964    TRIGGER FINGER RELEASE Left 2013    index finger       Allergies   Allergen Reactions    Hydrocodone-Acetaminophen Unknown (See Comments)       Family History and Social History reviewed and changed as necessary    REVIEW OF SYSTEM:   No c/o    PHYSICAL EXAM:  No palpable cerv/ax/ing GARTH.  No SM.  Abd non tender.  Negative waldeyer's    Vitals:    25 1114   BP: 142/78   Pulse: 67   Resp: 16   Temp: 97.1 °F (36.2 °C)   SpO2: 100%   Weight: 59.4 kg (131 lb)   Height: 165.1 cm (65\")     Vitals:    25 1114   PainSc: 0-No pain          ECOG score: 0           Vitals reviewed.      Lab Results   Component Value Date    HGB 12.8 2025    HCT 39.5 2025    MCV 91 2025     2025    WBC 7.52 2025    NEUTROABS 5.2 2025    LYMPHSABS 1.25 2025    MONOSABS 0.78 2025    EOSABS 0.21 2025    BASOSABS 0.05 2025       Lab Results   Component Value Date    GLUCOSE 113 (H) 2025    BUN 12 2025    CREATININE 0.75 2025     (L) 2025    K 3.9 2025    CL 94 (L) 2025    CO2 22.0 2025    CALCIUM 8.9 2025    PROTEINTOT 6.3 2025    ALBUMIN 3.6 2025    BILITOT 0.6 2025    ALKPHOS 96 2025    AST 25 2025    ALT 19 2025             ASSESSMENT & " PLAN:  Pancreatic mass:  Patient recently inpatient with abd pain. CT angio chest showed scattered small lung nodules. CT Abd Pelvis and MRI pancreas protocol showed 2.6 cm mass within the body of the pancreas with associated coarse calcification. There is upstream pancreatic ductal dilation with atrophy of the pancreatic tail. Appearance is concerning for primary pancreatic malignancy. Numerous small hypoenhancing masses within the liver and spleen, concerning for metastatic lesions.  Upper abdominal/retroperitoneal lymphadenopathy, including a 3.3 cm mass adjacent to the left adrenal gland. Liver biopsy non diagnostic.  Ca19-9 and cmp and cbc unremarkable.     -2/14/25 [facility] followup:  Here to go over the above inpatient workup.  Need definitive answer from tissue.  Reviewed with Dr. Whittington re imaging sites to go after.  Thinks the L3 level aortocaval node would be high yield.  If can't get that then get spleen bx.  Thought the yield and safety from that greater than from EUS bx of pancreas/ node.  Could be Pratik ag negative pancreas cancer (hence neg Ca19-9) or PNET or.....I think this may be lymphoma.  Will get bx as per Dr. Paul, PET, Echo, LDH, uric acid and see her back.        Total time reviewing complex decision tree with patient 1 hour today.    Sam Negrete MD    02/14/2025

## 2025-02-14 NOTE — LETTER
"2025     Mohini Baeza MD  [address] [id number]    Patient: Arely Mesa   YOB: 1955   Date of Visit: 2025     Dear Mohini Baeza MD:       Thank you for referring Arely Mesa to me for evaluation. Below are the relevant portions of my assessment and plan of care.    If you have questions, please do not hesitate to call me. I look forward to following Arely along with you.         Sincerely,        Sam Negrete MD        CC: MD Sofi Michel MD Hicks, Lee G, MD  25 1217  Sign when Signing Visit  CHIEF COMPLAINT: No somatic c/o          HISTORY OF PRESENT ILLNESS:  The patient is a 69 y.o. female, here for follow up on management of Pancreatic mass    Past Medical History:   Diagnosis Date   • Asthma    • Fibroids    • Genital HSV    • Hypercholesteremia     Off meds ~    • Hypothyroid    • Meniere disease, right along with tinnitus    • Tubular adenoma of colon 2015    f/u scope 2018 normal   • Tubular adenoma of colon 2023     Past Surgical History:   Procedure Laterality Date   •  SECTION  1985   • ORIF PATELLA FRACTURE Left 2018   • RHINOPLASTY  1990   • TONSILLECTOMY  1964   • TRIGGER FINGER RELEASE Left 2013    index finger       Allergies   Allergen Reactions   • Hydrocodone-Acetaminophen Unknown (See Comments)       Family History and Social History reviewed and changed as necessary    REVIEW OF SYSTEM:   No c/o    PHYSICAL EXAM:  No palpable cerv/ax/ing GARTH.  No SM.  Abd non tender.  Negative waldeyer's    Vitals:    25 1114   BP: 142/78   Pulse: 67   Resp: 16   Temp: 97.1 °F (36.2 °C)   SpO2: 100%   Weight: 59.4 kg (131 lb)   Height: 165.1 cm (65\")     Vitals:    25 1114   PainSc: 0-No pain          ECOG score: 0           Vitals reviewed.      Lab Results   Component Value Date    HGB 12.8 2025    HCT 39.5 2025    MCV 91 " 02/12/2025     02/12/2025    WBC 7.52 02/12/2025    NEUTROABS 5.2 02/12/2025    LYMPHSABS 1.25 02/12/2025    MONOSABS 0.78 02/12/2025    EOSABS 0.21 02/12/2025    BASOSABS 0.05 02/12/2025       Lab Results   Component Value Date    GLUCOSE 113 (H) 02/08/2025    BUN 12 02/08/2025    CREATININE 0.75 02/08/2025     (L) 02/08/2025    K 3.9 02/08/2025    CL 94 (L) 02/08/2025    CO2 22.0 02/08/2025    CALCIUM 8.9 02/08/2025    PROTEINTOT 6.3 02/08/2025    ALBUMIN 3.6 02/08/2025    BILITOT 0.6 02/08/2025    ALKPHOS 96 02/08/2025    AST 25 02/08/2025    ALT 19 02/08/2025             ASSESSMENT & PLAN:  Pancreatic mass:  Patient recently inpatient with abd pain. CT angio chest showed scattered small lung nodules. CT Abd Pelvis and MRI pancreas protocol showed 2.6 cm mass within the body of the pancreas with associated coarse calcification. There is upstream pancreatic ductal dilation with atrophy of the pancreatic tail. Appearance is concerning for primary pancreatic malignancy. Numerous small hypoenhancing masses within the liver and spleen, concerning for metastatic lesions.  Upper abdominal/retroperitoneal lymphadenopathy, including a 3.3 cm mass adjacent to the left adrenal gland. Liver biopsy non diagnostic.  Ca19-9 and cmp and cbc unremarkable.     -2/14/25 [facility] followup:  Here to go over the above inpatient workup.  Need definitive answer from tissue.  Reviewed with Dr. Whittington re imaging sites to go after.  Thinks the L3 level aortocaval node would be high yield.  If can't get that then get spleen bx.  Thought the yield and safety from that greater than from EUS bx of pancreas/ node.  Could be Pratik ag negative pancreas cancer (hence neg Ca19-9) or PNET or.....I think this may be lymphoma.  Will get bx as per Dr. Paul, PET, Echo, LDH, uric acid and see her back.        Total time reviewing complex decision tree with patient 1 hour today.    Sam Negrete MD    02/14/2025

## 2025-02-19 ENCOUNTER — TELEPHONE (OUTPATIENT)
Dept: INFUSION THERAPY | Facility: HOSPITAL | Age: 70
End: 2025-02-19
Payer: COMMERCIAL

## 2025-02-19 ENCOUNTER — HOSPITAL ENCOUNTER (OUTPATIENT)
Dept: CARDIOLOGY | Facility: HOSPITAL | Age: 70
Discharge: HOME | End: 2025-02-19
Admitting: INTERNAL MEDICINE
Payer: COMMERCIAL

## 2025-02-19 VITALS — WEIGHT: 130.07 LBS | HEIGHT: 65 IN | BODY MASS INDEX: 21.67 KG/M2

## 2025-02-19 DIAGNOSIS — Z51.11 ENCOUNTER FOR ANTINEOPLASTIC CHEMOTHERAPY: ICD-10-CM

## 2025-02-19 DIAGNOSIS — K86.89 PANCREATIC MASS: ICD-10-CM

## 2025-02-19 LAB
ASCENDING AORTA: 2.9 CM
AV MEAN PRESS GRAD SYS DOP V1V2: 3 MMHG
AV VMAX SYS DOP: 123 CM/SEC
BH CV ECHO LEFT VENTRICLE GLOBAL LONGITUDINAL STRAIN: -17 %
BH CV ECHO MEAS - AO MAX PG: 6.1 MMHG
BH CV ECHO MEAS - AO ROOT DIAM: 2.6 CM
BH CV ECHO MEAS - AO V2 VTI: 24.6 CM
BH CV ECHO MEAS - AVA(I,D): 2.7 CM2
BH CV ECHO MEAS - EDV(CUBED): 85.2 ML
BH CV ECHO MEAS - EDV(MOD-SP2): 32.6 ML
BH CV ECHO MEAS - EDV(MOD-SP4): 55.3 ML
BH CV ECHO MEAS - EF(MOD-SP2): 56.7 %
BH CV ECHO MEAS - EF(MOD-SP4): 63.3 %
BH CV ECHO MEAS - ESV(CUBED): 22 ML
BH CV ECHO MEAS - ESV(MOD-SP2): 14.1 ML
BH CV ECHO MEAS - ESV(MOD-SP4): 20.3 ML
BH CV ECHO MEAS - FS: 36.4 %
BH CV ECHO MEAS - IVS/LVPW: 1 CM
BH CV ECHO MEAS - IVSD: 0.8 CM
BH CV ECHO MEAS - LA DIMENSION: 2.9 CM
BH CV ECHO MEAS - LAT PEAK E' VEL: 7.9 CM/SEC
BH CV ECHO MEAS - LV DIASTOLIC VOL/BSA (35-75): 33.5 CM2
BH CV ECHO MEAS - LV MASS(C)D: 109.4 GRAMS
BH CV ECHO MEAS - LV MAX PG: 5 MMHG
BH CV ECHO MEAS - LV MEAN PG: 2.33 MMHG
BH CV ECHO MEAS - LV SYSTOLIC VOL/BSA (12-30): 12.3 CM2
BH CV ECHO MEAS - LV V1 MAX: 111.3 CM/SEC
BH CV ECHO MEAS - LV V1 VTI: 21.4 CM
BH CV ECHO MEAS - LVIDD: 4.4 CM
BH CV ECHO MEAS - LVIDS: 2.8 CM
BH CV ECHO MEAS - LVOT AREA: 3.1 CM2
BH CV ECHO MEAS - LVOT DIAM: 2 CM
BH CV ECHO MEAS - LVPWD: 0.8 CM
BH CV ECHO MEAS - MED PEAK E' VEL: 6 CM/SEC
BH CV ECHO MEAS - MV A MAX VEL: 67.6 CM/SEC
BH CV ECHO MEAS - MV DEC SLOPE: 424 CM/SEC2
BH CV ECHO MEAS - MV DEC TIME: 0.21 SEC
BH CV ECHO MEAS - MV E MAX VEL: 56.1 CM/SEC
BH CV ECHO MEAS - MV E/A: 0.83
BH CV ECHO MEAS - MV MAX PG: 3.5 MMHG
BH CV ECHO MEAS - MV MEAN PG: 1 MMHG
BH CV ECHO MEAS - MV P1/2T: 57.6 MSEC
BH CV ECHO MEAS - MV V2 VTI: 20.7 CM
BH CV ECHO MEAS - MVA(P1/2T): 3.8 CM2
BH CV ECHO MEAS - MVA(VTI): 3.2 CM2
BH CV ECHO MEAS - PA ACC TIME: 0.16 SEC
BH CV ECHO MEAS - SV(LVOT): 67.2 ML
BH CV ECHO MEAS - SV(MOD-SP2): 18.5 ML
BH CV ECHO MEAS - SV(MOD-SP4): 35 ML
BH CV ECHO MEAS - SVI(LVOT): 40.7 ML/M2
BH CV ECHO MEAS - SVI(MOD-SP2): 11.2 ML/M2
BH CV ECHO MEAS - SVI(MOD-SP4): 21.2 ML/M2
BH CV ECHO MEAS - TAPSE (>1.6): 1.72 CM
BH CV ECHO MEASUREMENTS AVERAGE E/E' RATIO: 8.07
BH CV VAS BP RIGHT ARM: NORMAL MMHG
BH CV XLRA - RV BASE: 2.4 CM
BH CV XLRA - RV LENGTH: 5.9 CM
BH CV XLRA - RV MID: 1.9 CM
BH CV XLRA - TDI S': 9.8 CM/SEC
IVRT: 120 MS
LEFT ATRIUM VOLUME INDEX: 19.4 ML/M2
LV EF BIPLANE MOD: 63.7 %

## 2025-02-19 PROCEDURE — 93306 TTE W/DOPPLER COMPLETE: CPT

## 2025-02-19 NOTE — TELEPHONE ENCOUNTER
Patient contacted as pre-procedure phone call prior to planned lymph node biopsy for 2/21/25. Reviewed with patient arrival time, location, nothing to eat or drink by mouth, okay to take blood pressure medications morning of procedure with a small sip of water,  needed, reviewed procedure instructions and allowed time for questions, and reviewed home medications, allergies, and medical history.

## 2025-02-20 ENCOUNTER — HOSPITAL ENCOUNTER (OUTPATIENT)
Facility: HOSPITAL | Age: 70
Discharge: HOME | End: 2025-02-20
Payer: COMMERCIAL

## 2025-02-20 ENCOUNTER — PREP FOR SURGERY (OUTPATIENT)
Dept: OTHER | Facility: HOSPITAL | Age: 70
End: 2025-02-20
Payer: COMMERCIAL

## 2025-02-20 DIAGNOSIS — K86.89 PANCREATIC MASS: ICD-10-CM

## 2025-02-20 LAB — GLUCOSE BLDC GLUCOMTR-MCNC: 94 MG/DL (ref 70–130)

## 2025-02-20 PROCEDURE — 82948 REAGENT STRIP/BLOOD GLUCOSE: CPT

## 2025-02-20 PROCEDURE — A9552 F18 FDG: HCPCS | Performed by: INTERNAL MEDICINE

## 2025-02-20 PROCEDURE — 34310000005 FLUDEOXYGLUCOSE F18 SOLUTION: Performed by: INTERNAL MEDICINE

## 2025-02-20 PROCEDURE — 78815 PET IMAGE W/CT SKULL-THIGH: CPT

## 2025-02-20 RX ORDER — SODIUM CHLORIDE 0.9 % (FLUSH) 0.9 %
10 SYRINGE (ML) INJECTION AS NEEDED
Status: CANCELLED | OUTPATIENT
Start: 2025-02-20

## 2025-02-20 RX ADMIN — FLUDEOXYGLUCOSE F18 1 DOSE: 300 INJECTION INTRAVENOUS at 11:06

## 2025-02-21 ENCOUNTER — HOSPITAL ENCOUNTER (OUTPATIENT)
Dept: CT IMAGING | Facility: HOSPITAL | Age: 70
Discharge: HOME | End: 2025-02-21
Payer: COMMERCIAL

## 2025-02-21 VITALS
WEIGHT: 127 LBS | HEART RATE: 86 BPM | SYSTOLIC BLOOD PRESSURE: 125 MMHG | DIASTOLIC BLOOD PRESSURE: 85 MMHG | OXYGEN SATURATION: 97 % | BODY MASS INDEX: 21.16 KG/M2 | HEIGHT: 65 IN | TEMPERATURE: 96.9 F | RESPIRATION RATE: 14 BRPM

## 2025-02-21 DIAGNOSIS — R94.8 ABNORMAL POSITRON EMISSION TOMOGRAPHY (PET) SCAN: ICD-10-CM

## 2025-02-21 DIAGNOSIS — K86.89 PANCREATIC MASS: Primary | ICD-10-CM

## 2025-02-21 DIAGNOSIS — K86.89 PANCREATIC MASS: ICD-10-CM

## 2025-02-21 LAB
BASOPHILS # BLD AUTO: 0.05 10*3/MM3 (ref 0–0.2)
BASOPHILS NFR BLD AUTO: 0.9 % (ref 0–1.5)
DEPRECATED RDW RBC AUTO: 40.9 FL (ref 37–54)
EOSINOPHIL # BLD AUTO: 0.12 10*3/MM3 (ref 0–0.4)
EOSINOPHIL NFR BLD AUTO: 2.2 % (ref 0.3–6.2)
ERYTHROCYTE [DISTWIDTH] IN BLOOD BY AUTOMATED COUNT: 12.6 % (ref 12.3–15.4)
HCT VFR BLD AUTO: 39.4 % (ref 34–46.6)
HGB BLD-MCNC: 13.1 G/DL (ref 12–15.9)
IMM GRANULOCYTES # BLD AUTO: 0.02 10*3/MM3 (ref 0–0.05)
IMM GRANULOCYTES NFR BLD AUTO: 0.4 % (ref 0–0.5)
INR PPP: 0.99 (ref 0.89–1.12)
LYMPHOCYTES # BLD AUTO: 1 10*3/MM3 (ref 0.7–3.1)
LYMPHOCYTES NFR BLD AUTO: 18 % (ref 19.6–45.3)
MCH RBC QN AUTO: 29.4 PG (ref 26.6–33)
MCHC RBC AUTO-ENTMCNC: 33.2 G/DL (ref 31.5–35.7)
MCV RBC AUTO: 88.3 FL (ref 79–97)
MONOCYTES # BLD AUTO: 0.58 10*3/MM3 (ref 0.1–0.9)
MONOCYTES NFR BLD AUTO: 10.5 % (ref 5–12)
NEUTROPHILS NFR BLD AUTO: 3.78 10*3/MM3 (ref 1.7–7)
NEUTROPHILS NFR BLD AUTO: 68 % (ref 42.7–76)
NRBC BLD AUTO-RTO: 0 /100 WBC (ref 0–0.2)
PLATELET # BLD AUTO: 339 10*3/MM3 (ref 140–450)
PMV BLD AUTO: 8.8 FL (ref 6–12)
PROTHROMBIN TIME: 13.2 SECONDS (ref 12.2–14.5)
RBC # BLD AUTO: 4.46 10*6/MM3 (ref 3.77–5.28)
WBC NRBC COR # BLD AUTO: 5.55 10*3/MM3 (ref 3.4–10.8)

## 2025-02-21 PROCEDURE — 25010000002 LIDOCAINE 1 % SOLUTION: Performed by: INTERNAL MEDICINE

## 2025-02-21 PROCEDURE — 85025 COMPLETE CBC W/AUTO DIFF WBC: CPT | Performed by: RADIOLOGY

## 2025-02-21 PROCEDURE — 99153 MOD SED SAME PHYS/QHP EA: CPT

## 2025-02-21 PROCEDURE — 25010000002 MIDAZOLAM PER 1 MG: Performed by: RADIOLOGY

## 2025-02-21 PROCEDURE — 85610 PROTHROMBIN TIME: CPT | Performed by: RADIOLOGY

## 2025-02-21 PROCEDURE — 99152 MOD SED SAME PHYS/QHP 5/>YRS: CPT

## 2025-02-21 PROCEDURE — 77012 CT SCAN FOR NEEDLE BIOPSY: CPT

## 2025-02-21 PROCEDURE — 25010000002 FENTANYL CITRATE (PF) 50 MCG/ML SOLUTION: Performed by: RADIOLOGY

## 2025-02-21 RX ORDER — LIDOCAINE HYDROCHLORIDE 10 MG/ML
10 INJECTION, SOLUTION INFILTRATION; PERINEURAL ONCE
Status: COMPLETED | OUTPATIENT
Start: 2025-02-21 | End: 2025-02-21

## 2025-02-21 RX ORDER — MIDAZOLAM HYDROCHLORIDE 1 MG/ML
INJECTION, SOLUTION INTRAMUSCULAR; INTRAVENOUS
Status: DISPENSED
Start: 2025-02-21 | End: 2025-02-21

## 2025-02-21 RX ORDER — SODIUM CHLORIDE 0.9 % (FLUSH) 0.9 %
10 SYRINGE (ML) INJECTION AS NEEDED
Status: DISCONTINUED | OUTPATIENT
Start: 2025-02-21 | End: 2025-02-22 | Stop reason: HOSPADM

## 2025-02-21 RX ORDER — FENTANYL CITRATE 50 UG/ML
INJECTION, SOLUTION INTRAMUSCULAR; INTRAVENOUS AS NEEDED
Status: COMPLETED | OUTPATIENT
Start: 2025-02-21 | End: 2025-02-21

## 2025-02-21 RX ORDER — MIDAZOLAM HYDROCHLORIDE 1 MG/ML
INJECTION, SOLUTION INTRAMUSCULAR; INTRAVENOUS AS NEEDED
Status: COMPLETED | OUTPATIENT
Start: 2025-02-21 | End: 2025-02-21

## 2025-02-21 RX ORDER — FENTANYL CITRATE 50 UG/ML
INJECTION, SOLUTION INTRAMUSCULAR; INTRAVENOUS
Status: DISPENSED
Start: 2025-02-21 | End: 2025-02-21

## 2025-02-21 RX ADMIN — FENTANYL CITRATE 50 MCG: 50 INJECTION, SOLUTION INTRAMUSCULAR; INTRAVENOUS at 09:54

## 2025-02-21 RX ADMIN — MIDAZOLAM HYDROCHLORIDE 1 MG: 1 INJECTION, SOLUTION INTRAMUSCULAR; INTRAVENOUS at 10:23

## 2025-02-21 RX ADMIN — FENTANYL CITRATE 100 MCG: 50 INJECTION, SOLUTION INTRAMUSCULAR; INTRAVENOUS at 09:56

## 2025-02-21 RX ADMIN — LIDOCAINE HYDROCHLORIDE 10 ML: 10 INJECTION, SOLUTION INFILTRATION; PERINEURAL at 10:54

## 2025-02-21 RX ADMIN — MIDAZOLAM HYDROCHLORIDE 1 MG: 1 INJECTION, SOLUTION INTRAMUSCULAR; INTRAVENOUS at 09:56

## 2025-02-21 RX ADMIN — MIDAZOLAM HYDROCHLORIDE 1 MG: 1 INJECTION, SOLUTION INTRAMUSCULAR; INTRAVENOUS at 09:54

## 2025-02-21 RX ADMIN — FENTANYL CITRATE 50 MCG: 50 INJECTION, SOLUTION INTRAMUSCULAR; INTRAVENOUS at 10:25

## 2025-02-21 NOTE — PRE-PROCEDURE NOTE
[facility]   Vascular Interventional Radiology  History & Physicial        Patient Name:Arely Meas    : 1955    MRN: [id number]    Primary Care Physician: Mohini Baeza MD    Referring Physician: Sam Negrete MD     Date of admission: 2025    Subjective     Reason for Consult: Para-aortic/aortocaval LN biopsy    History of Present Illness     Arely Mesa is a 69 y.o. female referred to IR as noted above.      Active Hospital Problems:  There are no active hospital problems to display for this patient.      Personal History     Past Medical History:   Diagnosis Date    Asthma     Fibroids     Genital HSV     Hypercholesteremia     Off meds ~     Hypothyroid 2003    Meniere disease, right along with tinnitus     Tubular adenoma of colon 2015    f/u scope  normal    Tubular adenoma of colon 2023       Past Surgical History:   Procedure Laterality Date     SECTION  1985    ORIF PATELLA FRACTURE Left 2018    RHINOPLASTY  1990    TONSILLECTOMY  1964    TRIGGER FINGER RELEASE Left 2013    index finger       Family History: Her family history includes Pancreatic cancer in her paternal grandmother; Uterine cancer in her mother.     Social History: She  reports that she has never smoked. She does not have any smokeless tobacco history on file. She reports current alcohol use of about 2.0 standard drinks of alcohol per week. She reports that she does not use drugs.    Home Medications:  Calcium Citrate-Vitamin D3, Conj Estrogens-Bazedoxifene, Mirabegron ER, Turmeric, cetirizine, cycloSPORINE, cyclobenzaprine, fluticasone, glucosamine-chondroitin, levothyroxine, melatonin, montelukast, ondansetron ODT, oxyCODONE-acetaminophen, polycarbophil, and sennosides-docusate    Current Medications:    sodium chloride     Allergies:  Allergies   Allergen Reactions    Hydrocodone-Acetaminophen Nausea And Vomiting       Review of Systems    IR Procedure  "pertinent significant findings are mentioned in the PMH and PSH above.    Objective     Visit Vitals  /77 (BP Location: Left arm, Patient Position: Lying)   Pulse 73   Temp 96.9 °F (36.1 °C) (Temporal)   Resp 18   Ht 165.1 cm (65\")   Wt 57.6 kg (127 lb)   LMP 02/23/2014 (Approximate)   SpO2 99%   BMI 21.13 kg/m²        Physical Exam    A&Ox3.   Able to communicate  No Apparent Distress  Average physique   CVS: VS as noted. Chart reviewed. Stable for the procedure.  Respiratory: Non labored breathing. No signs of respiratory compromise.    Result Review      I have personally reviewed the results from the time of this admission to 2/21/2025 09:26 EST and agree with these findings.  [x]  Laboratory  []  Microbiology  [x]  Radiology  []  EKG/Telemetry   []  Cardiology/Vascular   []  Pathology  []  Old records  []  Other:    Most notable findings include: As noted:    Results from last 7 days   Lab Units 02/21/25  0709   INR  0.99   WBC 10*3/mm3 5.55   HEMOGLOBIN g/dL 13.1   HEMATOCRIT % 39.4   PLATELETS 10*3/mm3 339                   Estimated Creatinine Clearance: 64.4 mL/min (by C-G formula based on SCr of 0.75 mg/dL). No results found for: \"CREATININE\"    COVID19   Date Value Ref Range Status   02/06/2025 Not Detected Not Detected - Ref. Range Final        No results found for: \"PREGTESTUR\", \"PREGSERUM\", \"HCG\", \"HCGQUANT\"     ASA SCALE ASSESSMENT (applicable ONLY if sedation planned):   1     MALLAMPATI CLASSIFICATION (applicable ONLY if sedation planned):   1    Assessment / Plan     Arely Mesa is a 69 y.o. female referred to the IR service with above problem.    Plan:   As above.    Notice: The note was created before the performance of the procedure. It might have been left in the pending status and signed off after the procedure. The time stamp on the note may be misleading.    Jimi Whittington MD   Vascular Interventional Radiology  02/21/25   9:26 AM EST     "

## 2025-02-21 NOTE — DISCHARGE INSTRUCTIONS
Avoid any strenuous activities, pulling, tugging, straining or lifting anything over 10 pounds for the next 48 HOURS.    You may remove your bandages tomorrow and shower tomorrow; but you will need to avoid tub baths or any situation where your are submersed in water for the next 4 or 5 days to avoid the risk of infection. If you have any problems or concern please contact your physician's office.     DO NOT DRIVE ON THE DAY OF YOUR PROCEDURE.    If you have any problems or concern please contact your physician's office.      SEEK MEDICAL ATTENTION FOR ANY UNCONTROLLED PAIN OR UNUSUAL BLEEDING OR BRUISING.

## 2025-02-21 NOTE — NURSING NOTE
CT guided right bone marrow (with pathology) & aortocaval lymph node biopsy performed by Dr Whittington. Samples obtained & taken to lab per CT tech. Received 3 MG Versed & 150 MCG Fentanyl for a sedation time of 44 minutes.Bedrest in supine position for 2 hours. Report called, spoke with Izzy.

## 2025-02-21 NOTE — POST-PROCEDURE NOTE
The following procedure was performed: CTG BMBx and Ao-Caval LN bx and flow    Please see corresponding Radiology report for in detail procedural information. The Radiology report will be dictated shortly, if not done so already. Please see the IR RN note for the information regarding medicines administered if any, tunde-procedural vitals and I/O information.

## 2025-02-21 NOTE — NURSING NOTE
Pt discharged from ir dept s/p bone marrow biopsy as well as biopsy of aortocaval lymph node. Pt tolerated procedure without complications and indicates readiness for discharge. Discharge instructions reviewed with patient and her family, all voice understanding. Pt transported to exit via wheelchair per nurse.

## 2025-02-24 ENCOUNTER — TELEPHONE (OUTPATIENT)
Dept: INFUSION THERAPY | Facility: HOSPITAL | Age: 70
End: 2025-02-24
Payer: COMMERCIAL

## 2025-02-26 LAB
CYTO UR: NORMAL
LAB AP CASE REPORT: NORMAL
LAB AP CLINICAL INFORMATION: NORMAL
LAB AP DIAGNOSIS COMMENT: NORMAL
PATH REPORT.FINAL DX SPEC: NORMAL
PATH REPORT.GROSS SPEC: NORMAL

## 2025-02-28 ENCOUNTER — OFFICE VISIT (OUTPATIENT)
Dept: ONCOLOGY | Facility: CLINIC | Age: 70
End: 2025-02-28
Payer: COMMERCIAL

## 2025-02-28 ENCOUNTER — LAB (OUTPATIENT)
Dept: LAB | Facility: HOSPITAL | Age: 70
End: 2025-02-28
Payer: COMMERCIAL

## 2025-02-28 ENCOUNTER — PATIENT OUTREACH (OUTPATIENT)
Dept: OTHER | Facility: HOSPITAL | Age: 70
End: 2025-02-28
Payer: COMMERCIAL

## 2025-02-28 VITALS
DIASTOLIC BLOOD PRESSURE: 76 MMHG | WEIGHT: 131 LBS | SYSTOLIC BLOOD PRESSURE: 154 MMHG | BODY MASS INDEX: 21.83 KG/M2 | OXYGEN SATURATION: 99 % | HEART RATE: 98 BPM | HEIGHT: 65 IN | RESPIRATION RATE: 16 BRPM | TEMPERATURE: 97.7 F

## 2025-02-28 DIAGNOSIS — D3A.8 PRIMARY PANCREATIC NEUROENDOCRINE TUMOR: Primary | ICD-10-CM

## 2025-02-28 DIAGNOSIS — D3A.8 PRIMARY PANCREATIC NEUROENDOCRINE TUMOR: ICD-10-CM

## 2025-02-28 PROBLEM — Z79.899 ENCOUNTER FOR LONG-TERM (CURRENT) USE OF HIGH-RISK MEDICATION: Status: ACTIVE | Noted: 2025-02-28

## 2025-02-28 LAB
CYTO UR: NORMAL
LAB AP ASPIRATE SMEAR: NORMAL
LAB AP CASE REPORT: NORMAL
LAB AP CBC AND DIFFERENTIAL: NORMAL
LAB AP CLINICAL INFORMATION: NORMAL
LAB AP CLOT SECTION: NORMAL
LAB AP CORE BIOPSY: NORMAL
LAB AP FLOW CYTOMETRY SUMMARY: NORMAL
LAB AP INTEGRATED ONCOLOGY, ADDENDUM: NORMAL
PATH REPORT.FINAL DX SPEC: NORMAL
PATH REPORT.GROSS SPEC: NORMAL

## 2025-02-28 PROCEDURE — 86316 IMMUNOASSAY TUMOR OTHER: CPT

## 2025-02-28 PROCEDURE — 36415 COLL VENOUS BLD VENIPUNCTURE: CPT

## 2025-02-28 PROCEDURE — 83519 RIA NONANTIBODY: CPT

## 2025-02-28 NOTE — LETTER
February 28, 2025     Mohini Baeza MD  [address] [facility] [address]    Patient: Arely Mesa   YOB: 1955   Date of Visit: 2/28/2025     Dear Mohini Baeza MD:       Thank you for referring Arely Mesa to me for evaluation. Below are the relevant portions of my assessment and plan of care.    If you have questions, please do not hesitate to call me. I look forward to following Arely along with you.         Sincerely,        Sam Negrete MD        CC: MD Sofi Michel MD Alyssa M Posteraro, RN  [place] MD Caden Vincent Lee G, MD  02/28/25 0850  Sign when Signing Visit  CHIEF COMPLAINT: Pancreatic neuroendocrine tumor presently without any abdominal pain and is gaining weight    Problem List:  Oncology/Hematology History Overview Note   1.  Stage IV well-differentiated pancreatic neuroendocrine tumor clinical [id number] presenting as inpatient with abdominal pain.  CT angiogram chest showed scattered small lung nodules. CT Abd Pelvis and MRI pancreas protocol showed 2.6 cm mass within the body of the pancreas with associated coarse calcification. There is upstream pancreatic ductal dilation with atrophy of the pancreatic tail. Appearance is concerning for primary pancreatic malignancy. Numerous small hypoenhancing masses within the liver and spleen, concerning for metastatic lesions.  Upper abdominal/retroperitoneal lymphadenopathy, including a 3.3 cm mass adjacent to the left adrenal gland. Liver biopsy non diagnostic.  Ca19-9 and cmp and cbc unremarkable.2/20/2025 FDG PET positive for pancreas primary with axial and proximal appendicular skeletal, splenic, hepatic, and beata metastases.  2/21/2025 para aortic node biopsy positive well-differentiated grade 1 neuroendocrine carcinoma Ki-67 2-3%     -2/14/25 [facility] outpatient followup: Here to go over the above inpatient workup. Need definitive answer from tissue. Reviewed with   Pk re imaging sites to go after. Thinks the L3 level aortocaval node would be high yield. If can't get that then get spleen bx. Thought the yield and safety from that greater than from EUS bx of pancreas/ node. Could be Pratik ag negative pancreas cancer (hence neg Ca19-9) or PNET or.....I think this may be lymphoma. Will get bx as per Dr. Paul, PET, Echo, LDH, uric acid and see her back.   -2/14/2025 uric acid and LDH normal   -2/19/2025 ejection fraction 63.7% GLS -17%  -2/20/2025 PET shows hypermetabolic pancreas primary with lesions throughout the axial and proximal appendicular skeleton, numerous splenic and hepatic lesions, and beata metastases.  -2/21/2025 CT needle biopsy para-aortic node and bone marrow biopsy.Bone marrow pathology unremarkable.  Lymph node biopsy well-differentiated grade 1 neuroendocrine carcinoma Ki-67 2-3%.    -2/28/2025 [facility] medical oncology follow-up: The patient has pancreatic neuroendocrine tumor grade 1.  However, because I was sniffing out potential lymphoma, I had previously ordered FDG PET and it was unusually abnormal as outlined above for a low-grade PNET.  That has me concerned that there is discordance between the behavior of the tumor and the Ki-67/grade.  I am going to check chromogranin A and pancreatic polypeptide though there is not universal agreement on the usefulness of those for diagnosing managing her following this.  I am going to get a copper dotatate PET and plan to start Sandostatin LAR in a couple of weeks.  However, if the copper dotatate PET does not like this up like would be expected for well-differentiated neuroendocrine tumor of the pancreas, then I would wonder if we should not lead with Sandostatin but consider Capecitabine Temodar or intermediate to that a tyrosine kinase inhibitor or mTOR inhibitor.  I will communicate with Dr. Terell Floyd who is an expert in this area.  I relish his thoughts on the divination as to the true  "aggressiveness of this and how to proceed.  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 Floyd would like.  There is no ego involved here.     Primary pancreatic neuroendocrine tumor   2025 Initial Diagnosis    Primary pancreatic neuroendocrine tumor         HISTORY OF PRESENT ILLNESS:  The patient is a 69 y.o. female, here for follow up on management of pancreatic neuroendocrine tumor on para-aortic node biopsy.    Past Medical History:   Diagnosis Date   • Asthma    • Fibroids    • Genital HSV    • Hypercholesteremia     Off meds ~    • Hypothyroid    • Meniere disease, right along with tinnitus    • Tubular adenoma of colon 2015    f/u scope 2018 normal   • Tubular adenoma of colon 2023     Past Surgical History:   Procedure Laterality Date   •  SECTION  1985   • ORIF PATELLA FRACTURE Left 2018   • RHINOPLASTY  1990   • TONSILLECTOMY  1964   • TRIGGER FINGER RELEASE Left 2013    index finger       Allergies   Allergen Reactions   • Hydrocodone-Acetaminophen Nausea And Vomiting       Family History and Social History reviewed and changed as necessary    REVIEW OF SYSTEM:   No abdominal pain.  Gaining weight.    PHYSICAL EXAM:  No jaundice icterus or pallor.  No respiratory distress.    Vitals:    25 0801   BP: 154/76   Pulse: 98   Resp: 16   Temp: 97.7 °F (36.5 °C)   SpO2: 99%   Weight: 59.4 kg (131 lb)   Height: 165.1 cm (65\")     Vitals:    25 0801   PainSc: 0-No pain          ECOG score: 0           Vitals reviewed.    ECOG: (0) Fully Active - Able to Carry On All Pre-disease Performance Without Restriction    Lab Results   Component Value Date    HGB 13.1 2025    HCT 39.4 2025    MCV 88.3 2025     2025    WBC 5.55 2025    NEUTROABS 3.78 2025    " LYMPHSABS 1.00 02/21/2025    MONOSABS 0.58 02/21/2025    EOSABS 0.12 02/21/2025    BASOSABS 0.05 02/21/2025       Lab Results   Component Value Date    GLUCOSE 113 (H) 02/08/2025    BUN 12 02/08/2025    CREATININE 0.75 02/08/2025     (L) 02/08/2025    K 3.9 02/08/2025    CL 94 (L) 02/08/2025    CO2 22.0 02/08/2025    CALCIUM 8.9 02/08/2025    PROTEINTOT 6.3 02/08/2025    ALBUMIN 3.6 02/08/2025    BILITOT 0.6 02/08/2025    ALKPHOS 96 02/08/2025    AST 25 02/08/2025    ALT 19 02/08/2025             ASSESSMENT & PLAN:  1.  Pancreatic mass presenting as inpatient with abdominal pain.  CT angiogram chest showed scattered small lung nodules. CT Abd Pelvis and MRI pancreas protocol showed 2.6 cm mass within the body of the pancreas with associated coarse calcification. There is upstream pancreatic ductal dilation with atrophy of the pancreatic tail. Appearance is concerning for primary pancreatic malignancy. Numerous small hypoenhancing masses within the liver and spleen, concerning for metastatic lesions.  Upper abdominal/retroperitoneal lymphadenopathy, including a 3.3 cm mass adjacent to the left adrenal gland. Liver biopsy non diagnostic.  Ca19-9 and cmp and cbc unremarkable.     -2/14/25 [facility] outpatient followup: Here to go over the above inpatient workup. Need definitive answer from tissue. Reviewed with Dr. Whittington re imaging sites to go after. Thinks the L3 level aortocaval node would be high yield. If can't get that then get spleen bx. Thought the yield and safety from that greater than from EUS bx of pancreas/ node. Could be Pratik ag negative pancreas cancer (hence neg Ca19-9) or PNET or.....I think this may be lymphoma. Will get bx as per Dr. Paul, PET, Echo, LDH, uric acid and see her back.     -2/14/2025 uric acid and LDH normal   -2/19/2025 ejection fraction 63.7% GLS -17%  -2/20/2025 PET shows hypermetabolic pancreas primary with lesions throughout the axial and proximal appendicular  skeleton, numerous splenic and hepatic lesions, and beata metastases.  -2/21/2025 CT needle biopsy para-aortic node and bone marrow biopsy.Bone marrow pathology unremarkable.  Lymph node biopsy well-differentiated grade 1 neuroendocrine carcinoma Ki-67 2-3%.    -2/28/2025 [facility] medical oncology follow-up: The patient has pancreatic neuroendocrine tumor grade 1.  However, because I was sniffing out potential lymphoma, I had previously ordered FDG PET and it was unusually abnormal as outlined above for a low-grade PNET.  That has me concerned that there is discordance between the behavior of the tumor and the Ki-67/grade.  I am going to check chromogranin A and pancreatic polypeptide though there is not universal agreement on the usefulness of those for diagnosing managing her following this.  I am going to get a copper dotatate PET and plan to start Sandostatin LAR in a couple of weeks.  However, if the copper dotatate PET does not like this up like would be expected for well-differentiated neuroendocrine tumor of the pancreas, then I would wonder if we should not lead with Sandostatin but consider Capecitabine Temodar or intermediate to that a tyrosine kinase inhibitor or mTOR inhibitor.  I will communicate with Dr. Terell Floyd who is an expert in this area.  I relish his thoughts on the divination as to the true aggressiveness of this and how to proceed.  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 Floyd would like.  There is no ego involved here.    Total time of care today inclusive of time spent today prior to her arrival reviewing interval data and cataloging as above during visit translating that patient putting forth a plan as outlined above and after visit instituting this plan took 1 hour patient care time throughout the day today.  Sam Negrete,  MD    02/28/2025

## 2025-02-28 NOTE — LETTER
February 28, 2025     Mohini Baeza MD  [address] [facility] [address]    Patient: Arely Mesa   YOB: 1955   Date of Visit: 2/28/2025     Dear Mohini Baeza MD:       Thank you for referring Arely Mesa to me for evaluation. Below are the relevant portions of my assessment and plan of care.    If you have questions, please do not hesitate to call me. I look forward to following Arely along with you.         Sincerely,        Sam Negrete MD        CC: MD Sofi Michel MD Alyssa M Posteraro, RN  [place] MD Caden Vincent Lee G, MD  02/28/25 0760  Addendum  CHIEF COMPLAINT: Pancreatic neuroendocrine tumor presently without any abdominal pain and is gaining weight    Problem List:  Oncology/Hematology History Overview Note   1.  Stage IV well-differentiated pancreatic neuroendocrine tumor clinical [id number] presenting as inpatient with abdominal pain.  CT angiogram chest showed scattered small lung nodules. CT Abd Pelvis and MRI pancreas protocol showed 2.6 cm mass within the body of the pancreas with associated coarse calcification. There is upstream pancreatic ductal dilation with atrophy of the pancreatic tail. Appearance is concerning for primary pancreatic malignancy. Numerous small hypoenhancing masses within the liver and spleen, concerning for metastatic lesions.  Upper abdominal/retroperitoneal lymphadenopathy, including a 3.3 cm mass adjacent to the left adrenal gland. Liver biopsy non diagnostic.  Ca19-9 and cmp and cbc unremarkable.2/20/2025 FDG PET positive for pancreas primary with axial and proximal appendicular skeletal, splenic, hepatic, and beata metastases.  2/21/2025 para aortic node biopsy positive well-differentiated grade 1 neuroendocrine carcinoma Ki-67 2-3%     -2/14/25 [facility] outpatient followup: Here to go over the above inpatient workup. Need definitive answer from tissue. Reviewed with Dr. Whittington re imaging  sites to go after. Thinks the L3 level aortocaval node would be high yield. If can't get that then get spleen bx. Thought the yield and safety from that greater than from EUS bx of pancreas/ node. Could be Pratik ag negative pancreas cancer (hence neg Ca19-9) or PNET or.....I think this may be lymphoma. Will get bx as per Dr. Paul, PET, Echo, LDH, uric acid and see her back.   -2/14/2025 uric acid and LDH normal   -2/19/2025 ejection fraction 63.7% GLS -17%  -2/20/2025 PET shows hypermetabolic pancreas primary with lesions throughout the axial and proximal appendicular skeleton, numerous splenic and hepatic lesions, and beata metastases.  -2/21/2025 CT needle biopsy para-aortic node and bone marrow biopsy.Bone marrow pathology unremarkable.  Lymph node biopsy well-differentiated grade 1 neuroendocrine carcinoma Ki-67 2-3%.    -2/28/2025 [facility] medical oncology follow-up: The patient has pancreatic neuroendocrine tumor grade 1.  However, because I was sniffing out potential lymphoma, I had previously ordered FDG PET and it was unusually abnormal as outlined above for a low-grade PNET.  That has me concerned that there is discordance between the behavior of the tumor and the Ki-67/grade.  I am going to check chromogranin A and pancreatic polypeptide though there is not universal agreement on the usefulness of those for diagnosing managing her following this.  I am going to get a copper dotatate PET and plan to start Sandostatin LAR in a couple of weeks.  However, if the copper dotatate PET does not like this up like would be expected for well-differentiated neuroendocrine tumor of the pancreas, then I would wonder if we should not lead with Sandostatin but consider Capecitabine Temodar or intermediate to that a tyrosine kinase inhibitor or mTOR inhibitor.  I will communicate with Dr. Terell Floyd who is an expert in this area.  I relish his thoughts on the divination as to the true aggressiveness of this and how  "to proceed.  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 Floyd would like.  There is no ego involved here.     Primary pancreatic neuroendocrine tumor   2025 Initial Diagnosis    Primary pancreatic neuroendocrine tumor         HISTORY OF PRESENT ILLNESS:  The patient is a 69 y.o. female, here for follow up on management of pancreatic neuroendocrine tumor on para-aortic node biopsy.    Past Medical History:   Diagnosis Date   • Asthma    • Fibroids    • Genital HSV    • Hypercholesteremia     Off meds ~    • Hypothyroid    • Meniere disease, right along with tinnitus    • Tubular adenoma of colon 2015    f/u scope  normal   • Tubular adenoma of colon 2023     Past Surgical History:   Procedure Laterality Date   •  SECTION  1985   • ORIF PATELLA FRACTURE Left 2018   • RHINOPLASTY  1990   • TONSILLECTOMY  1964   • TRIGGER FINGER RELEASE Left 2013    index finger       Allergies   Allergen Reactions   • Hydrocodone-Acetaminophen Nausea And Vomiting       Family History and Social History reviewed and changed as necessary    REVIEW OF SYSTEM:   No abdominal pain.  Gaining weight.    PHYSICAL EXAM:  No jaundice icterus or pallor.  No respiratory distress.    Vitals:    25 0801   BP: 154/76   Pulse: 98   Resp: 16   Temp: 97.7 °F (36.5 °C)   SpO2: 99%   Weight: 59.4 kg (131 lb)   Height: 165.1 cm (65\")     Vitals:    25 0801   PainSc: 0-No pain          ECOG score: 0           Vitals reviewed.    ECOG: (0) Fully Active - Able to Carry On All Pre-disease Performance Without Restriction    Lab Results   Component Value Date    HGB 13.1 2025    HCT 39.4 2025    MCV 88.3 2025     2025    WBC 5.55 2025    NEUTROABS 3.78 2025    LYMPHSABS 1.00 2025    MONOSABS " 0.58 02/21/2025    EOSABS 0.12 02/21/2025    BASOSABS 0.05 02/21/2025       Lab Results   Component Value Date    GLUCOSE 113 (H) 02/08/2025    BUN 12 02/08/2025    CREATININE 0.75 02/08/2025     (L) 02/08/2025    K 3.9 02/08/2025    CL 94 (L) 02/08/2025    CO2 22.0 02/08/2025    CALCIUM 8.9 02/08/2025    PROTEINTOT 6.3 02/08/2025    ALBUMIN 3.6 02/08/2025    BILITOT 0.6 02/08/2025    ALKPHOS 96 02/08/2025    AST 25 02/08/2025    ALT 19 02/08/2025             ASSESSMENT & PLAN:  1.  Pancreatic mass presenting as inpatient with abdominal pain.  CT angiogram chest showed scattered small lung nodules. CT Abd Pelvis and MRI pancreas protocol showed 2.6 cm mass within the body of the pancreas with associated coarse calcification. There is upstream pancreatic ductal dilation with atrophy of the pancreatic tail. Appearance is concerning for primary pancreatic malignancy. Numerous small hypoenhancing masses within the liver and spleen, concerning for metastatic lesions.  Upper abdominal/retroperitoneal lymphadenopathy, including a 3.3 cm mass adjacent to the left adrenal gland. Liver biopsy non diagnostic.  Ca19-9 and cmp and cbc unremarkable.     -2/14/25 [facility] outpatient followup: Here to go over the above inpatient workup. Need definitive answer from tissue. Reviewed with Dr. Whittington re imaging sites to go after. Thinks the L3 level aortocaval node would be high yield. If can't get that then get spleen bx. Thought the yield and safety from that greater than from EUS bx of pancreas/ node. Could be Pratik ag negative pancreas cancer (hence neg Ca19-9) or PNET or.....I think this may be lymphoma. Will get bx as per Dr. Paul, PET, Echo, LDH, uric acid and see her back.     -2/14/2025 uric acid and LDH normal   -2/19/2025 ejection fraction 63.7% GLS -17%  -2/20/2025 PET shows hypermetabolic pancreas primary with lesions throughout the axial and proximal appendicular skeleton, numerous splenic and hepatic  lesions, and beata metastases.  -2/21/2025 CT needle biopsy para-aortic node and bone marrow biopsy.Bone marrow pathology unremarkable.  Lymph node biopsy well-differentiated grade 1 neuroendocrine carcinoma Ki-67 2-3%.    -2/28/2025 [facility] oncology follow-up: The patient has pancreatic neuroendocrine tumor grade 1.  However, because I was sniffing out potential lymphoma, I had previously ordered FDG PET and it was unusually abnormal as outlined above for a low-grade PNET.  That has me concerned that there is discordance between the behavior of the tumor and the Ki-67/grade.  I am going to check chromogranin A and pancreatic polypeptide though there is not universal agreement on the usefulness of those for diagnosing managing her following this.  I am going to get a copper dotatate PET and plan to start Sandostatin LAR in a couple of weeks.  However, if the copper dotatate PET does not like this up like would be expected for well-differentiated neuroendocrine tumor of the pancreas, then I would wonder if we should not lead with Sandostatin but consider Capecitabine Temodar or intermediate to that a tyrosine kinase inhibitor or mTOR inhibitor.  I will communicate with Dr. Terell Floyd who is an expert in this area.  I relish his thoughts on the divination as to the true aggressiveness of this and how to proceed.  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 Floyd would like.  There is no ego involved here.  Addendum: I spoke with Dr. Floyd.  As she is not having pain or any syndromic symptoms, if the copper dotatate PET is positive then 1 could argue for watchful waiting.  Sandostatin analogs could perhaps hold that in check but could also introduce symptoms.  Could consider giving Capecitabine Temodar for 2 or 3 months and repeating the FDG PET and if she gets  a rapid response pressed that to maximum cytoreduction and then go to either watchful waiting or maintenance Sandostatin analog.  Ultimately our decision will revolve around the results of her copper dotatate PET.  I will also add her neuron-specific enolase as if that is high that would lean us towards a more aggressive form of PNET.    Total time of care today inclusive of time spent today prior to her arrival reviewing interval data and cataloging as above during visit translating that patient putting forth a plan as outlined above and after visit instituting this plan took 1 hour patient care time throughout the day today.  Sam Negrete MD    02/28/2025

## 2025-03-03 LAB — CGA SERPL-MCNC: 272.2 NG/ML (ref 0–101.8)

## 2025-03-05 LAB — NSE SERPL IA-MCNC: 28 NG/ML (ref 0–17.6)

## 2025-03-06 DIAGNOSIS — Z79.899 ENCOUNTER FOR LONG-TERM (CURRENT) USE OF HIGH-RISK MEDICATION: Primary | ICD-10-CM

## 2025-03-06 DIAGNOSIS — D3A.8 PRIMARY PANCREATIC NEUROENDOCRINE TUMOR: ICD-10-CM

## 2025-03-06 RX ORDER — OCTREOTIDE ACETATE 30 MG
30 KIT INTRAMUSCULAR ONCE
OUTPATIENT
Start: 2025-03-15

## 2025-03-11 DIAGNOSIS — Z79.899 ENCOUNTER FOR LONG-TERM (CURRENT) USE OF HIGH-RISK MEDICATION: ICD-10-CM

## 2025-03-11 DIAGNOSIS — D3A.8 PRIMARY PANCREATIC NEUROENDOCRINE TUMOR: Primary | ICD-10-CM

## 2025-03-12 LAB — PANC POLYPEPT SERPL-MCNC: 754.6 PG/ML (ref 27.8–808)

## 2025-03-13 DIAGNOSIS — D3A.8 PRIMARY PANCREATIC NEUROENDOCRINE TUMOR: Primary | Chronic | ICD-10-CM

## 2025-03-18 DIAGNOSIS — D3A.8 PRIMARY PANCREATIC NEUROENDOCRINE TUMOR: ICD-10-CM

## 2025-03-18 DIAGNOSIS — Z79.899 ENCOUNTER FOR LONG-TERM (CURRENT) USE OF HIGH-RISK MEDICATION: Primary | ICD-10-CM

## 2025-03-19 RX ORDER — ONDANSETRON 8 MG/1
TABLET, FILM COATED ORAL
Qty: 42 TABLET | Refills: 5 | Status: SHIPPED | OUTPATIENT
Start: 2025-03-19

## 2025-03-19 RX ORDER — SULFAMETHOXAZOLE AND TRIMETHOPRIM 800; 160 MG/1; MG/1
1 TABLET ORAL 3 TIMES WEEKLY
Qty: 12 TABLET | Refills: 11 | Status: SHIPPED | OUTPATIENT
Start: 2025-03-19 | End: 2025-03-24

## 2025-03-19 RX ORDER — PROCHLORPERAZINE MALEATE 10 MG
10 TABLET ORAL EVERY 6 HOURS PRN
Qty: 30 TABLET | Refills: 5 | Status: SHIPPED | OUTPATIENT
Start: 2025-03-19 | End: 2025-03-24

## 2025-03-21 ENCOUNTER — HOSPITAL ENCOUNTER (OUTPATIENT)
Facility: HOSPITAL | Age: 70
Discharge: HOME | End: 2025-03-21
Payer: MEDICARE

## 2025-03-21 DIAGNOSIS — D3A.8 PRIMARY PANCREATIC NEUROENDOCRINE TUMOR: Chronic | ICD-10-CM

## 2025-03-21 PROCEDURE — 74177 CT ABD & PELVIS W/CONTRAST: CPT

## 2025-03-21 PROCEDURE — 71260 CT THORAX DX C+: CPT

## 2025-03-21 PROCEDURE — 25510000001 IOPAMIDOL 61 % SOLUTION: Performed by: INTERNAL MEDICINE

## 2025-03-21 RX ORDER — IOPAMIDOL 612 MG/ML
100 INJECTION, SOLUTION INTRAVASCULAR
Status: COMPLETED | OUTPATIENT
Start: 2025-03-21 | End: 2025-03-21

## 2025-03-21 RX ADMIN — IOPAMIDOL 75 ML: 612 INJECTION, SOLUTION INTRAVENOUS at 11:35

## 2025-03-22 NOTE — PROGRESS NOTES
Subjective   Chief Complaint   Patient presents with    Gynecologic Exam     Arely Mesa is a 69 y.o. year old  menopausal female presenting to be seen for her annual exam.  This past year was incidentally diagnosed with a pancreatic neuroendocrine tumor.  She ended up with some abdominal pain which prompted the workup.  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  She is being evaluated at [facility] and is about to start oral chemotherapy.  Treatment is thought to be oral chemotherapy roughly 2 weeks on then 2 weeks off for the next year.  If all goes well after a year of treatment they may change therapy to a different kind of injectable outpatient therapy to keep things in check.  There is a blood biomarker they can use to assess response to therapy.  Nocturia has largely resolved and the Myrbetriq is working great.    This past year she has been on hormone replacement therapy.  She has not had any vaginal bleeding in the last 12 months.  Menopausal symptoms are not present.    SEXUAL Hx:  She is not currently sexually active.  In the past year there she has not been sexually active.    Condoms are not needed because she is not sexually active.  She would not like to be screened for STD's at today's exam.  [place] is painful: no  HEALTH Hx:  She exercises regularly: yes.  She wears her seat belt: yes.  She has concerns about domestic violence: no.  She has noticed changes in height: no.    The following portions of the patient's history were reviewed and updated as appropriate:problem list, current medications, allergies, past family history, past medical history, past social history, and past surgical history.    Social History    Tobacco Use      Smoking status: Never      Smokeless tobacco: Not on file      Review of Systems  Constitutional POS: nothing reported    NEG: anorexia or night sweats    Genitourinary POS: see HPI    NEG: dysuria or hematuria      Gastointestinal POS: nothing reported    NEG: bloating, change in bowel habits, melena, or reflux symptoms   Integument POS: nothing reported    NEG: moles that are changing in size, shape, color or rashes   Breast POS: nothing reported    NEG: persistent breast lump, skin dimpling, or nipple discharge        Objective   /78   Resp 14   Wt 60.8 kg (134 lb)   LMP 02/23/2014 (Approximate)   BMI 22.30 kg/m²     General:  well developed; well nourished  no acute distress  mentation appropriate   Skin:  No suspicious lesions seen   Thyroid: normal to inspection and palpation   Breasts:  Examined in supine position  Symmetric without masses or skin dimpling  Nipples normal without inversion, lesions or discharge  There are no palpable axillary nodes   Abdomen: soft, non-tender; no masses  no umbilical or inguinal hernias are present  no hepato-splenomegaly   Pelvis: Clinical staff was present for exam  External genitalia:  normal appearance of the external genitalia including Barth[place]'s and [place]'s glands.  :  urethral meatus normal; urethra normal:  Vaginal:  normal pink mucosa without prolapse or lesions.  Cervix:  normal appearance.  Uterus:  normal size, shape and consistency.  Adnexa:  non palpable bilaterally.  Rectal:  digital rectal exam not performed; anus visually normal appearing.        Assessment   Normal GYN exam in menopause  Osteopenia with normal FRAX - DEXA is not yet due  Overactive bladder stable with medication  Pancreatic neuroendocrine tumor being followed by hematology/oncology  Menopausal female currently on HRT - without significant symptoms affecting activities of daily living  She is up to date on all relevant gynecologic and colorectal screenings       Plan   Pap was not done today.  I explained to Arely that the recommendations for Pap smear interval in a low risk patient has lengthened to 3 years time.  I told Arely  she still needs to be seen in our office yearly for a full physical including breast and pelvic exam.  She was encouraged to get yearly mammograms.  She should report any palpable breast lump(s) or skin changes regardless of mammographic findings.  I explained to Arely that notification regarding her mammogram results will come from the center performing the study.  Our office will not be routinely calling with mammogram results.  It is her responsibility to make sure that the results from the mammogram are communicated to her by the breast center.  If she has any questions about the results, she is welcome to call our office anytime.  I have counseled the patient on the importance of staying up to date with preventive vaccines as well as the risks and benefits of these vaccines.  Her vaccine record was reviewed and updated.  The importance of keeping all planned follow-up and taking all medications as prescribed was emphasized.  Follow up for annual exam 1 year    New Medications Ordered This Visit   Medications    Duavee 0.45-20 MG tablet     Sig: Take 1 each by mouth Daily.     Dispense:  90 tablet     Refill:  4    Mirabegron ER (Myrbetriq) 25 MG tablet sustained-release 24 hour 24 hr tablet     Sig: Take 1 tablet by mouth Daily.     Dispense:  90 tablet     Refill:  4          This note was electronically signed.    Linus Baxter M.D.  March 24, 2025    Part of this note may be an electronic transcription/translation of spoken language to printed text using the Dragon Dictation System.

## 2025-03-24 ENCOUNTER — OFFICE VISIT (OUTPATIENT)
Dept: OBSTETRICS AND GYNECOLOGY | Facility: CLINIC | Age: 70
End: 2025-03-24
Payer: MEDICARE

## 2025-03-24 ENCOUNTER — TELEMEDICINE (OUTPATIENT)
Dept: ONCOLOGY | Facility: CLINIC | Age: 70
End: 2025-03-24
Payer: MEDICARE

## 2025-03-24 VITALS
BODY MASS INDEX: 22.3 KG/M2 | RESPIRATION RATE: 14 BRPM | WEIGHT: 134 LBS | DIASTOLIC BLOOD PRESSURE: 78 MMHG | SYSTOLIC BLOOD PRESSURE: 128 MMHG

## 2025-03-24 VITALS — BODY MASS INDEX: 21.8 KG/M2 | HEIGHT: 65 IN

## 2025-03-24 DIAGNOSIS — Z01.419 WELL WOMAN EXAM WITH ROUTINE GYNECOLOGICAL EXAM: Primary | ICD-10-CM

## 2025-03-24 DIAGNOSIS — Z71.85 VACCINE COUNSELING: ICD-10-CM

## 2025-03-24 DIAGNOSIS — M85.861 OSTEOPENIA OF LOWER LEG, BILATERAL: ICD-10-CM

## 2025-03-24 DIAGNOSIS — M85.862 OSTEOPENIA OF LOWER LEG, BILATERAL: ICD-10-CM

## 2025-03-24 DIAGNOSIS — N32.81 OVERACTIVE BLADDER: ICD-10-CM

## 2025-03-24 DIAGNOSIS — Z79.899 ENCOUNTER FOR LONG-TERM (CURRENT) USE OF HIGH-RISK MEDICATION: ICD-10-CM

## 2025-03-24 DIAGNOSIS — Z79.890 HORMONE REPLACEMENT THERAPY: ICD-10-CM

## 2025-03-24 DIAGNOSIS — D3A.8 PRIMARY PANCREATIC NEUROENDOCRINE TUMOR: Primary | Chronic | ICD-10-CM

## 2025-03-24 PROBLEM — E87.1 HYPONATREMIA: Status: RESOLVED | Noted: 2025-02-08 | Resolved: 2025-03-24

## 2025-03-24 PROBLEM — R35.1 NOCTURIA: Status: RESOLVED | Noted: 2024-03-21 | Resolved: 2025-03-24

## 2025-03-24 PROCEDURE — G0101 CA SCREEN;PELVIC/BREAST EXAM: HCPCS | Performed by: OBSTETRICS & GYNECOLOGY

## 2025-03-24 PROCEDURE — 99215 OFFICE O/P EST HI 40 MIN: CPT | Performed by: INTERNAL MEDICINE

## 2025-03-24 RX ORDER — MIRABEGRON 25 MG/1
25 TABLET, FILM COATED, EXTENDED RELEASE ORAL DAILY
Qty: 90 TABLET | Refills: 4 | Status: SHIPPED | OUTPATIENT
Start: 2025-03-24

## 2025-03-24 RX ORDER — SULFAMETHOXAZOLE AND TRIMETHOPRIM 800; 160 MG/1; MG/1
1 TABLET ORAL 3 TIMES WEEKLY
Qty: 12 TABLET | Refills: 11 | Status: SHIPPED | OUTPATIENT
Start: 2025-03-24

## 2025-03-24 RX ORDER — PROCHLORPERAZINE MALEATE 10 MG
10 TABLET ORAL EVERY 6 HOURS PRN
Qty: 30 TABLET | Refills: 5 | Status: SHIPPED | OUTPATIENT
Start: 2025-03-24 | End: 2025-03-24 | Stop reason: ALTCHOICE

## 2025-03-24 RX ORDER — CONJUGATED ESTROGENS/BAZEDOXIFENE .45; 2 MG/1; MG/1
1 TABLET, FILM COATED ORAL DAILY
Qty: 90 TABLET | Refills: 4 | Status: SHIPPED | OUTPATIENT
Start: 2025-03-24

## 2025-03-24 RX ORDER — PROMETHAZINE HYDROCHLORIDE 25 MG/1
25 TABLET ORAL EVERY 8 HOURS PRN
Qty: 30 TABLET | Refills: 5 | Status: SHIPPED | OUTPATIENT
Start: 2025-03-24

## 2025-03-24 NOTE — LETTER
March 24, 2025     Mohini Baeza MD  [address] [facility] [id number]    Patient: Arely Meas   YOB: 1955   Date of Visit: 3/24/2025       Dear Mohini Baeza MD    Arely Mesa was in my office today. Below is a copy of my note.    If you have questions, please do not hesitate to call me. I look forward to following Arely along with you.         Sincerely,        Sam Negrete MD        CC: MD Sofi Michel MD Alyssa M Posteraro, DEEJAY Floyd MD    This was an audio and video enabled telemedicine encounter.  Done for COVID-19 risk reduction verbal consent given.  Patient in her home and I am in my office both in [place] during the visit.    CHIEF COMPLAINT: No new somatic complaints    Problem List:  Oncology/Hematology History Overview Note   1.  Stage IV well-differentiated pancreatic neuroendocrine tumor clinical [id number] presenting as inpatient with abdominal pain.  CT angiogram chest showed scattered small lung nodules. CT Abd Pelvis and MRI pancreas protocol showed 2.6 cm mass within the body of the pancreas with associated coarse calcification. There is upstream pancreatic ductal dilation with atrophy of the pancreatic tail. Appearance is concerning for primary pancreatic malignancy. Numerous small hypoenhancing masses within the liver and spleen, concerning for metastatic lesions.  Upper abdominal/retroperitoneal lymphadenopathy, including a 3.3 cm mass adjacent to the left adrenal gland. Liver biopsy non diagnostic.  Ca19-9 and cmp and cbc unremarkable.2/20/2025 FDG PET positive for pancreas primary with axial and proximal appendicular skeletal, splenic, hepatic, and beata metastases.  2/21/2025 para aortic node biopsy positive well-differentiated grade 1 neuroendocrine carcinoma Ki-67 2-3%     -2/14/25 [facility] outpatient followup: Here to go over the above inpatient workup. Need definitive answer from tissue. Reviewed with  Dr. Whittington re imaging sites to go after. Thinks the L3 level aortocaval node would be high yield. If can't get that then get spleen bx. Thought the yield and safety from that greater than from EUS bx of pancreas/ node. Could be Pratik ag negative pancreas cancer (hence neg Ca19-9) or PNET or.....I think this may be lymphoma. Will get bx as per Dr. Paul, PET, Echo, LDH, uric acid and see her back.   -2/14/2025 uric acid and LDH normal   -2/19/2025 ejection fraction 63.7% GLS -17%  -2/20/2025 PET shows hypermetabolic pancreas primary with lesions throughout the axial and proximal appendicular skeleton, numerous splenic and hepatic lesions, and beata metastases.  -2/21/2025 CT needle biopsy para-aortic node and bone marrow biopsy.Bone marrow pathology unremarkable.  Lymph node biopsy well-differentiated grade 1 neuroendocrine carcinoma Ki-67 2-3%.    -2/28/2025 [facility] medical oncology follow-up: The patient has pancreatic neuroendocrine tumor grade 1.  However, because I was sniffing out potential lymphoma, I had previously ordered FDG PET and it was unusually abnormal as outlined above for a low-grade PNET.  That has me concerned that there is discordance between the behavior of the tumor and the Ki-67/grade.  I am going to check chromogranin A and pancreatic polypeptide though there is not universal agreement on the usefulness of those for diagnosing managing her following this.  I am going to get a copper dotatate PET and plan to start Sandostatin LAR in a couple of weeks.  However, if the copper dotatate PET does not like this up like would be expected for well-differentiated neuroendocrine tumor of the pancreas, then I would wonder if we should not lead with Sandostatin but consider Capecitabine Temodar or intermediate to that a tyrosine kinase inhibitor or mTOR inhibitor.  I will communicate with Dr. Terell Floyd who is an expert in this area.  I relish his thoughts on the divination as to the true  aggressiveness of this and how to proceed.  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 Floyd would like.  There is no ego involved here.       Addendum: I spoke with Dr. Floyd.  As she is not having pain or any syndromic symptoms, if the copper dotatate PET is positive then 1 could argue for watchful waiting.  Sandostatin analogs could perhaps hold that in check but could also introduce symptoms.  Could consider giving Capecitabine Temodar for 2 or 3 months and repeating the FDG PET and if she gets a rapid response pressed that to maximum cytoreduction and then go to either watchful waiting or maintenance Sandostatin analog.  Ultimately our decision will revolve around the results of her copper dotatate PET.  I will also add her neuron-specific enolase as if that is high that would lean us towards a more aggressive form of PNET.     Primary pancreatic neuroendocrine tumor   2025 Initial Diagnosis    Primary pancreatic neuroendocrine tumor     3/31/2025 -  Chemotherapy    OP NEUROENDOCRINE Capecitabine / Temozolomide         HISTORY OF PRESENT ILLNESS:  The patient is a 69 y.o. female, here for follow up on management of pancreatic neuroendocrine carcinoma with a range of well-differentiated intermediate and high-grade component based on imaging with well-differentiated disease on beata biopsy.    Past Medical History:   Diagnosis Date   • Asthma    • Fibroids    • Genital HSV    • Hypercholesteremia     Off meds ~    • Hypothyroid    • Meniere disease, right along with tinnitus    • Tubular adenoma of colon 2015    f/u scope 2018 normal   • Tubular adenoma of colon 2023     Past Surgical History:   Procedure Laterality Date   •  SECTION  1985   • ORIF PATELLA FRACTURE Left 2018   • RHINOPLASTY  1990   •  "TONSILLECTOMY  01/1964   • TRIGGER FINGER RELEASE Left 08/2013    index finger       Allergies   Allergen Reactions   • Hydrocodone-Acetaminophen Nausea And Vomiting       Family History and Social History reviewed and changed as necessary    REVIEW OF SYSTEM:   No new somatic complaints    PHYSICAL EXAM:  No jaundice icterus or pallor.  No respiratory distress.    Vitals:    03/24/25 0922   Height: 165.1 cm (65\")     There were no vitals filed for this visit.       ECOG score: 0           Vitals reviewed.    ECOG: (0) Fully Active - Able to Carry On All Pre-disease Performance Without Restriction    Lab Results   Component Value Date    HGB 13.1 02/21/2025    HCT 39.4 02/21/2025    MCV 88.3 02/21/2025     02/21/2025    WBC 5.55 02/21/2025    NEUTROABS 3.78 02/21/2025    LYMPHSABS 1.00 02/21/2025    MONOSABS 0.58 02/21/2025    EOSABS 0.12 02/21/2025    BASOSABS 0.05 02/21/2025       Lab Results   Component Value Date    GLUCOSE 113 (H) 02/08/2025    BUN 12 02/08/2025    CREATININE 0.75 02/08/2025     (L) 02/08/2025    K 3.9 02/08/2025    CL 94 (L) 02/08/2025    CO2 22.0 02/08/2025    CALCIUM 8.9 02/08/2025    PROTEINTOT 6.3 02/08/2025    ALBUMIN 3.6 02/08/2025    BILITOT 0.6 02/08/2025    ALKPHOS 96 02/08/2025    AST 25 02/08/2025    ALT 19 02/08/2025             ASSESSMENT & PLAN:  1.  Stage IV well-differentiated pancreatic neuroendocrine tumor clinical [id number] presenting as inpatient with abdominal pain.  CT angiogram chest showed scattered small lung nodules. CT Abd Pelvis and MRI pancreas protocol showed 2.6 cm mass within the body of the pancreas with associated coarse calcification. There is upstream pancreatic ductal dilation with atrophy of the pancreatic tail. Appearance is concerning for primary pancreatic malignancy. Numerous small hypoenhancing masses within the liver and spleen, concerning for metastatic lesions.  Upper abdominal/retroperitoneal lymphadenopathy, including a 3.3 cm mass " adjacent to the left adrenal gland. Liver biopsy non diagnostic.  Ca19-9 and cmp and cbc unremarkable.2/20/2025 FDG PET positive for pancreas primary with axial and proximal appendicular skeletal, splenic, hepatic, and beata metastases.  2/21/2025 para aortic node biopsy positive well-differentiated grade 1 neuroendocrine carcinoma Ki-67 2-3%     -2/14/25 [facility] outpatient followup: Here to go over the above inpatient workup. Need definitive answer from tissue. Reviewed with Dr. Whittington re imaging sites to go after. Thinks the L3 level aortocaval node would be high yield. If can't get that then get spleen bx. Thought the yield and safety from that greater than from EUS bx of pancreas/ node. Could be Pratik ag negative pancreas cancer (hence neg Ca19-9) or PNET or.....I think this may be lymphoma. Will get bx as per Dr. Paul, PET, Echo, LDH, uric acid and see her back.   -2/14/2025 uric acid and LDH normal   -2/19/2025 ejection fraction 63.7% GLS -17%  -2/20/2025 PET shows hypermetabolic pancreas primary with lesions throughout the axial and proximal appendicular skeleton, numerous splenic and hepatic lesions, and beata metastases.  -2/21/2025 CT needle biopsy para-aortic node and bone marrow biopsy.Bone marrow pathology unremarkable.  Lymph node biopsy well-differentiated grade 1 neuroendocrine carcinoma Ki-67 2-3%.    -2/28/2025 [facility] medical oncology follow-up: The patient has pancreatic neuroendocrine tumor grade 1.  However, because I was sniffing out potential lymphoma, I had previously ordered FDG PET and it was unusually abnormal as outlined above for a low-grade PNET.  That has me concerned that there is discordance between the behavior of the tumor and the Ki-67/grade.  I am going to check chromogranin A and pancreatic polypeptide though there is not universal agreement on the usefulness of those for diagnosing managing her following this.  I am going to get a copper dotatate PET and plan to  start Sandostatin LAR in a couple of weeks.  However, if the copper dotatate PET does not like this up like would be expected for well-differentiated neuroendocrine tumor of the pancreas, then I would wonder if we should not lead with Sandostatin but consider Capecitabine Temodar or intermediate to that a tyrosine kinase inhibitor or mTOR inhibitor.  I will communicate with Dr. Terell Floyd who is an expert in this area.  I relish his thoughts on the divination as to the true aggressiveness of this and how to proceed.  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 Floyd would like.  There is no ego involved here.       Addendum: I spoke with Dr. Floyd.  As she is not having pain or any syndromic symptoms, if the copper dotatate PET is positive then 1 could argue for watchful waiting.  Sandostatin analogs could perhaps hold that in check but could also introduce symptoms.  Could consider giving Capecitabine Temodar for 2 or 3 months and repeating the FDG PET and if she gets a rapid response pressed that to maximum cytoreduction and then go to either watchful waiting or maintenance Sandostatin analog.  Ultimately our decision will revolve around the results of her copper dotatate PET.  I will also add her neuron-specific enolase as if that is high that would lean us towards a more aggressive form of PNET.    -3/12/2025 copper dotatate PET  indicates multiple SSA avid hypodense lesions right Howard liver 1.5 cm SUV 19.2 0.8 cm SUV 12.8.  Numerous additional subtle foci avidity in the liver without measurable CT correlate.  Heterogeneous SSA avid spleen SUV 16.1 with no distinct lesions within the spleen on CT with marked FDG avidity correlating to numerous hypoenhancing lesions on outside CT.  Partially calcified SSA avid mass within the pancreatic body 3.2 cm SUV 26.1.   Photopenic left para-aortic mass 2.7 cm which may represent necrosis.  Paracolic superior mesenteric node 0.7 cm SUV 25.2.  Alec hepatic node 0.9 cm SUV 32.  Superior mesenteric/aortocaval central node conglomerate 1.7 cm SUV 33.2.  Several retroperitoneal nodes with intense SSA avidity without FDG avidity.  Additional alec hepatic nodes without SSA avidity with intense FDG avidity.    - 3/24/2025 telemedicine [facility] follow-up: I reviewed CAT scans today with Dr. Greene CT chest abdomen pelvis showing her known liver and splenic and retroperitoneal adenopathy involvement.  Her FDG PET and copper dotatate PET, the latter performed by Dr. Floyd, show an array of abnormalities ranging from low-grade to intermediate grade to high-grade.  2/28/2025 neuron-specific enolase is elevated at 28 with a chromogranin A of 272 and a normal pancreatic polypeptide.   reviewed the lymph node that showed well-differentiated neuroendocrine carcinoma.  Per discussion with Dr. Floyd, given the fact that the FDG PET in the neuron-specific enolase suggests a significant high-grade component not just the well-differentiated low-grade component, he recommends 2 or 3 months of Capecitabine Temodar and then repeating the FDG PET and then pressed to maximum cytoreduction if getting a good response and then either go to watchful waiting or maintenance Sandostatin analog.  We will get her educated and start Capecitabine Temodar.  Side effects discussed in detail with the patient and she will have her cancer treatment preparation visit later this week.  She has a trip for a meeting to [place] that she wants to go to that would put her towards her corazon if we started on 31 March so we will move that out a week and she will get labs that day and then see my nurse practitioner in my absence on May 5 for cycle 2.  She will keep us appraised of her tolerance.  After 2 to 3 months we would repeat imaging of FDG PET and then treat to maximum  cytoreduction.  According to the patient, Dr. Floyd plans on seeing the patient back in June and he left her with the impression that she may be on Capecitabine Temodar for the better part of a year.  I will follow his lead on that as well as when to switch or fold in lanreotide versus continued watchful waiting at that junction.    Time of care today inclusive of time spent today prior to her arrival reviewing complicated set of data from patients [facility] records and communications with Dr. Floyd and during visit translating all that to the patient and going over her communications with Dr. Floyd and after visit instituting this plan took 1 hour patient care time throughout the day today.  Sam Negrete MD    03/24/2025

## 2025-03-24 NOTE — PROGRESS NOTES
This was an audio and video enabled telemedicine encounter.  Done for COVID-19 risk reduction verbal consent given.  Patient in her home and I am in my office both in [place] during the visit.    CHIEF COMPLAINT: No new somatic complaints    Problem List:  Oncology/Hematology History Overview Note   1.  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  CT angiogram chest showed scattered small lung nodules. CT Abd Pelvis and MRI pancreas protocol showed 2.6 cm mass within the body of the pancreas with associated coarse calcification. There is upstream pancreatic ductal dilation with atrophy of the pancreatic tail. Appearance is concerning for primary pancreatic malignancy. Numerous small hypoenhancing masses within the liver and spleen, concerning for metastatic lesions.  Upper abdominal/retroperitoneal lymphadenopathy, including a 3.3 cm mass adjacent to the left adrenal gland. Liver biopsy non diagnostic.  Ca19-9 and cmp and cbc unremarkable.2/20/2025 FDG PET positive for pancreas primary with axial and proximal appendicular skeletal, splenic, hepatic, and beata metastases.  2/21/2025 para aortic node biopsy positive well-differentiated grade 1 neuroendocrine carcinoma Ki-67 2-3%     -2/14/25 [facility] outpatient followup: Here to go over the above inpatient workup. Need definitive answer from tissue. Reviewed with Dr. Whittington re imaging sites to go after. Thinks the L3 level aortocaval node would be high yield. If can't get that then get spleen bx. Thought the yield and safety from that greater than from EUS bx of pancreas/ node. Could be Pratik ag negative pancreas cancer (hence neg Ca19-9) or PNET or.....I think this may be lymphoma. Will get bx as per Dr. Paul, PET, Echo, LDH, uric acid and see her back.   -2/14/2025 uric acid and LDH normal   -2/19/2025 ejection fraction 63.7% GLS -17%  -2/20/2025 PET shows hypermetabolic  pancreas primary with lesions throughout the axial and proximal appendicular skeleton, numerous splenic and hepatic lesions, and beata metastases.  -2/21/2025 CT needle biopsy para-aortic node and bone marrow biopsy.Bone marrow pathology unremarkable.  Lymph node biopsy well-differentiated grade 1 neuroendocrine carcinoma Ki-67 2-3%.    -2/28/2025 [facility] oncology follow-up: The patient has pancreatic neuroendocrine tumor grade 1.  However, because I was sniffing out potential lymphoma, I had previously ordered FDG PET and it was unusually abnormal as outlined above for a low-grade PNET.  That has me concerned that there is discordance between the behavior of the tumor and the Ki-67/grade.  I am going to check chromogranin A and pancreatic polypeptide though there is not universal agreement on the usefulness of those for diagnosing managing her following this.  I am going to get a copper dotatate PET and plan to start Sandostatin LAR in a couple of weeks.  However, if the copper dotatate PET does not like this up like would be expected for well-differentiated neuroendocrine tumor of the pancreas, then I would wonder if we should not lead with Sandostatin but consider Capecitabine Temodar or intermediate to that a tyrosine kinase inhibitor or mTOR inhibitor.  I will communicate with Dr. Terell Floyd who is an expert in this area.  I relish his thoughts on the divination as to the true aggressiveness of this and how to proceed.  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 Floyd would like.  There is no ego involved here.       Addendum: I spoke with Dr. Floyd.  As she is not having pain or any syndromic symptoms, if the copper dotatate PET is positive then 1 could argue for watchful waiting.  Sandostatin analogs could perhaps hold that in check but could also  "introduce symptoms.  Could consider giving Capecitabine Temodar for 2 or 3 months and repeating the FDG PET and if she gets a rapid response pressed that to maximum cytoreduction and then go to either watchful waiting or maintenance Sandostatin analog.  Ultimately our decision will revolve around the results of her copper dotatate PET.  I will also add her neuron-specific enolase as if that is high that would lean us towards a more aggressive form of PNET.     Primary pancreatic neuroendocrine tumor   2025 Initial Diagnosis    Primary pancreatic neuroendocrine tumor     3/31/2025 -  Chemotherapy    OP NEUROENDOCRINE Capecitabine / Temozolomide         HISTORY OF PRESENT ILLNESS:  The patient is a 69 y.o. female, here for follow up on management of pancreatic neuroendocrine carcinoma with a range of well-differentiated intermediate and high-grade component based on imaging with well-differentiated disease on beata biopsy.    Past Medical History:   Diagnosis Date    Asthma     Fibroids     Genital HSV     Hypercholesteremia     Off meds ~     Hypothyroid     Meniere disease, right along with tinnitus     Tubular adenoma of colon 2015    f/u scope  normal    Tubular adenoma of colon 2023     Past Surgical History:   Procedure Laterality Date     SECTION  1985    ORIF PATELLA FRACTURE Left 2018    RHINOPLASTY  1990    TONSILLECTOMY  1964    TRIGGER FINGER RELEASE Left 2013    index finger       Allergies   Allergen Reactions    Hydrocodone-Acetaminophen Nausea And Vomiting       Family History and Social History reviewed and changed as necessary    REVIEW OF SYSTEM:   No new somatic complaints    PHYSICAL EXAM:  No jaundice icterus or pallor.  No respiratory distress.    Vitals:    25 0922   Height: 165.1 cm (65\")     There were no vitals filed for this visit.       ECOG score: 0           Vitals reviewed.    ECOG: (0) Fully Active - Able to Carry " On All Pre-disease Performance Without Restriction    Lab Results   Component Value Date    HGB 13.1 02/21/2025    HCT 39.4 02/21/2025    MCV 88.3 02/21/2025     02/21/2025    WBC 5.55 02/21/2025    NEUTROABS 3.78 02/21/2025    LYMPHSABS 1.00 02/21/2025    MONOSABS 0.58 02/21/2025    EOSABS 0.12 02/21/2025    BASOSABS 0.05 02/21/2025       Lab Results   Component Value Date    GLUCOSE 113 (H) 02/08/2025    BUN 12 02/08/2025    CREATININE 0.75 02/08/2025     (L) 02/08/2025    K 3.9 02/08/2025    CL 94 (L) 02/08/2025    CO2 22.0 02/08/2025    CALCIUM 8.9 02/08/2025    PROTEINTOT 6.3 02/08/2025    ALBUMIN 3.6 02/08/2025    BILITOT 0.6 02/08/2025    ALKPHOS 96 02/08/2025    AST 25 02/08/2025    ALT 19 02/08/2025             ASSESSMENT & PLAN:  1.  Stage IV well-differentiated pancreatic neuroendocrine tumor clinical [id number] presenting as inpatient with abdominal pain.  CT angiogram chest showed scattered small lung nodules. CT Abd Pelvis and MRI pancreas protocol showed 2.6 cm mass within the body of the pancreas with associated coarse calcification. There is upstream pancreatic ductal dilation with atrophy of the pancreatic tail. Appearance is concerning for primary pancreatic malignancy. Numerous small hypoenhancing masses within the liver and spleen, concerning for metastatic lesions.  Upper abdominal/retroperitoneal lymphadenopathy, including a 3.3 cm mass adjacent to the left adrenal gland. Liver biopsy non diagnostic.  Ca19-9 and cmp and cbc unremarkable.2/20/2025 FDG PET positive for pancreas primary with axial and proximal appendicular skeletal, splenic, hepatic, and beata metastases.  2/21/2025 para aortic node biopsy positive well-differentiated grade 1 neuroendocrine carcinoma Ki-67 2-3%     -2/14/25 [facility] outpatient followup: Here to go over the above inpatient workup. Need definitive answer from tissue. Reviewed with Dr. Whittington re imaging sites to go after. Thinks the L3 level  aortocaval node would be high yield. If can't get that then get spleen bx. Thought the yield and safety from that greater than from EUS bx of pancreas/ node. Could be Pratik ag negative pancreas cancer (hence neg Ca19-9) or PNET or.....I think this may be lymphoma. Will get bx as per Dr. Paul, PET, Echo, LDH, uric acid and see her back.   -2/14/2025 uric acid and LDH normal   -2/19/2025 ejection fraction 63.7% GLS -17%  -2/20/2025 PET shows hypermetabolic pancreas primary with lesions throughout the axial and proximal appendicular skeleton, numerous splenic and hepatic lesions, and beata metastases.  -2/21/2025 CT needle biopsy para-aortic node and bone marrow biopsy.Bone marrow pathology unremarkable.  Lymph node biopsy well-differentiated grade 1 neuroendocrine carcinoma Ki-67 2-3%.    -2/28/2025 [facility] medical oncology follow-up: The patient has pancreatic neuroendocrine tumor grade 1.  However, because I was sniffing out potential lymphoma, I had previously ordered FDG PET and it was unusually abnormal as outlined above for a low-grade PNET.  That has me concerned that there is discordance between the behavior of the tumor and the Ki-67/grade.  I am going to check chromogranin A and pancreatic polypeptide though there is not universal agreement on the usefulness of those for diagnosing managing her following this.  I am going to get a copper dotatate PET and plan to start Sandostatin LAR in a couple of weeks.  However, if the copper dotatate PET does not like this up like would be expected for well-differentiated neuroendocrine tumor of the pancreas, then I would wonder if we should not lead with Sandostatin but consider Capecitabine Temodar or intermediate to that a tyrosine kinase inhibitor or mTOR inhibitor.  I will communicate with Dr. Terell Floyd who is an expert in this area.  I relish his thoughts on the divination as to the true aggressiveness of this and how to proceed.  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 Floyd would like.  There is no ego involved here.       Addendum: I spoke with Dr. Floyd.  As she is not having pain or any syndromic symptoms, if the copper dotatate PET is positive then 1 could argue for watchful waiting.  Sandostatin analogs could perhaps hold that in check but could also introduce symptoms.  Could consider giving Capecitabine Temodar for 2 or 3 months and repeating the FDG PET and if she gets a rapid response pressed that to maximum cytoreduction and then go to either watchful waiting or maintenance Sandostatin analog.  Ultimately our decision will revolve around the results of her copper dotatate PET.  I will also add her neuron-specific enolase as if that is high that would lean us towards a more aggressive form of PNET.    -3/12/2025 copper dotatate PET [facility] indicates multiple SSA avid hypodense lesions right Howard liver 1.5 cm SUV 19.2 0.8 cm SUV 12.8.  Numerous additional subtle foci avidity in the liver without measurable CT correlate.  Heterogeneous SSA avid spleen SUV 16.1 with no distinct lesions within the spleen on CT with marked FDG avidity correlating to numerous hypoenhancing lesions on outside CT.  Partially calcified SSA avid mass within the pancreatic body 3.2 cm SUV 26.1.  Photopenic left para-aortic mass 2.7 cm which may represent necrosis.  Paracolic superior mesenteric node 0.7 cm SUV 25.2.  Alec hepatic node 0.9 cm SUV 32.  Superior mesenteric/aortocaval central node conglomerate 1.7 cm SUV 33.2.  Several retroperitoneal nodes with intense SSA avidity without FDG avidity.  Additional alec hepatic nodes without SSA avidity with intense FDG avidity.    [facility] I reviewed CAT scans today with Dr. Greene CT chest abdomen pelvis showing her known liver and splenic and  retroperitoneal adenopathy involvement.  Her FDG PET and copper dotatate PET, the latter performed by Dr. Floyd, show an array of abnormalities ranging from low-grade to intermediate grade to high-grade.  2/28/2025 neuron-specific enolase is elevated at 28 with a chromogranin A of 272 and a normal pancreatic polypeptide.  [facility] reviewed the lymph node that showed well-differentiated neuroendocrine carcinoma.  Per discussion with Dr. Floyd, given the fact that the FDG PET in the neuron-specific enolase suggests a significant high-grade component not just the well-differentiated low-grade component, he recommends 2 or 3 months of Capecitabine Temodar and then repeating the FDG PET and then pressed to maximum cytoreduction if getting a good response and then either go to watchful waiting or maintenance Sandostatin analog.  We will get her educated and start Capecitabine Temodar.  Side effects discussed in detail with the patient and she will have her cancer treatment preparation visit later this week.  She has a trip for a meeting to [place] that she wants to go to that would put her towards her corazon if we started on 31 March so we will move that out a week and she will get labs that day and then see my nurse practitioner in my absence on May 5 for cycle 2.  She will keep us appraised of her tolerance.  After 2 to 3 months we would repeat imaging of FDG PET and then treat to maximum cytoreduction.  According to the patient, Dr. Floyd plans on seeing the patient back in June and he left her with the impression that she may be on Capecitabine Temodar for the better part of a year.  I will follow his lead on that as well as when to switch or fold in lanreotide versus continued watchful waiting at that junction.    Time of care today inclusive of time spent today prior to her arrival reviewing complicated set of data from patients [facility] records and communications with Dr. Floyd and during visit translating all that to  the patient and going over her communications with Dr. Floyd and after visit instituting this plan took 1 hour patient care time throughout the day today.  Sam Negrete MD    03/24/2025

## 2025-03-25 ENCOUNTER — SPECIALTY PHARMACY (OUTPATIENT)
Facility: HOSPITAL | Age: 70
End: 2025-03-25
Payer: MEDICARE

## 2025-03-25 DIAGNOSIS — D3A.8 PRIMARY PANCREATIC NEUROENDOCRINE TUMOR: Primary | ICD-10-CM

## 2025-03-25 RX ORDER — CAPECITABINE 150 MG/1
300 TABLET, FILM COATED ORAL 2 TIMES DAILY
Qty: 56 TABLET | Refills: 5 | Status: SHIPPED | OUTPATIENT
Start: 2025-03-25 | End: 2025-03-25 | Stop reason: SDUPTHER

## 2025-03-25 RX ORDER — TEMOZOLOMIDE 140 MG/1
140 CAPSULE ORAL DAILY
Qty: 5 CAPSULE | Refills: 5 | Status: SHIPPED | OUTPATIENT
Start: 2025-03-25 | End: 2025-03-25 | Stop reason: SDUPTHER

## 2025-03-25 RX ORDER — CAPECITABINE 500 MG/1
1000 TABLET, FILM COATED ORAL 2 TIMES DAILY
Qty: 56 TABLET | Refills: 5 | Status: SHIPPED | OUTPATIENT
Start: 2025-03-25 | End: 2025-03-26 | Stop reason: SDUPTHER

## 2025-03-25 RX ORDER — CAPECITABINE 150 MG/1
300 TABLET, FILM COATED ORAL 2 TIMES DAILY
Qty: 56 TABLET | Refills: 5 | Status: SHIPPED | OUTPATIENT
Start: 2025-03-25 | End: 2025-03-26 | Stop reason: SDUPTHER

## 2025-03-25 RX ORDER — TEMOZOLOMIDE 140 MG/1
140 CAPSULE ORAL DAILY
Qty: 5 CAPSULE | Refills: 5 | Status: SHIPPED | OUTPATIENT
Start: 2025-03-25 | End: 2025-03-26 | Stop reason: SDUPTHER

## 2025-03-25 RX ORDER — TEMOZOLOMIDE 180 MG/1
180 CAPSULE ORAL DAILY
Qty: 5 CAPSULE | Refills: 5 | Status: SHIPPED | OUTPATIENT
Start: 2025-03-25 | End: 2025-03-26 | Stop reason: SDUPTHER

## 2025-03-25 RX ORDER — CAPECITABINE 500 MG/1
1000 TABLET, FILM COATED ORAL 2 TIMES DAILY
Qty: 56 TABLET | Refills: 5 | Status: SHIPPED | OUTPATIENT
Start: 2025-03-25 | End: 2025-03-25 | Stop reason: SDUPTHER

## 2025-03-25 RX ORDER — TEMOZOLOMIDE 180 MG/1
180 CAPSULE ORAL DAILY
Qty: 5 CAPSULE | Refills: 5 | Status: SHIPPED | OUTPATIENT
Start: 2025-03-25 | End: 2025-03-25 | Stop reason: SDUPTHER

## 2025-03-25 NOTE — PROGRESS NOTES
Oral Chemotherapy - New Referral    Received a referral from Dr. Negrete    Treatment Plan: Temodar (temozolomide) and Xeloda (capecitabine)  Start Date of Treatment: As soon as oral specialty medication is available.  Indication: pancreatic neuroendocrine tumor  Relevant Past Treatments: First line treatment    Therapy Appropriate Based on Treatment Guidelines and FDA Labeling?: Yes  Therapeutic Goals: Continue treatment until progression or intolerable toxicity  Patient Can Self-Administer Oral Medications?: Yes  Port: N/A  Supportive care medications: Bactrim DS and Ondansetron, Prochlorperazine, signed and sent  Patient will need to take ondansetron 30 minutes prior to taking temozolomide.     Drug-Drug Interactions: The current medication list was reviewed and there are no relevant drug-drug interactions with the specialty medication.  Medication Allergies: The patient has no relevant allergies as it relates to their oral specialty medication  Review of Labs/Dose Adjustments: The patient's most recent labs were reviewed and all are WNL to start treatment at this dose.     Capecitabine 750mg/m2 x 1.65 = 1237mg - rounded up to 1300mg due to tablet size    Temozolomide 200mg/m2 x 1.65 = 330mg - rounded down to 320mg due to minimizing pill burden    A prescription was released to [facility] for   Drug: Xeloda (capecitabine)  Strength: 150 mg  Directions: Take 2 tablets by mouth with 1 other capecitabine prescription for 1,300 mg total 2 (Two) Times a Day. On Days 1-14 then 14 days off of each 28-day   Quantity: 56  Refills: 5    Drug: Xeloda (capecitabine)  Strength: 500 mg  Directions: Take 2 tablets by mouth with 1 other capecitabine prescription for 1,300 mg total 2 (Two) Times a Day. On Days 1-14 then 14 days off of each 28-day   Quantity: 56  Refills: 5    Drug: Temodar (temozolomide)  Strength: 140 mg  Directions: Take 1 capsule by mouth with 1 other temozolomide prescription for 320  mg total Daily. On Days 10-14 of a 28-day cycle   Quantity: 5  Refills: 5    Drug: Temodar (temozolomide)  Strength: 180 mg  Directions: Take 1 capsule by mouth with 1 other temozolomide prescription for 320 mg total Daily. On Days 10-14 of a 28-day cycle   Quantity: 5  Refills: 5    Pharmacy education is scheduled for 3/27. CCA and consent will be signed at that time.    Rosa Toribio PharmD, [facility]  Clinical Oncology Pharmacy Specialist  Phone: [phone]      3/25/2025  11:18 EDT

## 2025-03-25 NOTE — PROGRESS NOTES
Oral Chemotherapy - Double Check    Capecitabine prescriptions: 750 mg/m2 PO twice daily on days 1-14 in a 28-day cycle  Drug: Xeloda (capecitabine)  Strength: 500 mg  Directions: Take 2 tablets by mouth with 1 other capecitabine prescription for 1,300 mg by mouth twice daily on days 1-14, then off for 14 days in a 28-day cycle.  QTY: 56 tablets  RF:5    Released to pharmacy: [facility]    Drug: Xeloda (capecitabine)   Strength: 150 mg  Directions: Take 2 tablets by mouth with 1 other capecitabine prescription for 1,300 mg by mouth twice daily on days 1-14, then off for 14 days in a 28-day cycle.  QTY: 56 tablets  RF:5    Released to pharmacy: [facility]    Temozolomide prescriptions: 200 mg/m2 PO daily on days 10-14 in a 28-day cycle  Drug: Temodar (temozolomide)  Strength: 180 mg  Directions: Take 1 capsule by mouth with 1 other temozolomide prescription for 320 mg by mouth daily on days 10-14 in a 28-day cycle.  QTY: 5 capsules  RF:5    Released to pharmacy: [facility]    Drug: Temodar (temozolomide)  Strength: 140 mg  Directions: Take 1 capsule by mouth with 1 other temozolomide prescription for 320 mg by mouth daily on days 10-14 in a 28-day cycle.  QTY: 5 capsules  RF:5    Released to pharmacy: [facility]    Completed independent double check on medication order/RX.    Sangita Ford, PharmD  Clinic Oncology Pharmacy Specialist  [phone]    3/25/2025  11:59 EDT

## 2025-03-26 RX ORDER — CAPECITABINE 500 MG/1
1000 TABLET, FILM COATED ORAL 2 TIMES DAILY
Qty: 56 TABLET | Refills: 5 | Status: SHIPPED | OUTPATIENT
Start: 2025-03-26

## 2025-03-26 RX ORDER — TEMOZOLOMIDE 180 MG/1
180 CAPSULE ORAL DAILY
Qty: 5 CAPSULE | Refills: 5 | Status: SHIPPED | OUTPATIENT
Start: 2025-03-26

## 2025-03-26 RX ORDER — TEMOZOLOMIDE 140 MG/1
140 CAPSULE ORAL DAILY
Qty: 5 CAPSULE | Refills: 5 | Status: SHIPPED | OUTPATIENT
Start: 2025-03-26

## 2025-03-26 RX ORDER — CAPECITABINE 150 MG/1
300 TABLET, FILM COATED ORAL 2 TIMES DAILY
Qty: 56 TABLET | Refills: 5 | Status: SHIPPED | OUTPATIENT
Start: 2025-03-26

## 2025-03-26 NOTE — PROGRESS NOTES
The patient's copays for capecitabine and temozolomide are unaffordable at [facility]. The patient has a [employer] supplement plan that cannot be billed at [facility]. I will send new prescriptions for capecitabine and temozolomide to [facility] today since they are able to bill the [employer] supplement at [facility] .     Sangita Ford, PharmD  Clinic Oncology Pharmacy Specialist  [phone]

## 2025-03-27 ENCOUNTER — OFFICE VISIT (OUTPATIENT)
Dept: ONCOLOGY | Facility: CLINIC | Age: 70
End: 2025-03-27
Payer: COMMERCIAL

## 2025-03-27 ENCOUNTER — HOSPITAL ENCOUNTER (OUTPATIENT)
Dept: ONCOLOGY | Facility: HOSPITAL | Age: 70
Discharge: HOME | End: 2025-03-27
Payer: MEDICARE

## 2025-03-27 ENCOUNTER — TELEPHONE (OUTPATIENT)
Dept: ONCOLOGY | Facility: CLINIC | Age: 70
End: 2025-03-27

## 2025-03-27 ENCOUNTER — SPECIALTY PHARMACY (OUTPATIENT)
Dept: ONCOLOGY | Facility: HOSPITAL | Age: 70
End: 2025-03-27
Payer: MEDICARE

## 2025-03-27 VITALS
OXYGEN SATURATION: 97 % | HEIGHT: 65 IN | WEIGHT: 134 LBS | TEMPERATURE: 98.6 F | HEART RATE: 70 BPM | DIASTOLIC BLOOD PRESSURE: 78 MMHG | BODY MASS INDEX: 22.33 KG/M2 | SYSTOLIC BLOOD PRESSURE: 151 MMHG

## 2025-03-27 DIAGNOSIS — D3A.8 PRIMARY PANCREATIC NEUROENDOCRINE TUMOR: Primary | Chronic | ICD-10-CM

## 2025-03-27 PROBLEM — C7A.1 MALIGNANT POORLY DIFFERENTIATED NEUROENDOCRINE CARCINOMA: Status: ACTIVE | Noted: 2025-03-27

## 2025-03-27 LAB
CYTO UR: NORMAL
LAB AP CASE REPORT: NORMAL
LAB AP CLINICAL INFORMATION: NORMAL
LAB AP DIAGNOSIS COMMENT: NORMAL
LAB AP OUTSIDE REPORT, ADDENDUM: NORMAL
PATH REPORT.FINAL DX SPEC: NORMAL
PATH REPORT.GROSS SPEC: NORMAL

## 2025-03-27 PROCEDURE — 99214 OFFICE O/P EST MOD 30 MIN: CPT | Performed by: NURSE PRACTITIONER

## 2025-03-27 NOTE — TELEPHONE ENCOUNTER
Caller: [facility], [facility] ([place]) - [place], [place] - [address] [place] [address], [facility] [address] - [phone] [facility] [phone] FX    Relationship: Pharmacy    Best call back number: [phone], EXT. [phone] FOR PHARMACIST     What is the best time to reach you: ASAP    Who are you requesting to speak with (clinical staff, provider,  specific staff member): CLINICAL      What was the call regarding: SUZANNE FROM [employer] NEEDS TO SPEAK WITH CLINICAL, NEEDS CLARIFICATION ON THE PT'S RX FOR   CAPECITABINE AND TEMOZOLOMIDE.  PLEASE CALL NUMBER PROVIDED TO DISCUSS/CLARIFY.

## 2025-03-27 NOTE — PROGRESS NOTES
Specialty Pharmacy Patient Management Program  Oncology Initial Assessment       Arely Mesa is a 69 y.o. female with pancreatic neuroendocrine tumor seen by an Oncology provider and enrolled in the Oncology Patient Management program offered by [facility].  An initial outreach was conducted, including assessment of therapy appropriateness and specialty medication education for Temodar (temozolomide) and Xeloda (capecitabine). The patient was introduced to services offered by [facility], including: regular assessments, refill coordination, curbside pick-up or mail order delivery options, prior authorization maintenance, and financial assistance programs as applicable. The patient was also provided with contact information for the pharmacy team.     Regimen:   -Temodar (temozolomide) 180 mg and 140 mg capsules - Take 320 mg by mouth at bedtime on days 10-14 in a 28-day cycle.  -Xeloda (capecitabine) 500 mg and 150 mg tablets - Take 1,300 mg by mouth twice daily with food on days 1-14, then 14 days off in a 28-day cycle.     Start date of oral specialty medication: The patient is planning to start treatment on 4/7/25. The patient has not received temozolomide or capecitabine from [facility] yet, but these prescriptions are currently in process in [facility]. Maria Luisa Rodriguez, Pharmacy Care Coordinator, met with the patient after education today to provide the contact information for [facility].     Relevant Past Medical History, Comorbidities, and Vaccines  Relevant medical history and concomitant health conditions were discussed with the patient. The patient's chart has been reviewed for relevant past medical history and comorbid health conditions and updated as necessary.  Vaccines are coordinated by the patient's oncologist and primary care provider.  Past Medical History:   Diagnosis Date    Asthma 1988    Fibroids     Genital HSV 1975    Hypercholesteremia 2008     Off meds ~ 2014    Hypothyroid 2003    Meniere disease, right along with tinnitus 2018    Tubular adenoma of colon 08/2015    f/u scope 2018 normal    Tubular adenoma of colon 08/2023     Social History     Socioeconomic History    Marital status:     Number of children: 2   Tobacco Use    Smoking status: Never   Vaping Use    Vaping status: Never Used   Substance and Sexual Activity    Alcohol use: Yes     Alcohol/week: 2.0 standard drinks of alcohol     Types: 2 Glasses of wine per week    Drug use: No    Sexual activity: Not Currently     Allergies  Known allergies and reactions were discussed with the patient. The patient's chart has been reviewed for allergy information and updated as necessary.   Allergies   Allergen Reactions    Hydrocodone-Acetaminophen Nausea And Vomiting      Current Medication List  This medication list has been reviewed with the patient and evaluated for any interactions or necessary modifications/recommendations, and updated to include all prescription medications, OTC medications, and supplements the patient is currently taking.  This list reflects what is contained in the patient's profile, which has also been marked as reviewed to communicate to other providers it is the most up to date version of the patient's current medication therapy.   Prior to Admission medications    Medication Sig Start Date End Date Taking? Authorizing Provider   Calcium Citrate-Vitamin D3 (CITRACAL) 315-6.25 MG-MCG tablet tablet Take 2 tablets by mouth Daily.    Provider, MD Janell   capecitabine (XELODA) 150 MG chemo tablet Take 2 tablets by mouth with 1 other capecitabine prescription for 1,300 mg total 2 (Two) Times a Day. On Days 1-14 then 14 days off of each 28-day cycle 3/26/25   Sam Negrete MD   capecitabine (XELODA) 500 MG chemo tablet Take 2 tablets by mouth with 1 other capecitabine prescription for 1,300 mg total 2 (Two) Times a Day. On Days 1-14 then 14 days off of each 28-day  cycle 3/26/25   Sam Negrete MD   cetirizine (zyrTEC) 10 MG tablet Take 1 tablet by mouth Daily.    Janell Vazquez MD   cyclobenzaprine (FLEXERIL) 10 MG tablet Take 1 tablet by mouth 3 (Three) Times a Day As Needed for Muscle Spasms. 2/8/25   Dalila Mendoza MD   cycloSPORINE (RESTASIS) 0.05 % ophthalmic emulsion Apply 2 drops to eye(s) as directed by provider Every 12 (Twelve) Hours. 10/16/24   Janell Vazquez MD   Duavee 0.45-20 MG tablet Take 1 each by mouth Daily. 3/24/25   Linus Baxter MD   fluticasone (FLONASE) 50 MCG/ACT nasal spray Administer 2 sprays into the nostril(s) as directed by provider Daily.    Janell Vazquez MD   glucosamine-chondroitin 500-400 MG per tablet Take 1 tablet by mouth Daily.    Janell Vazquez MD   levothyroxine (SYNTHROID, LEVOTHROID) 75 MCG tablet Take 1 tablet by mouth Daily.    Janell Vazquez MD   melatonin 5 MG tablet tablet Take 2 tablets by mouth Every Night. Takes 1-2 tablets    Janell Vazquez MD   Mirabegron ER (Myrbetriq) 25 MG tablet sustained-release 24 hour 24 hr tablet Take 1 tablet by mouth Daily. 3/24/25   Linus Baxter MD   montelukast (SINGULAIR) 10 MG tablet Take 1 tablet by mouth Every Night.    Janell Vazquez MD   ondansetron (ZOFRAN) 8 MG tablet Take 1 tablet PO 30 minutes prior to cancer treatment daily and every 8 hours as needed for breakthrough nausea 3/19/25   Sam Negrete MD   ondansetron ODT (ZOFRAN-ODT) 4 MG disintegrating tablet Place 1 tablet on the tongue Every 8 (Eight) Hours As Needed for Nausea or Vomiting. 2/8/25   Dalila Mendoza MD   oxyCODONE-acetaminophen (Percocet) 5-325 MG per tablet Take 1 tablet by mouth Every 6 (Six) Hours As Needed for Moderate Pain. 2/8/25   Dalila Mendoza MD   polycarbophil (calcium polycarbophil) 625 MG tablet tablet Take 1 tablet by mouth Daily. Takes 1-2 times daily    Janell Vazquez MD   promethazine (PHENERGAN) 25 MG tablet Take 1 tablet by mouth  Every 8 (Eight) Hours As Needed for Nausea or Vomiting. 3/24/25   Sam Negrete MD   sennosides-docusate (PERICOLACE) 8.6-50 MG per tablet Take 2 tablets by mouth 2 (Two) Times a Day. 2/8/25   Dalila Mendoza MD   sulfamethoxazole-trimethoprim (BACTRIM DS,SEPTRA DS) 800-160 MG per tablet Take 1 tablet by mouth 3 (Three) Times a Week. Take 1 tablet on Mondays, Wednesdays and Fridays. 3/24/25   Sam Negrete MD   temozolomide (TEMODAR) 140 MG chemo capsule Take 1 capsule by mouth with 1 other temozolomide prescription for 320 mg total Daily. On Days 10-14 of a 28-day cycle 3/26/25   Sam Negrete MD   temozolomide (TEMODAR) 180 MG chemo capsule Take 1 capsule by mouth with 1 other temozolomide prescription for 320 mg total Daily. On Days 10-14 of a 28-day cycle 3/26/25   Sam Negrete MD   TURMERIC PO Take 553 mg by mouth Daily.    Provider, MD Janell     Drug Interactions  Reviewed concomitant medications, allergies, labs, comorbidities/medical history, and immunization history.   Drug-drug interactions noted and discussed during education:   no significant drug interactions noted.   Reminded the patient to let us know before making any changes or starting any new prescription or OTC medications so we can first assess drug interactions.    Recommended Medications Assessment  Bone Health (such as calcium/vitamin D, bisphosphonate, RANKL inhibitor) - Not Indicated   VTE prophylaxis - Not Indicated   Prophylactic antimicrobials - Indicated, not currently taking The patient will start taking PJP Prophylaxis: Bactrim DS 1 tablet by mouth on Mondays, Wednesdays, and Fridays.   Tumor lysis syndrome prophylaxis - Not Indicated    Relevant Laboratory Values  Lab Results   Component Value Date    GLUCOSE 113 (H) 02/08/2025    CALCIUM 8.9 02/08/2025     (L) 02/08/2025    K 3.9 02/08/2025    CO2 22.0 02/08/2025    CL 94 (L) 02/08/2025    BUN 12 02/08/2025    CREATININE 0.75 02/08/2025    EGFRIFAFRI >60 12/01/2021     EGFRIFNONA >60 12/01/2021    BCR 16.0 02/08/2025    ANIONGAP 13.0 02/08/2025     Lab Results   Component Value Date    WBC 5.55 02/21/2025    RBC 4.46 02/21/2025    HGB 13.1 02/21/2025    HCT 39.4 02/21/2025    MCV 88.3 02/21/2025    MCH 29.4 02/21/2025    MCHC 33.2 02/21/2025    RDW 12.6 02/21/2025    RDWSD 40.9 02/21/2025    MPV 8.8 02/21/2025     02/21/2025    NEUTRORELPCT 68.0 02/21/2025    LYMPHORELPCT 18.0 (L) 02/21/2025    MONORELPCT 10.5 02/21/2025    EOSRELPCT 2.2 02/21/2025    BASORELPCT 0.9 02/21/2025    AUTOIGPER 0.4 02/21/2025    NEUTROABS 3.78 02/21/2025    LYMPHSABS 1.00 02/21/2025    MONOSABS 0.58 02/21/2025    EOSABS 0.12 02/21/2025    BASOSABS 0.05 02/21/2025    AUTOIGNUM 0.02 02/21/2025    NRBC 0.0 02/21/2025     The above labs have been reviewed. No dose adjustments are needed for the oral specialty medications based on the labs.    Initial Education Provided for Specialty Medication  The patient has been provided with the following education. All questions and concerns have been addressed prior to the patient receiving the medication, and the patient has verbalized understanding of the education and any materials provided.  Additional patient education shall be provided and documented upon request by the patient, provider or payer.      Provided patient with:   Chemo calendar to help improve adherence., Education sheets about the medication, 24-hour clinic phone number and my contact information and instructions to call should additional questions arise.     Medication Education Sheets Provided: (select all that apply)  Oral Specialty Medication: Xeloda (capecitabine) and Temodar (temozolomide)    Other Education Sheets Provided: (select all that apply)  Adherence, CINV, Constipation, Diarrhea, Symptom Tracker Sheet, and Proper Disposal Information    TOPICS COMMENTS   Storage and Handling of Oral Specialty Medication Store in the original container, in a dry location out of direct  sunlight, and out of reach of children or pets. Store at room temperature. Discussed safe handling and what to do with any unused medication.   Administration of Oral Specialty Medication Take capecitabine with food at the same times each day. Take temozolomide on an empty stomach at bedtime, 1 hour before and 2 hours after eating. Do not crush or chew capecitabine tablets. Do not open or dissolve temozolomide capsules, unless otherwise instructed by the pharmacist.   Adherence to Oral Specialty Regimen and Handling Missed Doses Patient is likely to have good treatment adherence; reinforced the importance of adherence. Reviewed how to address missed doses and encouraged the patient to let us know of any missed doses.   Anemia: role of RBC, cause, s/s, ways to manage, role of transfusion Reviewed the role of RBC and the use of transfusions if hemoglobin decreases too much.  Patient to notify us if she experiences shortness of breath, dizziness, or palpitations.  Also let patient know that she could feel more tired than usual and to try to stay active, but rest if she needs to.    Thrombocytopenia: role of platelet, cause, s/s, ways to prevent bleeding, things to avoid, when to seek help Reviewed the role of platelets in blood clotting and when to call clinic (bloody nose that bleeds for 5 minutes despite pressure, a cut that won't stop bleeding despite pressure, gums that bleed excessively with brushing or flossing). Recommended using an electric razor, soft bristle toothbrush, and blowing your nose gently.    Neutropenia: role of WBC, cause, infection precautions, s/s of infection, when to call MD Reviewed the role of WBC, good infection prevention practices, and when to call the clinic (temperature 100.4F, sore throat, burning urination, etc).   Nutrition and Appetite Changes:  importance of maintaining healthy diet & weight, ways to manage to improve intake, dietary consult, exercise regimen, electrolyte and/or  blood glucose abnormalities Decreased Appetite: Discussed the risk of decreased appetite. Recommended eating smaller, more frequent meals. Instructed the patient to contact the clinic if losing weight or having difficulty eating enough to maintain energy level.   Diarrhea: causes, s/s of dehydration, ways to manage, dietary changes, when to call MD Discussed the risk of diarrhea. Instructed patient on use OTC loperamide with diarrhea onset, but emphasized the importance of calling the clinic if 4-6 episodes in 24 hours not relieved by OTC loperamide.   Constipation: causes, ways to manage, dietary changes, when to call MD Provided supplementary handout with instructions for use of docusate and other OTC therapies to manage constipation.  Patient was instructed to call us if medications aren't working.   Nausea/Vomiting: cause, use of antiemetics, dietary changes, when to call MD Emetic risk: Low-Minimal for capecitabine, moderate/high for temozolomide  Scheduled meds: Approximately 30 minutes before temozolomide, take ondansetron 8 mg by mouth  PRN home meds: Promethazine 25 mg by mouth every 8 hours as needed for nausea or vomiting  Pharmacy home meds sent to: [facility] in [place]     Instructed the patient to take the scheduled anti-nausea medications even if she does not feel nauseous.  I explained this is to prevent delayed nausea.    Instructed the patient to take a dose of the PRN medication at the first onset of nausea and if it's not working to call us for additional medications.  Also provided non-drug measures to mitigate nausea.   Mouth Sores: causes, oral care, ways to manage The patient was encouraged to make a mouth wash mixture of salt, baking soda, and water or to use a non-alcohol containing OTC mouthwash to swish and spit four times daily after meals and before bedtime, with the goal of preventing mouth sores and keeping the mouth clean.  Use of a soft bristle toothbrush was  recommended.  The patient was instructed to avoid alcohol-containing OTC mouthwashes.   Alopecia: cause, ways to manage, resources Discussed the possibility of hair thinning or hair loss with temozolomide. Recommended covering the head with a hat and/or protecting the skin on the head with SPF 30 or higher.    Pain: causes, ways to manage Headache: Reviewed the potential for headache, and encouraged the patient to call the clinic if the headache follows a head injury, is severe or starts suddenly, if it last more than 3 days, or if it is associated with vomiting, visual disturbance, confusion, etc.   Skin/Nail Changes: cause, s/s, ways to manage HFS: Hand-Foot Syndrome was discussed, including signs/symptoms, prevention measures, and treatment measures. A supplementary handout with this information was also given to the patient.   Organ Toxicities: cause, s/s, need for diagnostic tests, labs, when to notify MD Discussed potential effects on organ systems, monitoring, diagnostic tests, labs, and when to notify the MD. Discussed the signs/symptoms of the following: cardiotoxicity, hepatotoxicity, nephrotoxicity, and skin changes.   Survivorship: distress, distress assessment, secondary malignancies, early/late effects, follow-up, social issues, social support Discussed the rare, but possible risk of secondary malignancies months to years after treatment, most commonly acute myeloid leukemia.   Miscellaneous Financial Issues: Unknown copays at [facility] as of now (prescriptions are in process). Maria Luisa Rodriguez is the Pharmacy Care Coordinator.   Lab Draws: On or before day 1 of each cycle, no sooner than 3 days early.  Discussed the possibility of abdominal pain with capecitabine   Discussed dehydration is a possible side effect of capecitabine and counseled the patient to stay well hydrated during treatment   Discussed DPD deficiency is a rare enzyme deficiency where the body is unable to break down capecitabine.  Counseled the patient to call the clinic if she experiences severe side effects within 1-2 days of starting capecitabine.    Infertility and Sexuality:  causes, fertility preservation options, sexuality changes, ways to manage, importance of birth control Oral Oncology Therapy: Reviewed safe sex practices and the importance of minimizing exposure to body fluids while on oral oncology therapy. The patient is not of childbearing potential.   Home Care: how to manage bodily fluids Counseled on management of soiled linens and proper flush technique.  Discussed how to manage all the side effects at home and advised when to contact the MD office     Adherence and Self-Administration  Barriers to Patient Adherence and/or Self-Administration: None identified  Methods for Supporting Patient Adherence and/or Self-Administration: Dosing calendar and Pill box  Expected duration of therapy: Until disease progression or intolerable toxicity    Goals of Therapy  Patient Goals of Therapy:   Consistently take medications as prescribed  Patient will adhere to medication regimen  Patient will report any medication side effects to healthcare provider  Clinical Goals:    Goals Addressed Today        Specialty Pharmacy General Goal      Clinical goal/therapeutic target: disease control, per the recent oncology clinic notes and labs.              Support patient understanding of medication regimen  Ensure patient knows the pharmacy contact information    Reassessment Plan & Follow-Up  Pharmacist to perform regular reassessments no more than (6) months from the previous assessment.  Welcome information and patient satisfaction survey to be sent by retail team with patient's initial fill.  Care Coordinator to set up future refill outreaches, coordinate prescription delivery, and escalate clinical questions to pharmacist.     Additional Plans, Therapy Recommendations or Therapy Problems to Be Addressed:   - The patient is planning to start  treatment on 4/7/25. The patient has not received temozolomide or capecitabine from [facility] yet, but these prescriptions are currently in process in [facility]. Maria Luisa Rodriguez, Pharmacy Care Coordinator, met with the patient after education today to provide the contact information for [facility].     Attestation  I attest the patient was actively involved in and has agreed to the above plan of care. If the prescribed therapy is at any point deemed not appropriate based on the current or future assessments, a consultation will be initiated with the patient's specialty care provider to determine the best course of action. The revised plan of therapy will be documented along with any required assessments and/or additional patient education provided.     Sangita Ford, PharmD  [facility] Oncology Pharmacy Specialist  [phone]    Date and Time: 3/27/2025  13:57 EDT

## 2025-03-27 NOTE — PROGRESS NOTES
CHEMOTHERAPY PREPARATION    Arely Mesa  [id number]  1955    Chief Complaint: Treatment preparation and needs assessment    History of present illness:  Arely Mesa is a 69 y.o. year old female who is here today with her sister for treatment preparation and needs assessment.  The patient has been diagnosed with pancreatic neuroendocrine tumor and is scheduled to begin treatment with Capecitabine / Temozolomide.     Oncology History:    Oncology/Hematology History Overview Note   1.  Stage IV well-differentiated pancreatic neuroendocrine tumor clinical [id number] presenting as inpatient with abdominal pain.  CT angiogram chest showed scattered small lung nodules. CT Abd Pelvis and MRI pancreas protocol showed 2.6 cm mass within the body of the pancreas with associated coarse calcification. There is upstream pancreatic ductal dilation with atrophy of the pancreatic tail. Appearance is concerning for primary pancreatic malignancy. Numerous small hypoenhancing masses within the liver and spleen, concerning for metastatic lesions.  Upper abdominal/retroperitoneal lymphadenopathy, including a 3.3 cm mass adjacent to the left adrenal gland. Liver biopsy non diagnostic.  Ca19-9 and cmp and cbc unremarkable.2/20/2025 FDG PET positive for pancreas primary with axial and proximal appendicular skeletal, splenic, hepatic, and beata metastases.  2/21/2025 para aortic node biopsy positive well-differentiated grade 1 neuroendocrine carcinoma Ki-67 2-3%     -2/14/25 [facility] outpatient followup: Here to go over the above inpatient workup. Need definitive answer from tissue. Reviewed with Dr. Whittington re imaging sites to go after. Thinks the L3 level aortocaval node would be high yield. If can't get that then get spleen bx. Thought the yield and safety from that greater than from EUS bx of pancreas/ node. Could be Pratik ag negative pancreas cancer (hence neg Ca19-9) or PNET or.....I think this may be lymphoma. Will  get bx as per Dr. Paul, PET, Echo, LDH, uric acid and see her back.   -2/14/2025 uric acid and LDH normal   -2/19/2025 ejection fraction 63.7% GLS -17%  -2/20/2025 PET shows hypermetabolic pancreas primary with lesions throughout the axial and proximal appendicular skeleton, numerous splenic and hepatic lesions, and beata metastases.  -2/21/2025 CT needle biopsy para-aortic node and bone marrow biopsy.Bone marrow pathology unremarkable.  Lymph node biopsy well-differentiated grade 1 neuroendocrine carcinoma Ki-67 2-3%.    -2/28/2025 [facility] oncology follow-up: The patient has pancreatic neuroendocrine tumor grade 1.  However, because I was sniffing out potential lymphoma, I had previously ordered FDG PET and it was unusually abnormal as outlined above for a low-grade PNET.  That has me concerned that there is discordance between the behavior of the tumor and the Ki-67/grade.  I am going to check chromogranin A and pancreatic polypeptide though there is not universal agreement on the usefulness of those for diagnosing managing her following this.  I am going to get a copper dotatate PET and plan to start Sandostatin LAR in a couple of weeks.  However, if the copper dotatate PET does not like this up like would be expected for well-differentiated neuroendocrine tumor of the pancreas, then I would wonder if we should not lead with Sandostatin but consider Capecitabine Temodar or intermediate to that a tyrosine kinase inhibitor or mTOR inhibitor.  I will communicate with Dr. Terell Floyd who is an expert in this area.  I relish his thoughts on the divination as to the true aggressiveness of this and how to proceed.  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 Floyd would like.  There is no ego involved here.       Addendum: I spoke with Dr. Floyd.  As she is not  having pain or any syndromic symptoms, if the copper dotatate PET is positive then 1 could argue for watchful waiting.  Sandostatin analogs could perhaps hold that in check but could also introduce symptoms.  Could consider giving Capecitabine Temodar for 2 or 3 months and repeating the FDG PET and if she gets a rapid response pressed that to maximum cytoreduction and then go to either watchful waiting or maintenance Sandostatin analog.  Ultimately our decision will revolve around the results of her copper dotatate PET.  I will also add her neuron-specific enolase as if that is high that would lean us towards a more aggressive form of PNET.     Primary pancreatic neuroendocrine tumor   2/28/2025 Initial Diagnosis    Primary pancreatic neuroendocrine tumor     4/7/2025 -  Chemotherapy    OP NEUROENDOCRINE Capecitabine / Temozolomide         The current medication list and allergy list were reviewed and reconciled.     Past Medical History, Past Surgical History, Social History, Family History have been reviewed and are without significant changes except as mentioned.    Review of Systems:    Review of Systems   Constitutional:  Negative for appetite change, fatigue, fever and unexpected weight change.   HENT:  Negative for mouth sores, sore throat and trouble swallowing.    Respiratory:  Negative for cough, shortness of breath and wheezing.    Cardiovascular:  Negative for chest pain, palpitations and leg swelling.   Gastrointestinal:  Negative for abdominal distention, abdominal pain, constipation, diarrhea, nausea and vomiting.   Genitourinary:  Negative for difficulty urinating, dysuria and frequency.   Musculoskeletal:  Positive for back pain. Negative for arthralgias.   Skin:  Negative for pallor, rash and wound.   Neurological:  Negative for dizziness and weakness.   Hematological:  Does not bruise/bleed easily.   Psychiatric/Behavioral:  Negative for confusion and sleep disturbance. The patient is not  nervous/anxious.      Physical Exam:    Vitals:    03/27/25 1412   BP: 151/78   Pulse: 70   Temp: 98.6 °F (37 °C)   SpO2: 97%     Vitals:    03/27/25 1412   PainSc: 6    PainLoc: Back          ECOG: (0) Fully Active - Able to Carry On All Pre-disease Performance Without Restriction    General: well appearing, in no acute distress    Psych: Mood is stable            NEEDS ASSESSMENTS    Genetics  The patient's new diagnosis and family history have been reviewed for genetic counseling needs. A genetic referral is not recommended.     Psychosocial  The patient has completed a PHQ-9 Depression Screening and the Distress Thermometer (DT) today.   PHQ-9 results show 0 (No Depression). The patient scored their distress today as 2 on a scale of 0-10 with 0 being no distress and 10 being extreme distress.   Problems marked by the patient as being an issue for them within the last week include physical problems.   Results were reviewed along with psychosocial resources offered by our cancer center.  Our oncology social worker will be flagged for a DT score of 4 or above, and a same day call will be made for a score of 9 or 10.  A mental health referral is offered at this time. The patient is not interested in a referral to [facility].   Copies of patient's questionnaires will be scanned into EMR for details and further reference.    Barriers to care  A barriers form was also completed by the patient today. We discussed services offered by our facility to help her have adequate access to care. The patient was given the name and contact information for our Oncology Social Worker, Loree Long.  Based upon barriers assessment today, the patient will not require a follow-up call from the  to further discuss needs.   A copy of the barriers form will also be scanned into EMR for details and further reference.     VAD Assessment  The patient and I discussed planned intervenous chemotherapy as well as other IV  "treatments that are often needed throughout the course of treatment. These may include, but are not limited to blood transfusions, antibiotics, and IV hydration. The vasculature does appear to be adequate for multiple peripheral IVs throughout their treatment course.     Advanced Care Planning  The patient and I discussed advanced care planning, \"Conversations that Matter\".   This service was offered, free of charge, for development of advance directives with a certified ACP facilitator.  The patient does not have an up-to-date advanced directive. This document is not on file with our office. The patient is not interested in an appointment with one of our facilitators to create or update their advanced directives.      Palliative Care  The patient and I discussed palliative care services. Palliative care is not the same as Hospice care. This is specialized medical care for people living with serious illness with the goal of improving quality of life for the patient and their family. [facility] offers our patients outpatient palliative care early along with their treatment to assist in coordination of care, symptom management, pain management, and medical decision making.  Oncology criteria for palliative care referral is not met at this time. The patient is not interested in a palliative care consultation.     Additional Referral needs  none      CHEMOTHERAPY EDUCATION    Chemotherapy education completed and consent obtained per oncology pharmacist.  See their documentation for further details.    Booklets Given: Chemotherapy and You [x]  Nutrition for the Patient with Cancer During Treatment [x]    Sexuality/Fertility Books []     Chemotherapy Regimen:   Treatment Plans       Name Type Plan Dates Plan Provider         Active    OP NEUROENDOCRINE Capecitabine / Temozolomide ONCOLOGY TREATMENT 3/19/2025 - Present Sam Negrete MD                      Chemotherapy education comprehension reviewed. Questions " answered.      Assessment and Plan:    Diagnoses and all orders for this visit:    1. Primary pancreatic neuroendocrine tumor (Primary)      The patient and I have reviewed their cancer diagnosis and scheduled treatment plan. Needs assessment was completed including genetics, psychosocial needs, barriers to care, VAD evaluation, advanced care planning, and palliative care services. Referrals have been ordered as appropriate based upon our evaluation and patient desires.     Chemotherapy teaching was also completed today per pharmacy.  Adequate time was given to answer questions.  Patient and family are aware of their care team members and contact information if they have questions or problems throughout the treatment course. The patient is adequately prepared to begin treatment as scheduled.     Reviewed with patient education regarding Bactrim, Phenergan and Zofran prescriptions sent to pharmacy.       Patient will have pretreatment labs prior to starting medication on 4/7/2025.  Orders signed.     I spent 30 minutes caring for Arely on this date of service. This time includes time spent by me in the following activities: preparing for the visit, reviewing tests, counseling and educating the patient/family/caregiver, ordering medications, tests, or procedures, referring and communicating with other health care professionals, documenting information in the medical record, and care coordination.     Mandy Yin, APRN  03/27/25

## 2025-03-31 ENCOUNTER — TELEPHONE (OUTPATIENT)
Dept: ONCOLOGY | Facility: CLINIC | Age: 70
End: 2025-03-31
Payer: MEDICARE

## 2025-03-31 NOTE — TELEPHONE ENCOUNTER
[facility]    Patient is having a problem getting Oral Chemo with pharmacy in [place]. She is trying to work through the problem with insurance.   Medicare is saying [place] is still primary which is not the case.  Patient is asking if there is anything we can do?  Phone: [phone]

## 2025-04-07 ENCOUNTER — TELEPHONE (OUTPATIENT)
Dept: ONCOLOGY | Facility: CLINIC | Age: 70
End: 2025-04-07
Payer: MEDICARE

## 2025-04-07 ENCOUNTER — LAB (OUTPATIENT)
Dept: LAB | Facility: HOSPITAL | Age: 70
End: 2025-04-07
Payer: MEDICARE

## 2025-04-07 DIAGNOSIS — Z79.899 ENCOUNTER FOR LONG-TERM (CURRENT) USE OF HIGH-RISK MEDICATION: ICD-10-CM

## 2025-04-07 DIAGNOSIS — D3A.8 PRIMARY PANCREATIC NEUROENDOCRINE TUMOR: ICD-10-CM

## 2025-04-07 LAB
ALBUMIN SERPL-MCNC: 4.2 G/DL (ref 3.5–5.2)
ALBUMIN/GLOB SERPL: 1.6 G/DL
ALP SERPL-CCNC: 110 U/L (ref 39–117)
ALT SERPL W P-5'-P-CCNC: 17 U/L (ref 1–33)
ANION GAP SERPL CALCULATED.3IONS-SCNC: 10 MMOL/L (ref 5–15)
AST SERPL-CCNC: 23 U/L (ref 1–32)
BASOPHILS # BLD AUTO: 0.08 10*3/MM3 (ref 0–0.2)
BASOPHILS NFR BLD AUTO: 1.1 % (ref 0–1.5)
BILIRUB SERPL-MCNC: 0.3 MG/DL (ref 0–1.2)
BUN SERPL-MCNC: 15 MG/DL (ref 8–23)
BUN/CREAT SERPL: 18.3 (ref 7–25)
CALCIUM SPEC-SCNC: 9.4 MG/DL (ref 8.6–10.5)
CHLORIDE SERPL-SCNC: 99 MMOL/L (ref 98–107)
CO2 SERPL-SCNC: 27 MMOL/L (ref 22–29)
CREAT SERPL-MCNC: 0.82 MG/DL (ref 0.57–1)
DEPRECATED RDW RBC AUTO: 42.7 FL (ref 37–54)
EGFRCR SERPLBLD CKD-EPI 2021: 77.5 ML/MIN/1.73
EOSINOPHIL # BLD AUTO: 0.43 10*3/MM3 (ref 0–0.4)
EOSINOPHIL NFR BLD AUTO: 5.9 % (ref 0.3–6.2)
ERYTHROCYTE [DISTWIDTH] IN BLOOD BY AUTOMATED COUNT: 12.9 % (ref 12.3–15.4)
GLOBULIN UR ELPH-MCNC: 2.7 GM/DL
GLUCOSE SERPL-MCNC: 85 MG/DL (ref 65–99)
HCT VFR BLD AUTO: 42.3 % (ref 34–46.6)
HGB BLD-MCNC: 13.6 G/DL (ref 12–15.9)
IMM GRANULOCYTES # BLD AUTO: 0.02 10*3/MM3 (ref 0–0.05)
IMM GRANULOCYTES NFR BLD AUTO: 0.3 % (ref 0–0.5)
LYMPHOCYTES # BLD AUTO: 1.76 10*3/MM3 (ref 0.7–3.1)
LYMPHOCYTES NFR BLD AUTO: 24.3 % (ref 19.6–45.3)
MCH RBC QN AUTO: 29.2 PG (ref 26.6–33)
MCHC RBC AUTO-ENTMCNC: 32.2 G/DL (ref 31.5–35.7)
MCV RBC AUTO: 91 FL (ref 79–97)
MONOCYTES # BLD AUTO: 0.82 10*3/MM3 (ref 0.1–0.9)
MONOCYTES NFR BLD AUTO: 11.3 % (ref 5–12)
NEUTROPHILS NFR BLD AUTO: 4.14 10*3/MM3 (ref 1.7–7)
NEUTROPHILS NFR BLD AUTO: 57.1 % (ref 42.7–76)
NRBC BLD AUTO-RTO: 0 /100 WBC (ref 0–0.2)
PLATELET # BLD AUTO: 294 10*3/MM3 (ref 140–450)
PMV BLD AUTO: 9.9 FL (ref 6–12)
POTASSIUM SERPL-SCNC: 4 MMOL/L (ref 3.5–5.2)
PROT SERPL-MCNC: 6.9 G/DL (ref 6–8.5)
RBC # BLD AUTO: 4.65 10*6/MM3 (ref 3.77–5.28)
SODIUM SERPL-SCNC: 136 MMOL/L (ref 136–145)
WBC NRBC COR # BLD AUTO: 7.25 10*3/MM3 (ref 3.4–10.8)

## 2025-04-07 PROCEDURE — 85025 COMPLETE CBC W/AUTO DIFF WBC: CPT

## 2025-04-07 PROCEDURE — 80053 COMPREHEN METABOLIC PANEL: CPT

## 2025-04-07 PROCEDURE — 36415 COLL VENOUS BLD VENIPUNCTURE: CPT

## 2025-04-07 NOTE — TELEPHONE ENCOUNTER
Results received. RN called patient to let her know that her labs look great and she is good to start her xeloda now. Patient v/u and appreciation.

## 2025-04-07 NOTE — TELEPHONE ENCOUNTER
RN called patient and let her know that her lab results are not back yet. When you have them drawn at [facility], somebody has to pick them up and bring them to the hospital to be resulted, which can cause a delay in results. I will continue to look out for results and call her back if we get the results by the end of day today.

## 2025-04-07 NOTE — TELEPHONE ENCOUNTER
"  Caller: Arely Mesa \"Izzy\"    Relationship: Self    Best call back number: [phone]    What is the best time to reach you: ANYTIME    Who are you requesting to speak with (clinical staff, provider,  specific staff member): LIA VILLALBA         What was the call regarding:     HAD LABS TODAY AND WAS TOLD NOT TO START  ORAL CHEMO UNTIL HEARD BACK REGARDING LABS TODAY , WAS TOLD NURSE NAME LIA WOULD CALL TO LET HER KNOW     THE ORAL CHEMO IS SUPPOSED TO BE TAKEN WITH FOOD TWICE A DAY SO IS HOLDING OFF EATING LUNCH UNTIL HEARS BACK. WANTED TO CHECK ON THIS HAD NOT HEARD ANYTHING YET.     Is it okay if the provider responds through MyChart: YES    "

## 2025-04-18 ENCOUNTER — TELEPHONE (OUTPATIENT)
Dept: OBSTETRICS AND GYNECOLOGY | Facility: CLINIC | Age: 70
End: 2025-04-18
Payer: MEDICARE

## 2025-04-18 NOTE — TELEPHONE ENCOUNTER
Spoke with pt and advised her that the pharmacy or her insurance would need to send us something in order for us to do the PA.  Pt verbalized understanding and had no questions at this time.

## 2025-04-21 RX ORDER — ONDANSETRON 8 MG/1
8 TABLET, ORALLY DISINTEGRATING ORAL EVERY 8 HOURS PRN
Qty: 30 TABLET | Refills: 3 | Status: SHIPPED | OUTPATIENT
Start: 2025-04-21

## 2025-04-23 ENCOUNTER — TELEPHONE (OUTPATIENT)
Dept: ONCOLOGY | Facility: CLINIC | Age: 70
End: 2025-04-23
Payer: MEDICARE

## 2025-04-23 NOTE — TELEPHONE ENCOUNTER
Patient called she has a rash on her torso she thinks it is from the Bactrim, she is going to stop it, and take some Benadryl, and monitor her symptoms. Please call.

## 2025-04-23 NOTE — TELEPHONE ENCOUNTER
Called and discussed the rash. Patient states she has a macular rash on her trunk and feels that it is spreading up her neck. She does not have any problems breathing or swelling. She has taken Benadryl and knows to go to ER if symptoms become worse. I will speak with Dr Negrete tomorrow to see what antibiotic he would like for her to take. She voiced understanding.

## 2025-04-24 DIAGNOSIS — C7A.1 MALIGNANT POORLY DIFFERENTIATED NEUROENDOCRINE CARCINOMA: Primary | ICD-10-CM

## 2025-04-24 DIAGNOSIS — K86.89 PANCREATIC MASS: ICD-10-CM

## 2025-04-24 RX ORDER — DAPSONE 100 MG/1
100 TABLET ORAL DAILY
Qty: 30 TABLET | Refills: 3 | Status: SHIPPED | OUTPATIENT
Start: 2025-04-24

## 2025-04-24 RX ORDER — DAPSONE 100 MG/1
100 TABLET ORAL DAILY
Qty: 30 TABLET | Refills: 3 | Status: CANCELLED | OUTPATIENT
Start: 2025-04-24

## 2025-04-24 NOTE — TELEPHONE ENCOUNTER
Called patient after discussing rash with Dr. Negrete. He prescribed Dapsone 100 mg 1 by mouth daily. She voiced understanding of the change. Script waiting for Dr Negrete to sign.

## 2025-04-28 ENCOUNTER — PRIOR AUTHORIZATION (OUTPATIENT)
Dept: OBSTETRICS AND GYNECOLOGY | Facility: CLINIC | Age: 70
End: 2025-04-28
Payer: MEDICARE

## 2025-05-02 ENCOUNTER — LAB (OUTPATIENT)
Dept: LAB | Facility: HOSPITAL | Age: 70
End: 2025-05-02
Payer: MEDICARE

## 2025-05-02 DIAGNOSIS — Z79.899 ENCOUNTER FOR LONG-TERM (CURRENT) USE OF HIGH-RISK MEDICATION: ICD-10-CM

## 2025-05-02 DIAGNOSIS — D3A.8 PRIMARY PANCREATIC NEUROENDOCRINE TUMOR: ICD-10-CM

## 2025-05-02 LAB
ALBUMIN SERPL-MCNC: 4 G/DL (ref 3.5–5.2)
ALBUMIN/GLOB SERPL: 1.8 G/DL
ALP SERPL-CCNC: 102 U/L (ref 39–117)
ALT SERPL W P-5'-P-CCNC: 59 U/L (ref 1–33)
ANION GAP SERPL CALCULATED.3IONS-SCNC: 11 MMOL/L (ref 5–15)
AST SERPL-CCNC: 53 U/L (ref 1–32)
BASOPHILS # BLD AUTO: 0.01 10*3/MM3 (ref 0–0.2)
BASOPHILS NFR BLD AUTO: 0.2 % (ref 0–1.5)
BILIRUB SERPL-MCNC: 0.4 MG/DL (ref 0–1.2)
BUN SERPL-MCNC: 15 MG/DL (ref 8–23)
BUN/CREAT SERPL: 21.1 (ref 7–25)
CALCIUM SPEC-SCNC: 8.7 MG/DL (ref 8.6–10.5)
CHLORIDE SERPL-SCNC: 97 MMOL/L (ref 98–107)
CO2 SERPL-SCNC: 27 MMOL/L (ref 22–29)
CREAT SERPL-MCNC: 0.71 MG/DL (ref 0.57–1)
DEPRECATED RDW RBC AUTO: 45.7 FL (ref 37–54)
EGFRCR SERPLBLD CKD-EPI 2021: 92.2 ML/MIN/1.73
EOSINOPHIL # BLD AUTO: 0.8 10*3/MM3 (ref 0–0.4)
EOSINOPHIL NFR BLD AUTO: 14.4 % (ref 0.3–6.2)
ERYTHROCYTE [DISTWIDTH] IN BLOOD BY AUTOMATED COUNT: 13.9 % (ref 12.3–15.4)
GLOBULIN UR ELPH-MCNC: 2.2 GM/DL
GLUCOSE SERPL-MCNC: 86 MG/DL (ref 65–99)
HCT VFR BLD AUTO: 39.9 % (ref 34–46.6)
HGB BLD-MCNC: 13 G/DL (ref 12–15.9)
IMM GRANULOCYTES # BLD AUTO: 0.02 10*3/MM3 (ref 0–0.05)
IMM GRANULOCYTES NFR BLD AUTO: 0.4 % (ref 0–0.5)
LYMPHOCYTES # BLD AUTO: 1.43 10*3/MM3 (ref 0.7–3.1)
LYMPHOCYTES NFR BLD AUTO: 25.8 % (ref 19.6–45.3)
MCH RBC QN AUTO: 29.8 PG (ref 26.6–33)
MCHC RBC AUTO-ENTMCNC: 32.6 G/DL (ref 31.5–35.7)
MCV RBC AUTO: 91.5 FL (ref 79–97)
MONOCYTES # BLD AUTO: 0.86 10*3/MM3 (ref 0.1–0.9)
MONOCYTES NFR BLD AUTO: 15.5 % (ref 5–12)
NEUTROPHILS NFR BLD AUTO: 2.42 10*3/MM3 (ref 1.7–7)
NEUTROPHILS NFR BLD AUTO: 43.7 % (ref 42.7–76)
NRBC BLD AUTO-RTO: 0 /100 WBC (ref 0–0.2)
PLATELET # BLD AUTO: 183 10*3/MM3 (ref 140–450)
PMV BLD AUTO: 8.8 FL (ref 6–12)
POTASSIUM SERPL-SCNC: 4.7 MMOL/L (ref 3.5–5.2)
PROT SERPL-MCNC: 6.2 G/DL (ref 6–8.5)
RBC # BLD AUTO: 4.36 10*6/MM3 (ref 3.77–5.28)
SODIUM SERPL-SCNC: 135 MMOL/L (ref 136–145)
WBC NRBC COR # BLD AUTO: 5.54 10*3/MM3 (ref 3.4–10.8)

## 2025-05-02 PROCEDURE — 36415 COLL VENOUS BLD VENIPUNCTURE: CPT

## 2025-05-02 PROCEDURE — 85025 COMPLETE CBC W/AUTO DIFF WBC: CPT

## 2025-05-02 PROCEDURE — 80053 COMPREHEN METABOLIC PANEL: CPT

## 2025-05-05 ENCOUNTER — TELEPHONE (OUTPATIENT)
Dept: ONCOLOGY | Facility: CLINIC | Age: 70
End: 2025-05-05
Payer: MEDICARE

## 2025-05-05 NOTE — TELEPHONE ENCOUNTER
Returned patient call and informed her that I spoke with our pharmacist and we confirmed that the lab results would not indicate a hold or change in her chemotherapy. We also discussed an increased eosinophils percentage but that would not interfere with her receiving treatment. She voiced understanding.

## 2025-05-05 NOTE — TELEPHONE ENCOUNTER
"  Caller: Arely Mesa \"Izzy\"    Relationship: Self    Best call back number: [phone]       Who are you requesting to speak with (clinical staff, provider,  specific staff member): CLINICAL        What was the call regarding: CALLING TO VERIFY IF CBC LABS FROM 5/2/25 SHOW THAT IT IS OK TO BEGIN CHEMO DOSE TODAY  "

## 2025-05-07 ENCOUNTER — OFFICE VISIT (OUTPATIENT)
Dept: ONCOLOGY | Facility: CLINIC | Age: 70
End: 2025-05-07
Payer: MEDICARE

## 2025-05-07 ENCOUNTER — SPECIALTY PHARMACY (OUTPATIENT)
Dept: ONCOLOGY | Facility: HOSPITAL | Age: 70
End: 2025-05-07
Payer: MEDICARE

## 2025-05-07 VITALS
WEIGHT: 132 LBS | RESPIRATION RATE: 16 BRPM | TEMPERATURE: 96.5 F | BODY MASS INDEX: 21.99 KG/M2 | DIASTOLIC BLOOD PRESSURE: 77 MMHG | OXYGEN SATURATION: 98 % | SYSTOLIC BLOOD PRESSURE: 136 MMHG | HEIGHT: 65 IN | HEART RATE: 84 BPM

## 2025-05-07 DIAGNOSIS — D3A.8 PRIMARY PANCREATIC NEUROENDOCRINE TUMOR: ICD-10-CM

## 2025-05-07 DIAGNOSIS — Z79.899 ENCOUNTER FOR LONG-TERM (CURRENT) USE OF HIGH-RISK MEDICATION: Primary | ICD-10-CM

## 2025-05-07 RX ORDER — HYDROCORTISONE ACETATE 25 MG/1
25 SUPPOSITORY RECTAL 2 TIMES DAILY PRN
Qty: 12 EACH | Refills: 0 | Status: SHIPPED | OUTPATIENT
Start: 2025-05-07

## 2025-05-07 RX ORDER — PROMETHAZINE HYDROCHLORIDE 25 MG/1
25 SUPPOSITORY RECTAL EVERY 8 HOURS PRN
Qty: 12 SUPPOSITORY | Refills: 1 | Status: SHIPPED | OUTPATIENT
Start: 2025-05-07

## 2025-05-07 NOTE — PROGRESS NOTES
CHIEF COMPLAINT: Significant nausea with Temodar    Problem List:  Oncology/Hematology History Overview Note   1.  Stage IV well-differentiated pancreatic neuroendocrine tumor clinical [id number] presenting as inpatient with abdominal pain.  CT angiogram chest showed scattered small lung nodules. CT Abd Pelvis and MRI pancreas protocol showed 2.6 cm mass within the body of the pancreas with associated coarse calcification. There is upstream pancreatic ductal dilation with atrophy of the pancreatic tail. Appearance is concerning for primary pancreatic malignancy. Numerous small hypoenhancing masses within the liver and spleen, concerning for metastatic lesions.  Upper abdominal/retroperitoneal lymphadenopathy, including a 3.3 cm mass adjacent to the left adrenal gland. Liver biopsy non diagnostic.  Ca19-9 and cmp and cbc unremarkable.2/20/2025 FDG PET positive for pancreas primary with axial and proximal appendicular skeletal, splenic, hepatic, and beata metastases.  2/21/2025 para aortic node biopsy positive well-differentiated grade 1 neuroendocrine carcinoma Ki-67 2-3%     -2/14/25 [facility] outpatient followup: Here to go over the above inpatient workup. Need definitive answer from tissue. Reviewed with Dr. Whittington re imaging sites to go after. Thinks the L3 level aortocaval node would be high yield. If can't get that then get spleen bx. Thought the yield and safety from that greater than from EUS bx of pancreas/ node. Could be Pratik ag negative pancreas cancer (hence neg Ca19-9) or PNET or.....I think this may be lymphoma. Will get bx as per Dr. Paul, PET, Echo, LDH, uric acid and see her back.   -2/14/2025 uric acid and LDH normal   -2/19/2025 ejection fraction 63.7% GLS -17%  -2/20/2025 PET shows hypermetabolic pancreas primary with lesions throughout the axial and proximal appendicular skeleton, numerous splenic and hepatic lesions, and beata metastases.  -2/21/2025 CT needle biopsy para-aortic node  and bone marrow biopsy.Bone marrow pathology unremarkable.  Lymph node biopsy well-differentiated grade 1 neuroendocrine carcinoma Ki-67 2-3%.    -2/28/2025 [facility] oncology follow-up: The patient has pancreatic neuroendocrine tumor grade 1.  However, because I was sniffing out potential lymphoma, I had previously ordered FDG PET and it was unusually abnormal as outlined above for a low-grade PNET.  That has me concerned that there is discordance between the behavior of the tumor and the Ki-67/grade.  I am going to check chromogranin A and pancreatic polypeptide though there is not universal agreement on the usefulness of those for diagnosing managing her following this.  I am going to get a copper dotatate PET and plan to start Sandostatin LAR in a couple of weeks.  However, if the copper dotatate PET does not like this up like would be expected for well-differentiated neuroendocrine tumor of the pancreas, then I would wonder if we should not lead with Sandostatin but consider Capecitabine Temodar or intermediate to that a tyrosine kinase inhibitor or mTOR inhibitor.  I will communicate with Dr. Terell Floyd who is an expert in this area.  I relish his thoughts on the divination as to the true aggressiveness of this and how to proceed.  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 Floyd would like.  There is no ego involved here.       Addendum: I spoke with Dr. Floyd.  As she is not having pain or any syndromic symptoms, if the copper dotatate PET is positive then 1 could argue for watchful waiting.  Sandostatin analogs could perhaps hold that in check but could also introduce symptoms.  Could consider giving Capecitabine Temodar for 2 or 3 months and repeating the FDG PET and if she gets a rapid response pressed that to maximum cytoreduction and then go to  either watchful waiting or maintenance Sandostatin analog.  Ultimately our decision will revolve around the results of her copper dotatate PET.  I will also add her neuron-specific enolase as if that is high that would lean us towards a more aggressive form of PNET.    - 3/24/2025 [facility] follow-up: I reviewed CAT scans today with Dr. Greene CT chest abdomen pelvis showing her known liver and splenic and retroperitoneal adenopathy involvement.  Her FDG PET and copper dotatate PET, the latter performed by Dr. Floyd, show an array of abnormalities ranging from low-grade to intermediate grade to high-grade.  2/28/2025 neuron-specific enolase is elevated at 28 with a chromogranin A of 272 and a normal pancreatic polypeptide.  UK reviewed the lymph node that showed well-differentiated neuroendocrine carcinoma.  Per discussion with Dr. Floyd, given the fact that the FDG PET in the neuron-specific enolase suggests a significant high-grade component not just the well-differentiated low-grade component, he recommends 2 or 3 months of Capecitabine Temodar and then repeating the FDG PET and then pressed to maximum cytoreduction if getting a good response and then either go to watchful waiting or maintenance Sandostatin analog.  We will get her educated and start Capecitabine Temodar.  Side effects discussed in detail with the patient and she will have her cancer treatment preparation visit later this week.  She has a trip for a meeting to [place] that she wants to go to that would put her towards her corazon if we started on 31 March so we will move that out a week and she will get labs that day and then see my nurse practitioner in my absence on May 5 for cycle 2.  She will keep us appraised of her tolerance.  After 2 to 3 months we would repeat imaging of FDG PET and then treat to maximum cytoreduction.  According to the patient, Dr. Floyd plans on seeing the patient back in June and he left her with the  impression that she may be on Capecitabine Temodar for the better part of a year.  I will follow his lead on that as well as when to switch or fold in lanreotide versus continued watchful waiting at that junction.     Primary pancreatic neuroendocrine tumor   2025 Initial Diagnosis    Primary pancreatic neuroendocrine tumor     2025 -  Chemotherapy    OP NEUROENDOCRINE Capecitabine / Temozolomide       HISTORY OF PRESENT ILLNESS:  The patient is a 69 y.o. female, here for follow up on management of pancreatic neuroendocrine carcinoma.  She completed her first cycle of Xeloda and Temodar.  She had significant nausea with the Temodar that lasted 3-4 days.  She managed with Zofran and Phenergan as needed.  She did not have any vomiting.  Difficulty eating during that time and lost several pounds which she has gained back.  She had constipation that was controlled with fiber and prune juice.  She had issues with hemorrhoids and asked for a refill of Anusol HC.  She has had issues with sciatic pain radiating to her left ankle ever since having PET scan.  She has done physical therapy without any relief.  She had MRI that showed moderate spinal stenosis at L4-5 with normal foraminal impingement.  Planning for epidural injection with steroids at some point.    Past Medical History:   Diagnosis Date    Asthma     Fibroids     Genital HSV     Hypercholesteremia 2008    Off meds ~     Hypothyroid 2003    Meniere disease, right along with tinnitus     Tubular adenoma of colon 2015    f/u scope 2018 normal    Tubular adenoma of colon 2023     Past Surgical History:   Procedure Laterality Date     SECTION  1985    ORIF PATELLA FRACTURE Left 2018    RHINOPLASTY  1990    TONSILLECTOMY  1964    TRIGGER FINGER RELEASE Left 2013    index finger       Allergies   Allergen Reactions    Sulfa Antibiotics Itching and Rash    Hydrocodone-Acetaminophen Nausea And Vomiting     Family  "History and Social History reviewed and changed as necessary    REVIEW OF SYSTEM:   Significant nausea with Temodar  Constipation  Sciatic pain to left ankle     PHYSICAL EXAM:  Appears well, no acute distress  Heart rate rhythm regular  Lungs clear bilaterally, respirations even unlabored  No jaundice pallor or icterus    Vitals:    05/07/25 0830   BP: 136/77   Pulse: 84   Resp: 16   Temp: 96.5 °F (35.8 °C)   TempSrc: Temporal   SpO2: 98%   Weight: 59.9 kg (132 lb)   Height: 165.1 cm (65\")     Vitals:    05/07/25 0830   PainSc: 0-No pain        ECOG score: 0         Vitals reviewed.    ECOG: (0) Fully Active - Able to Carry On All Pre-disease Performance Without Restriction    Lab Results   Component Value Date    HGB 13.0 05/02/2025    HCT 39.9 05/02/2025    MCV 91.5 05/02/2025     05/02/2025    WBC 5.54 05/02/2025    NEUTROABS 2.42 05/02/2025    LYMPHSABS 1.43 05/02/2025    MONOSABS 0.86 05/02/2025    EOSABS 0.80 (H) 05/02/2025    BASOSABS 0.01 05/02/2025       Lab Results   Component Value Date    GLUCOSE 86 05/02/2025    BUN 15 05/02/2025    CREATININE 0.71 05/02/2025     (L) 05/02/2025    K 4.7 05/02/2025    CL 97 (L) 05/02/2025    CO2 27.0 05/02/2025    CALCIUM 8.7 05/02/2025    PROTEINTOT 6.2 05/02/2025    ALBUMIN 4.0 05/02/2025    BILITOT 0.4 05/02/2025    ALKPHOS 102 05/02/2025    AST 53 (H) 05/02/2025    ALT 59 (H) 05/02/2025             ASSESSMENT & PLAN:  1.  Stage IV well-differentiated pancreatic neuroendocrine tumor clinical [id number] presenting as inpatient with abdominal pain.  CT angiogram chest showed scattered small lung nodules. CT Abd Pelvis and MRI pancreas protocol showed 2.6 cm mass within the body of the pancreas with associated coarse calcification. There is upstream pancreatic ductal dilation with atrophy of the pancreatic tail. Appearance is concerning for primary pancreatic malignancy. Numerous small hypoenhancing masses within the liver and spleen, concerning for metastatic " lesions.  Upper abdominal/retroperitoneal lymphadenopathy, including a 3.3 cm mass adjacent to the left adrenal gland. Liver biopsy non diagnostic.  Ca19-9 and cmp and cbc unremarkable.2/20/2025 FDG PET positive for pancreas primary with axial and proximal appendicular skeletal, splenic, hepatic, and beata metastases.  2/21/2025 para aortic node biopsy positive well-differentiated grade 1 neuroendocrine carcinoma Ki-67 2-3%     -2/14/25 [facility] outpatient followup: Here to go over the above inpatient workup. Need definitive answer from tissue. Reviewed with Dr. Whittington re imaging sites to go after. Thinks the L3 level aortocaval node would be high yield. If can't get that then get spleen bx. Thought the yield and safety from that greater than from EUS bx of pancreas/ node. Could be Pratik ag negative pancreas cancer (hence neg Ca19-9) or PNET or.....I think this may be lymphoma. Will get bx as per Dr. Paul, PET, Echo, LDH, uric acid and see her back.   -2/14/2025 uric acid and LDH normal   -2/19/2025 ejection fraction 63.7% GLS -17%  -2/20/2025 PET shows hypermetabolic pancreas primary with lesions throughout the axial and proximal appendicular skeleton, numerous splenic and hepatic lesions, and beata metastases.  -2/21/2025 CT needle biopsy para-aortic node and bone marrow biopsy.Bone marrow pathology unremarkable.  Lymph node biopsy well-differentiated grade 1 neuroendocrine carcinoma Ki-67 2-3%.    -2/28/2025 [facility] medical oncology follow-up: The patient has pancreatic neuroendocrine tumor grade 1.  However, because I was sniffing out potential lymphoma, I had previously ordered FDG PET and it was unusually abnormal as outlined above for a low-grade PNET.  That has me concerned that there is discordance between the behavior of the tumor and the Ki-67/grade.  I am going to check chromogranin A and pancreatic polypeptide though there is not universal agreement on the usefulness of those for diagnosing  managing her following this.  I am going to get a copper dotatate PET and plan to start Sandostatin LAR in a couple of weeks.  However, if the copper dotatate PET does not like this up like would be expected for well-differentiated neuroendocrine tumor of the pancreas, then I would wonder if we should not lead with Sandostatin but consider Capecitabine Temodar or intermediate to that a tyrosine kinase inhibitor or mTOR inhibitor.  I will communicate with Dr. Terell Floyd who is an expert in this area.  I relish his thoughts on the divination as to the true aggressiveness of this and how to proceed.  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 Floyd would like.  There is no ego involved here.       Addendum: I spoke with Dr. Floyd.  As she is not having pain or any syndromic symptoms, if the copper dotatate PET is positive then 1 could argue for watchful waiting.  Sandostatin analogs could perhaps hold that in check but could also introduce symptoms.  Could consider giving Capecitabine Temodar for 2 or 3 months and repeating the FDG PET and if she gets a rapid response pressed that to maximum cytoreduction and then go to either watchful waiting or maintenance Sandostatin analog.  Ultimately our decision will revolve around the results of her copper dotatate PET.  I will also add her neuron-specific enolase as if that is high that would lean us towards a more aggressive form of PNET.    -3/12/2025 copper dotatate PET  indicates multiple SSA avid hypodense lesions right Howard liver 1.5 cm SUV 19.2 0.8 cm SUV 12.8.  Numerous additional subtle foci avidity in the liver without measurable CT correlate.  Heterogeneous SSA avid spleen SUV 16.1 with no distinct lesions within the spleen on CT with marked FDG avidity correlating to numerous hypoenhancing lesions on outside CT.   Partially calcified SSA avid mass within the pancreatic body 3.2 cm SUV 26.1.  Photopenic left para-aortic mass 2.7 cm which may represent necrosis.  Paracolic superior mesenteric node 0.7 cm SUV 25.2.  Alec hepatic node 0.9 cm SUV 32.  Superior mesenteric/aortocaval central node conglomerate 1.7 cm SUV 33.2.  Several retroperitoneal nodes with intense SSA avidity without FDG avidity.  Additional alec hepatic nodes without SSA avidity with intense FDG avidity.    - 3/24/2025 [facility] follow-up: I reviewed CAT scans today with Dr. Greene CT chest abdomen pelvis showing her known liver and splenic and retroperitoneal adenopathy involvement.  Her FDG PET and copper dotatate PET, the latter performed by Dr. Floyd, show an array of abnormalities ranging from low-grade to intermediate grade to high-grade.  2/28/2025 neuron-specific enolase is elevated at 28 with a chromogranin A of 272 and a normal pancreatic polypeptide.  UK reviewed the lymph node that showed well-differentiated neuroendocrine carcinoma.  Per discussion with Dr. Floyd, given the fact that the FDG PET in the neuron-specific enolase suggests a significant high-grade component not just the well-differentiated low-grade component, he recommends 2 or 3 months of Capecitabine Temodar and then repeating the FDG PET and then pressed to maximum cytoreduction if getting a good response and then either go to watchful waiting or maintenance Sandostatin analog.  We will get her educated and start Capecitabine Temodar.  Side effects discussed in detail with the patient and she will have her cancer treatment preparation visit later this week.  She has a trip for a meeting to [place] that she wants to go to that would put her towards her corazon if we started on 31 March so we will move that out a week and she will get labs that day and then see my nurse practitioner in my absence on May 5 for cycle 2.  She will keep us appraised of her tolerance.  After  2 to 3 months we would repeat imaging of FDG PET and then treat to maximum cytoreduction.  According to the patient, Dr. Floyd plans on seeing the patient back in June and he left her with the impression that she may be on Capecitabine Temodar for the better part of a year.  I will follow his lead on that as well as when to switch or fold in lanreotide versus continued watchful waiting at that junction.    -5/7/2025 [facility] oncology APRN visit: Patient completed first cycle of capecitabine Temodar.  She tolerated it fairly well but did have significant nausea with the Temodar.  She managed with Zofran and Phenergan as needed.  She is asking for Phenergan suppositories which I have sent in.  She had issues with constipation that is now controlled with fiber and prune juice.  She had a flareup of her hemorrhoids that are internal and is requesting Anusol HC suppository which I have sent in for her.  I reviewed her labs from 5/2/2025 that are adequate for treatment.  White count, hemoglobin, platelets and ANC are normal.  Liver enzymes slightly elevated with AST 53 and ALT 59.  Sodium 135, otherwise CMP unremarkable.  She is having sciatica pain on her left side down to her ankle that has been ongoing since her PET scan.  She has done physical therapy without much relief.  She had an MRI that showed spinal stenosis with neuroforaminal impingement.  She is planning to get epidural steroid injection at some point.  We will continue treatment without any changes, she did start her Xeloda back on 5/5/2025.  She will notify us if nausea is unmanageable with the Temodar.  Otherwise we will see her back on 5/30/2025 prior to starting cycle 3 on 6/2/2025 at which [place].    Total time of patient care on day of service including time prior to, face to face with patient, and following visit spent in reviewing records, lab results, imaging studies, discussion with patient, and  documentation/charting was > 30 minutes.     Mandy Yin, APRN  05/07/2025

## 2025-05-15 ENCOUNTER — TELEPHONE (OUTPATIENT)
Dept: ONCOLOGY | Facility: CLINIC | Age: 70
End: 2025-05-15
Payer: MEDICARE

## 2025-05-15 NOTE — TELEPHONE ENCOUNTER
Called patient and confirmed she didn't start Temodar last night. She had an epidural placed yesterday for back pain and she was very nauseous and was vomiting last night. She called and spoke to Dr. Mancini who told her to to hold the Temodar last night until the vomiting was resolved. Patient reports she feels better today and will probably take a Zofran or Phenergan prophylactically before she takes the Temodar tonight. Mandy was notified and suggested the patient just add the Temodar to Monday instead of skipping altogether. Patient was notified and voiced understanding.

## 2025-05-19 ENCOUNTER — HOSPITAL ENCOUNTER (OUTPATIENT)
Dept: ONCOLOGY | Facility: HOSPITAL | Age: 70
Discharge: HOME | End: 2025-05-19
Payer: MEDICARE

## 2025-05-19 ENCOUNTER — HOSPITAL ENCOUNTER (INPATIENT)
Facility: HOSPITAL | Age: 70
LOS: 1 days | Discharge: HOME | End: 2025-05-21
Attending: INTERNAL MEDICINE | Admitting: INTERNAL MEDICINE
Payer: MEDICARE

## 2025-05-19 ENCOUNTER — TELEPHONE (OUTPATIENT)
Dept: ONCOLOGY | Facility: CLINIC | Age: 70
End: 2025-05-19
Payer: MEDICARE

## 2025-05-19 VITALS
OXYGEN SATURATION: 96 % | TEMPERATURE: 97.4 F | DIASTOLIC BLOOD PRESSURE: 68 MMHG | RESPIRATION RATE: 16 BRPM | BODY MASS INDEX: 22.16 KG/M2 | HEIGHT: 65 IN | SYSTOLIC BLOOD PRESSURE: 109 MMHG | HEART RATE: 111 BPM | WEIGHT: 133 LBS

## 2025-05-19 DIAGNOSIS — Z79.899 ENCOUNTER FOR LONG-TERM (CURRENT) USE OF HIGH-RISK MEDICATION: ICD-10-CM

## 2025-05-19 DIAGNOSIS — C7A.1 MALIGNANT POORLY DIFFERENTIATED NEUROENDOCRINE CARCINOMA: Primary | ICD-10-CM

## 2025-05-19 DIAGNOSIS — D3A.8 PRIMARY PANCREATIC NEUROENDOCRINE TUMOR: ICD-10-CM

## 2025-05-19 PROBLEM — R00.0 TACHYCARDIA: Status: ACTIVE | Noted: 2025-05-19

## 2025-05-19 PROBLEM — M54.50 CHRONIC LOW BACK PAIN: Status: ACTIVE | Noted: 2025-05-19

## 2025-05-19 PROBLEM — G89.29 CHRONIC LOW BACK PAIN: Status: ACTIVE | Noted: 2025-05-19

## 2025-05-19 PROBLEM — R11.2 NAUSEA AND VOMITING: Status: ACTIVE | Noted: 2025-05-19

## 2025-05-19 PROBLEM — J45.909 ASTHMA: Status: ACTIVE | Noted: 2025-05-19

## 2025-05-19 PROBLEM — I47.10 SVT (SUPRAVENTRICULAR TACHYCARDIA): Status: ACTIVE | Noted: 2025-05-19

## 2025-05-19 LAB
ALBUMIN SERPL-MCNC: 4.1 G/DL (ref 3.5–5.2)
ALBUMIN/GLOB SERPL: 1.8 G/DL
ALP SERPL-CCNC: 128 U/L (ref 39–117)
ALT SERPL W P-5'-P-CCNC: 39 U/L (ref 1–33)
ANION GAP SERPL CALCULATED.3IONS-SCNC: 14 MMOL/L (ref 5–15)
AST SERPL-CCNC: 36 U/L (ref 1–32)
BASOPHILS # BLD AUTO: 0.01 10*3/MM3 (ref 0–0.2)
BASOPHILS NFR BLD AUTO: 0.3 % (ref 0–1.5)
BILIRUB SERPL-MCNC: 0.7 MG/DL (ref 0–1.2)
BUN SERPL-MCNC: 14 MG/DL (ref 8–23)
BUN/CREAT SERPL: 17.3 (ref 7–25)
CALCIUM SPEC-SCNC: 8.3 MG/DL (ref 8.6–10.5)
CHLORIDE SERPL-SCNC: 95 MMOL/L (ref 98–107)
CO2 SERPL-SCNC: 22 MMOL/L (ref 22–29)
CREAT SERPL-MCNC: 0.81 MG/DL (ref 0.57–1)
DEPRECATED RDW RBC AUTO: 46.6 FL (ref 37–54)
EGFRCR SERPLBLD CKD-EPI 2021: 78.7 ML/MIN/1.73
EOSINOPHIL # BLD AUTO: 0.07 10*3/MM3 (ref 0–0.4)
EOSINOPHIL NFR BLD AUTO: 2.3 % (ref 0.3–6.2)
ERYTHROCYTE [DISTWIDTH] IN BLOOD BY AUTOMATED COUNT: 14.6 % (ref 12.3–15.4)
GLOBULIN UR ELPH-MCNC: 2.3 GM/DL
GLUCOSE SERPL-MCNC: 118 MG/DL (ref 65–99)
HCT VFR BLD AUTO: 30.5 % (ref 34–46.6)
HGB BLD-MCNC: 10.3 G/DL (ref 12–15.9)
IMM GRANULOCYTES # BLD AUTO: 0.01 10*3/MM3 (ref 0–0.05)
IMM GRANULOCYTES NFR BLD AUTO: 0.3 % (ref 0–0.5)
LYMPHOCYTES # BLD AUTO: 0.7 10*3/MM3 (ref 0.7–3.1)
LYMPHOCYTES NFR BLD AUTO: 23.3 % (ref 19.6–45.3)
MCH RBC QN AUTO: 31 PG (ref 26.6–33)
MCHC RBC AUTO-ENTMCNC: 33.8 G/DL (ref 31.5–35.7)
MCV RBC AUTO: 91.9 FL (ref 79–97)
MONOCYTES # BLD AUTO: 0.26 10*3/MM3 (ref 0.1–0.9)
MONOCYTES NFR BLD AUTO: 8.7 % (ref 5–12)
NEUTROPHILS NFR BLD AUTO: 1.95 10*3/MM3 (ref 1.7–7)
NEUTROPHILS NFR BLD AUTO: 65.1 % (ref 42.7–76)
PLATELET # BLD AUTO: 259 10*3/MM3 (ref 140–450)
PMV BLD AUTO: 8.8 FL (ref 6–12)
POTASSIUM SERPL-SCNC: 4.3 MMOL/L (ref 3.5–5.2)
PROT SERPL-MCNC: 6.4 G/DL (ref 6–8.5)
RBC # BLD AUTO: 3.32 10*6/MM3 (ref 3.77–5.28)
SODIUM SERPL-SCNC: 131 MMOL/L (ref 136–145)
WBC NRBC COR # BLD AUTO: 3 10*3/MM3 (ref 3.4–10.8)

## 2025-05-19 PROCEDURE — 93005 ELECTROCARDIOGRAM TRACING: CPT | Performed by: NURSE PRACTITIONER

## 2025-05-19 PROCEDURE — 96361 HYDRATE IV INFUSION ADD-ON: CPT

## 2025-05-19 PROCEDURE — 25810000003 SODIUM CHLORIDE 0.9 % SOLUTION: Performed by: NURSE PRACTITIONER

## 2025-05-19 PROCEDURE — 25810000003 SODIUM CHLORIDE 0.9 % SOLUTION: Performed by: INTERNAL MEDICINE

## 2025-05-19 PROCEDURE — 25010000002 ENOXAPARIN PER 10 MG: Performed by: NURSE PRACTITIONER

## 2025-05-19 PROCEDURE — 85025 COMPLETE CBC W/AUTO DIFF WBC: CPT | Performed by: INTERNAL MEDICINE

## 2025-05-19 PROCEDURE — G0378 HOSPITAL OBSERVATION PER HR: HCPCS

## 2025-05-19 PROCEDURE — 25010000002 DEXAMETHASONE PER 1 MG: Performed by: INTERNAL MEDICINE

## 2025-05-19 PROCEDURE — 96374 THER/PROPH/DIAG INJ IV PUSH: CPT

## 2025-05-19 PROCEDURE — 93010 ELECTROCARDIOGRAM REPORT: CPT | Performed by: STUDENT IN AN ORGANIZED HEALTH CARE EDUCATION/TRAINING PROGRAM

## 2025-05-19 PROCEDURE — 99222 1ST HOSP IP/OBS MODERATE 55: CPT | Performed by: NURSE PRACTITIONER

## 2025-05-19 PROCEDURE — 96375 TX/PRO/DX INJ NEW DRUG ADDON: CPT

## 2025-05-19 PROCEDURE — 25010000002 ONDANSETRON PER 1 MG: Performed by: INTERNAL MEDICINE

## 2025-05-19 PROCEDURE — 80053 COMPREHEN METABOLIC PANEL: CPT | Performed by: INTERNAL MEDICINE

## 2025-05-19 RX ORDER — LEVOTHYROXINE SODIUM 75 UG/1
75 TABLET ORAL DAILY
Status: DISCONTINUED | OUTPATIENT
Start: 2025-05-19 | End: 2025-05-21 | Stop reason: HOSPADM

## 2025-05-19 RX ORDER — DAPSONE 100 MG/1
100 TABLET ORAL NIGHTLY
Status: DISCONTINUED | OUTPATIENT
Start: 2025-05-19 | End: 2025-05-21 | Stop reason: HOSPADM

## 2025-05-19 RX ORDER — OXYBUTYNIN CHLORIDE 5 MG/1
5 TABLET, EXTENDED RELEASE ORAL DAILY
Status: DISCONTINUED | OUTPATIENT
Start: 2025-05-19 | End: 2025-05-21 | Stop reason: HOSPADM

## 2025-05-19 RX ORDER — ONDANSETRON 2 MG/ML
8 INJECTION INTRAMUSCULAR; INTRAVENOUS ONCE
Status: COMPLETED | OUTPATIENT
Start: 2025-05-19 | End: 2025-05-19

## 2025-05-19 RX ORDER — SODIUM CHLORIDE 9 MG/ML
40 INJECTION, SOLUTION INTRAVENOUS AS NEEDED
Status: DISCONTINUED | OUTPATIENT
Start: 2025-05-19 | End: 2025-05-21 | Stop reason: HOSPADM

## 2025-05-19 RX ORDER — DEXAMETHASONE SODIUM PHOSPHATE 4 MG/ML
4 INJECTION, SOLUTION INTRA-ARTICULAR; INTRALESIONAL; INTRAMUSCULAR; INTRAVENOUS; SOFT TISSUE EVERY 6 HOURS PRN
Status: DISCONTINUED | OUTPATIENT
Start: 2025-05-19 | End: 2025-05-21 | Stop reason: HOSPADM

## 2025-05-19 RX ORDER — HYDROCORTISONE ACETATE 25 MG/1
25 SUPPOSITORY RECTAL 2 TIMES DAILY PRN
Status: DISCONTINUED | OUTPATIENT
Start: 2025-05-19 | End: 2025-05-21 | Stop reason: HOSPADM

## 2025-05-19 RX ORDER — FLUTICASONE PROPIONATE 50 MCG
2 SPRAY, SUSPENSION (ML) NASAL DAILY
Status: DISCONTINUED | OUTPATIENT
Start: 2025-05-19 | End: 2025-05-21 | Stop reason: HOSPADM

## 2025-05-19 RX ORDER — SODIUM CHLORIDE 9 MG/ML
100 INJECTION, SOLUTION INTRAVENOUS CONTINUOUS
Status: ACTIVE | OUTPATIENT
Start: 2025-05-19 | End: 2025-05-20

## 2025-05-19 RX ORDER — ONDANSETRON 2 MG/ML
8 INJECTION INTRAMUSCULAR; INTRAVENOUS ONCE
Status: CANCELLED | OUTPATIENT
Start: 2025-05-19

## 2025-05-19 RX ORDER — BISACODYL 5 MG/1
5 TABLET, DELAYED RELEASE ORAL DAILY PRN
Status: DISCONTINUED | OUTPATIENT
Start: 2025-05-19 | End: 2025-05-21 | Stop reason: HOSPADM

## 2025-05-19 RX ORDER — SODIUM CHLORIDE 0.9 % (FLUSH) 0.9 %
10 SYRINGE (ML) INJECTION EVERY 12 HOURS SCHEDULED
Status: DISCONTINUED | OUTPATIENT
Start: 2025-05-19 | End: 2025-05-21 | Stop reason: HOSPADM

## 2025-05-19 RX ORDER — ENOXAPARIN SODIUM 100 MG/ML
40 INJECTION SUBCUTANEOUS DAILY
Status: DISCONTINUED | OUTPATIENT
Start: 2025-05-19 | End: 2025-05-21 | Stop reason: HOSPADM

## 2025-05-19 RX ORDER — BISACODYL 10 MG
10 SUPPOSITORY, RECTAL RECTAL DAILY PRN
Status: DISCONTINUED | OUTPATIENT
Start: 2025-05-19 | End: 2025-05-21 | Stop reason: HOSPADM

## 2025-05-19 RX ORDER — OXYCODONE AND ACETAMINOPHEN 5; 325 MG/1; MG/1
1 TABLET ORAL EVERY 6 HOURS PRN
Refills: 0 | Status: DISCONTINUED | OUTPATIENT
Start: 2025-05-19 | End: 2025-05-21 | Stop reason: HOSPADM

## 2025-05-19 RX ORDER — FAMOTIDINE 20 MG/1
40 TABLET, FILM COATED ORAL DAILY
Status: DISCONTINUED | OUTPATIENT
Start: 2025-05-19 | End: 2025-05-21 | Stop reason: HOSPADM

## 2025-05-19 RX ORDER — CYCLOBENZAPRINE HCL 10 MG
10 TABLET ORAL 3 TIMES DAILY PRN
Status: DISCONTINUED | OUTPATIENT
Start: 2025-05-19 | End: 2025-05-21 | Stop reason: HOSPADM

## 2025-05-19 RX ORDER — AMOXICILLIN 250 MG
2 CAPSULE ORAL 2 TIMES DAILY
Status: DISCONTINUED | OUTPATIENT
Start: 2025-05-19 | End: 2025-05-21 | Stop reason: HOSPADM

## 2025-05-19 RX ORDER — ACETAMINOPHEN 325 MG/1
650 TABLET ORAL EVERY 4 HOURS PRN
Status: DISCONTINUED | OUTPATIENT
Start: 2025-05-19 | End: 2025-05-21 | Stop reason: HOSPADM

## 2025-05-19 RX ORDER — SODIUM CHLORIDE 0.9 % (FLUSH) 0.9 %
10 SYRINGE (ML) INJECTION AS NEEDED
Status: DISCONTINUED | OUTPATIENT
Start: 2025-05-19 | End: 2025-05-21 | Stop reason: HOSPADM

## 2025-05-19 RX ORDER — MONTELUKAST SODIUM 10 MG/1
10 TABLET ORAL NIGHTLY
Status: DISCONTINUED | OUTPATIENT
Start: 2025-05-19 | End: 2025-05-21 | Stop reason: HOSPADM

## 2025-05-19 RX ORDER — CETIRIZINE HYDROCHLORIDE 10 MG/1
10 TABLET ORAL DAILY
Status: DISCONTINUED | OUTPATIENT
Start: 2025-05-19 | End: 2025-05-21 | Stop reason: HOSPADM

## 2025-05-19 RX ORDER — POLYETHYLENE GLYCOL 3350 17 G/17G
17 POWDER, FOR SOLUTION ORAL DAILY PRN
Status: DISCONTINUED | OUTPATIENT
Start: 2025-05-19 | End: 2025-05-21 | Stop reason: HOSPADM

## 2025-05-19 RX ADMIN — Medication 10 MG: at 20:00

## 2025-05-19 RX ADMIN — Medication 10 ML: at 20:00

## 2025-05-19 RX ADMIN — ENOXAPARIN SODIUM 40 MG: 100 INJECTION SUBCUTANEOUS at 17:22

## 2025-05-19 RX ADMIN — DEXAMETHASONE SODIUM PHOSPHATE 10 MG: 10 INJECTION INTRAMUSCULAR; INTRAVENOUS at 13:31

## 2025-05-19 RX ADMIN — LEVOTHYROXINE SODIUM 75 MCG: 0.07 TABLET ORAL at 17:22

## 2025-05-19 RX ADMIN — CETIRIZINE HYDROCHLORIDE 10 MG: 10 TABLET, FILM COATED ORAL at 17:22

## 2025-05-19 RX ADMIN — SODIUM CHLORIDE 100 ML/HR: 9 INJECTION, SOLUTION INTRAVENOUS at 17:23

## 2025-05-19 RX ADMIN — MONTELUKAST 10 MG: 10 TABLET, FILM COATED ORAL at 20:00

## 2025-05-19 RX ADMIN — SODIUM CHLORIDE 1000 ML: 900 INJECTION, SOLUTION INTRAVENOUS at 13:20

## 2025-05-19 RX ADMIN — ONDANSETRON 8 MG: 2 INJECTION INTRAMUSCULAR; INTRAVENOUS at 13:23

## 2025-05-19 RX ADMIN — DAPSONE 100 MG: 100 TABLET ORAL at 23:04

## 2025-05-19 RX ADMIN — SENNOSIDES, DOCUSATE SODIUM 2 TABLET: 50; 8.6 TABLET, FILM COATED ORAL at 20:00

## 2025-05-19 RX ADMIN — FAMOTIDINE 40 MG: 20 TABLET, FILM COATED ORAL at 17:22

## 2025-05-19 NOTE — H&P
[facility]   HISTORY AND PHYSICAL    Patient Name: Arely Mesa  : 1955  MRN: [id number]  Primary Care Physician:  Mohini Baeza MD  Date of admission: 2025    Subjective   Subjective     Chief Complaint: Dizziness    History of Present Illness  History of Present Illness  Reason for Admit: Persistent dizziness and elevated heart rate.    The patient has been experiencing persistent lightheadedness since Friday, particularly upon standing. Her heart rate, typically around 70, has not dropped below 90, and it escalates to approximately 130 during activities such as ascending stairs. She reports a sensation of her heart racing and experiences dizziness upon standing, which only subsides when she lies down. She is not experiencing any chest pressure or pain. Her blood pressure readings at home have been stable. Despite receiving medication and fluids in the oncology office today, she reports no improvement in her symptoms. She continues to experience tachycardia even while seated. Her symptoms have remained consistent over the weekend, with no reported exacerbation. She has not experienced any falls or syncope related to her current symptoms. She reports a decrease in appetite due to her overall unwell feeling but has been maintaining her fluid intake. Her urinary output remains normal. She reports no diarrhea but notes that her irritable bowel syndrome flares up when her food intake is reduced. She is uncertain about her last bowel movement, recalling it may have been on Friday or Saturday morning. She reports no abdominal pain. She reports no recent episodes of vomiting, with the last occurrences being on Wednesday and Thursday nights. She experienced severe nausea during her first chemotherapy cycle, which improved with medication    . She has been undergoing oral chemotherapy for stage IV pancreatic neuroendocrine tumor, initiated earlier this year, and recently completed a 14-day cycle of  Xeloda and Temodar (cycle 2).  She experienced significant nausea during the first cycle but did not report any cardiac symptoms. Her nausea is currently well-managed. She had an epidural on Wednesday due to suspected pinched nerves, which resulted in severe vomiting that night. She was advised to discontinue Temodar that night and resume it on Thursday, after which the vomiting ceased. However, she experienced an episode of vomiting upon waking from sleep. On Friday, she noticed an increase in her basal heart rate by 20 points, as indicated by her Apple watch. She reports occasional shortness of breath, which she attributes to anxiety. She and overall diminished ability to complete ADLs and typical ambulation due to dizziness and palpitations.    She has hypothyroidism and hypercholesterolemia, which increased after menopause. She has severe allergies and exercise-induced asthma, which is well-controlled as long as she avoids playing tennis in cold weather.    SOCIAL HISTORY  She does not smoke. She usually consumes one or two alcoholic drinks a week but has not been drinking at all recently.    FAMILY HISTORY  Her father had a couple of heart attacks. Her mother had uterine cancer and hypothyroidism. Both grandfathers  of heart attacks. A grandmother  of pancreatic cancer.    MEDICATIONS  Current: Xeloda, Temodar, Zofran, dexamethasone    Review of Systems     Personal History     Past Medical History:   Diagnosis Date    Asthma     Fibroids     Genital HSV     Hypercholesteremia 2008    Off meds ~     Hypothyroid 2003    Meniere disease, right along with tinnitus     Tubular adenoma of colon 2015    f/u scope 2018 normal    Tubular adenoma of colon 2023       Past Surgical History:   Procedure Laterality Date     SECTION  1985    ORIF PATELLA FRACTURE Left 2018    RHINOPLASTY  1990    TONSILLECTOMY  1964    TRIGGER FINGER RELEASE Left 2013    index finger        Family History: family history includes Pancreatic cancer in her paternal grandmother; Uterine cancer in her mother. Otherwise pertinent FHx was reviewed and not pertinent to current issue.    Social History:  reports that she has never smoked. She does not have any smokeless tobacco history on file. She reports current alcohol use of about 2.0 standard drinks of alcohol per week. She reports that she does not use drugs.    Home Medications:  Calcium Citrate-Vitamin D3, Conj Estrogens-Bazedoxifene, Mirabegron ER, Turmeric, capecitabine, cetirizine, cycloSPORINE, cyclobenzaprine, dapsone, fluticasone, glucosamine-chondroitin, hydrocortisone, levothyroxine, melatonin, montelukast, ondansetron, ondansetron ODT, oxyCODONE-acetaminophen, polycarbophil, promethazine, sennosides-docusate, and temozolomide    Allergies:  Allergies   Allergen Reactions    Sulfa Antibiotics Itching and Rash    Hydrocodone-Acetaminophen Nausea And Vomiting       Objective    Objective     Vitals:   Temp:  [97.4 °F (36.3 °C)-98 °F (36.7 °C)] 98 °F (36.7 °C)  Heart Rate:  [] 97  Resp:  [16-18] 18  BP: (109-124)/(68) 124/68    Physical Exam  Physical Exam  General: Appears in no acute distress, sitting upright in bed.  Skin: Pale with good turgor. A large bruise is present on the right lower extremity.  Cardiac: Heart has a regular rate and rhythm. No murmurs detected.  Respiratory: Respirations are even and unlabored.  Abdominal: Abdomen is soft, nontender, nondistended with positive bowel sounds.  Extremities: Extremities are without edema.  Neurologic: Alert and oriented x4. Moves all extremities. Speech is clear.    Result Review    Results      Result Review:  I have personally reviewed the results from the time of this admission to 5/19/2025 16:59 EDT and agree with these findings:  [x]  Laboratory list / accordion  []  Microbiology  []  Radiology  []  EKG/Telemetry   []  Cardiology/Vascular   []  Pathology  []  Old records  []   "Other:  Most notable findings include:       Assessment & Plan   Assessment / Plan     Brief Patient Summary:  Arely Mesa is a 69 y.o. female with past medical history of asthma, chronic low back pain/lumbar stenosis, hypothyroidism, hyperlipidemia, primary pancreatic neuroendocrine cancer, stage IV followed by Dr. Negrete who just completed second cycle of Temodar and Xeloda presenting with dizziness, palpitations, nausea and vomiting:    Active Hospital Problems:  Active Hospital Problems    Diagnosis     **Tachycardia     Nausea and vomiting     Chronic low back pain     Asthma     Primary pancreatic neuroendocrine tumor     Hypothyroidism (acquired)        Plan:   Assessment & Plan  Code Status: Full code    1. Dizziness      Tachycardia  - Reports experiencing dizziness when standing up, which is relieved by lying down. This symptom began after an epidural injection on Wednesday. Her heart rate has been elevated, reaching 130 bpm with minimal exertion. Blood pressure has remained stable, but she has experienced significant lightheadedness and a \"swimmy\" head sensation.  - An EKG will be performed this evening to evaluate her cardiac function.  - Echo completed 2/19/2025, with with normal structure and normal EF  -Cardiology will be consulted due to the potential side effect of coronary vasospasm from her chemotherapy treatment.  - Additional fluids will be administered this evening to address potential dehydration. Medications for nausea and vomiting, including Phenergan, Zofran, and dexamethasone as needed will be ordered.    2. Nausea and vomiting  - She experienced severe nausea and vomiting after an epidural injection on Wednesday, which persisted into Thursday.   - Currently improved after IV fluids, Zofran and dexamethasone given at oncology office today  -Will continue maintenance IV fluids overnight and PRNs for nausea    3. Stage IV pancreatic neuroendocrine tumor  - She recently completed a 14-day " cycle of Xeloda and Temodar for her stage IV pancreatic neuroendocrine tumor. She experienced significant nausea during the first cycle but reports that it is now controllable after cycle 2.  She will continue her chemotherapy regimen as prescribed.  -Consult Dr. Negrete to follow along    4.  Mild hyponatremia  -Not clinically significant, likely secondary to dehydration  -Continue normal saline maintenance IV fluids and monitor    5.  Mildly elevated LFTs  -Does have hyperlipidemia, not on statin currently.  Likely due to Xeloda  -Currently without abdominal pain total bilirubin within normal limits    6.  Chronic low back pain/L4-5 stenosis  - post epidural injection 5/14/2025   VTE Prophylaxis:  Pharmacologic VTE prophylaxis orders are present.        CODE STATUS:    Code Status (Patient has no pulse and is not breathing): CPR (Attempt to Resuscitate)  Medical Interventions (Patient has pulse or is breathing): Full Support  Level Of Support Discussed With: Patient    Admission Status:  I believe this patient meets observation status  Disposition likely home, expected discharge date 5/20/25.    Patient or patient representative verbalized consent for the use of Ambient Listening during the visit for chart documentation.  FABIO Ramirez 05/19/25      Electronically signed by FABIO Ramirez, 05/19/25, 5:11 PM EDT.    Patient seen briefly, she is tachy, pale, new anemia    Symptomatic tachycardia  - Reports increased tachycardia with exertion over the last few days  - May be related to anemia, potentially caused by current regimen of Xeloda and Temodar, completed one day prior  - Reports feeling lightheaded when standing up, with significant pulse increase with exertion  - Gently hydrate  - Check hemoglobin, peripheral smear, D-dimer, and reticulocyte count  - Consider transfusion if needed  - Discussed with Dr. Negrete regarding potential toxic side effects from chemotherapy    Anemia  - Reports  feeling pale and fatigued  - Labs show hemoglobin level of 10  - Anemia may be drug-induced from chemotherapy regimen  - Gently hydrate  - Check hemoglobin, peripheral smear, D-dimer, and reticulocyte count  - Consider transfusion if needed     Elina May MD 05/19/25 23:58 EDT

## 2025-05-19 NOTE — TELEPHONE ENCOUNTER
Called patient to discuss. She states that she has been feeling light headed since Friday afternoon, last episode of emesis was Thursday evening but she has been feeling very nauseated with decreased appetite. She states that she is drinking well and urinating but not eating much. She reports that her BP is running between 120/70 and 90/60 and HR is jumping up to 115 when up moving around. Discussed with Dr. Negrete and he would like patient to come in for fluids/labs/antiemetics today. Patient agreeable and notified that her appt will be at 1 PM today.

## 2025-05-20 ENCOUNTER — APPOINTMENT (OUTPATIENT)
Dept: CARDIOLOGY | Facility: HOSPITAL | Age: 70
End: 2025-05-20
Payer: MEDICARE

## 2025-05-20 LAB
ANION GAP SERPL CALCULATED.3IONS-SCNC: 9 MMOL/L (ref 5–15)
BASOPHILS # BLD AUTO: 0.01 10*3/MM3 (ref 0–0.2)
BASOPHILS NFR BLD AUTO: 0.3 % (ref 0–1.5)
BUN SERPL-MCNC: 12 MG/DL (ref 8–23)
BUN/CREAT SERPL: 16 (ref 7–25)
CALCIUM SPEC-SCNC: 7.9 MG/DL (ref 8.6–10.5)
CHLORIDE SERPL-SCNC: 99 MMOL/L (ref 98–107)
CO2 SERPL-SCNC: 21 MMOL/L (ref 22–29)
CREAT SERPL-MCNC: 0.75 MG/DL (ref 0.57–1)
D DIMER PPP FEU-MCNC: 1.52 MCGFEU/ML (ref 0–0.69)
DEPRECATED RDW RBC AUTO: 47.8 FL (ref 37–54)
EGFRCR SERPLBLD CKD-EPI 2021: 86.3 ML/MIN/1.73
EOSINOPHIL # BLD AUTO: 0.11 10*3/MM3 (ref 0–0.4)
EOSINOPHIL NFR BLD AUTO: 3.7 % (ref 0.3–6.2)
ERYTHROCYTE [DISTWIDTH] IN BLOOD BY AUTOMATED COUNT: 15.1 % (ref 12.3–15.4)
GLUCOSE SERPL-MCNC: 127 MG/DL (ref 65–99)
HCT VFR BLD AUTO: 25.8 % (ref 34–46.6)
HGB BLD-MCNC: 8.2 G/DL (ref 12–15.9)
IMM GRANULOCYTES # BLD AUTO: 0.01 10*3/MM3 (ref 0–0.05)
IMM GRANULOCYTES NFR BLD AUTO: 0.3 % (ref 0–0.5)
LYMPHOCYTES # BLD AUTO: 0.8 10*3/MM3 (ref 0.7–3.1)
LYMPHOCYTES NFR BLD AUTO: 26.8 % (ref 19.6–45.3)
MCH RBC QN AUTO: 30.1 PG (ref 26.6–33)
MCHC RBC AUTO-ENTMCNC: 31.8 G/DL (ref 31.5–35.7)
MCV RBC AUTO: 94.9 FL (ref 79–97)
MONOCYTES # BLD AUTO: 0.29 10*3/MM3 (ref 0.1–0.9)
MONOCYTES NFR BLD AUTO: 9.7 % (ref 5–12)
NEUTROPHILS NFR BLD AUTO: 1.77 10*3/MM3 (ref 1.7–7)
NEUTROPHILS NFR BLD AUTO: 59.2 % (ref 42.7–76)
NRBC BLD AUTO-RTO: 0 /100 WBC (ref 0–0.2)
PLATELET # BLD AUTO: 278 10*3/MM3 (ref 140–450)
PMV BLD AUTO: 8.8 FL (ref 6–12)
POTASSIUM SERPL-SCNC: 3.8 MMOL/L (ref 3.5–5.2)
QT INTERVAL: 372 MS
QTC INTERVAL: 455 MS
RBC # BLD AUTO: 2.72 10*6/MM3 (ref 3.77–5.28)
RETICS # AUTO: 0.04 10*6/MM3 (ref 0.02–0.13)
RETICS/RBC NFR AUTO: 1.39 % (ref 0.7–1.9)
SODIUM SERPL-SCNC: 129 MMOL/L (ref 136–145)
TSH SERPL DL<=0.05 MIU/L-ACNC: 1.45 UIU/ML (ref 0.27–4.2)
WBC NRBC COR # BLD AUTO: 2.99 10*3/MM3 (ref 3.4–10.8)

## 2025-05-20 PROCEDURE — 25010000002 ENOXAPARIN PER 10 MG: Performed by: NURSE PRACTITIONER

## 2025-05-20 PROCEDURE — 93356 MYOCRD STRAIN IMG SPCKL TRCK: CPT

## 2025-05-20 PROCEDURE — 85060 BLOOD SMEAR INTERPRETATION: CPT | Performed by: INTERNAL MEDICINE

## 2025-05-20 PROCEDURE — 85045 AUTOMATED RETICULOCYTE COUNT: CPT | Performed by: INTERNAL MEDICINE

## 2025-05-20 PROCEDURE — 25810000003 SODIUM CHLORIDE 0.9 % SOLUTION: Performed by: NURSE PRACTITIONER

## 2025-05-20 PROCEDURE — 99232 SBSQ HOSP IP/OBS MODERATE 35: CPT | Performed by: INTERNAL MEDICINE

## 2025-05-20 PROCEDURE — 97165 OT EVAL LOW COMPLEX 30 MIN: CPT

## 2025-05-20 PROCEDURE — 80048 BASIC METABOLIC PNL TOTAL CA: CPT | Performed by: NURSE PRACTITIONER

## 2025-05-20 PROCEDURE — 84443 ASSAY THYROID STIM HORMONE: CPT | Performed by: NURSE PRACTITIONER

## 2025-05-20 PROCEDURE — 93356 MYOCRD STRAIN IMG SPCKL TRCK: CPT | Performed by: INTERNAL MEDICINE

## 2025-05-20 PROCEDURE — 93308 TTE F-UP OR LMTD: CPT | Performed by: INTERNAL MEDICINE

## 2025-05-20 PROCEDURE — 93308 TTE F-UP OR LMTD: CPT

## 2025-05-20 PROCEDURE — 93325 DOPPLER ECHO COLOR FLOW MAPG: CPT

## 2025-05-20 PROCEDURE — 85025 COMPLETE CBC W/AUTO DIFF WBC: CPT | Performed by: NURSE PRACTITIONER

## 2025-05-20 PROCEDURE — 99222 1ST HOSP IP/OBS MODERATE 55: CPT | Performed by: INTERNAL MEDICINE

## 2025-05-20 PROCEDURE — 93321 DOPPLER ECHO F-UP/LMTD STD: CPT | Performed by: INTERNAL MEDICINE

## 2025-05-20 PROCEDURE — 93325 DOPPLER ECHO COLOR FLOW MAPG: CPT | Performed by: INTERNAL MEDICINE

## 2025-05-20 PROCEDURE — 97162 PT EVAL MOD COMPLEX 30 MIN: CPT

## 2025-05-20 PROCEDURE — 25810000003 SODIUM CHLORIDE 0.9 % SOLUTION: Performed by: INTERNAL MEDICINE

## 2025-05-20 PROCEDURE — 99221 1ST HOSP IP/OBS SF/LOW 40: CPT | Performed by: NURSE PRACTITIONER

## 2025-05-20 PROCEDURE — 85379 FIBRIN DEGRADATION QUANT: CPT | Performed by: INTERNAL MEDICINE

## 2025-05-20 PROCEDURE — 93321 DOPPLER ECHO F-UP/LMTD STD: CPT

## 2025-05-20 RX ORDER — SODIUM CHLORIDE 9 MG/ML
100 INJECTION, SOLUTION INTRAVENOUS CONTINUOUS
Status: ACTIVE | OUTPATIENT
Start: 2025-05-20 | End: 2025-05-21

## 2025-05-20 RX ADMIN — POLYETHYLENE GLYCOL 3350 17 G: 17 POWDER, FOR SOLUTION ORAL at 12:32

## 2025-05-20 RX ADMIN — MONTELUKAST 10 MG: 10 TABLET, FILM COATED ORAL at 20:28

## 2025-05-20 RX ADMIN — SENNOSIDES, DOCUSATE SODIUM 2 TABLET: 50; 8.6 TABLET, FILM COATED ORAL at 20:28

## 2025-05-20 RX ADMIN — LEVOTHYROXINE SODIUM 75 MCG: 0.07 TABLET ORAL at 06:09

## 2025-05-20 RX ADMIN — OXYBUTYNIN CHLORIDE 5 MG: 5 TABLET, EXTENDED RELEASE ORAL at 08:19

## 2025-05-20 RX ADMIN — ACETAMINOPHEN 650 MG: 325 TABLET ORAL at 16:21

## 2025-05-20 RX ADMIN — SODIUM CHLORIDE 100 ML/HR: 9 INJECTION, SOLUTION INTRAVENOUS at 16:33

## 2025-05-20 RX ADMIN — SENNOSIDES, DOCUSATE SODIUM 2 TABLET: 50; 8.6 TABLET, FILM COATED ORAL at 08:19

## 2025-05-20 RX ADMIN — SODIUM CHLORIDE 100 ML/HR: 9 INJECTION, SOLUTION INTRAVENOUS at 03:16

## 2025-05-20 RX ADMIN — ENOXAPARIN SODIUM 40 MG: 100 INJECTION SUBCUTANEOUS at 08:18

## 2025-05-20 RX ADMIN — FLUTICASONE PROPIONATE 2 SPRAY: 50 SPRAY, METERED NASAL at 08:19

## 2025-05-20 RX ADMIN — Medication 10 MG: at 20:28

## 2025-05-20 RX ADMIN — Medication 10 ML: at 08:18

## 2025-05-20 RX ADMIN — CETIRIZINE HYDROCHLORIDE 10 MG: 10 TABLET, FILM COATED ORAL at 08:19

## 2025-05-20 RX ADMIN — FAMOTIDINE 40 MG: 20 TABLET, FILM COATED ORAL at 08:19

## 2025-05-20 RX ADMIN — SODIUM CHLORIDE 100 ML/HR: 9 INJECTION, SOLUTION INTRAVENOUS at 09:36

## 2025-05-20 RX ADMIN — DAPSONE 100 MG: 100 TABLET ORAL at 20:28

## 2025-05-20 NOTE — PROGRESS NOTES
[facility] Medicine Services  PROGRESS NOTE    Patient Name: Arely Mesa  : 1955  MRN: [id number]    Date of Admission: 2025  Primary Care Physician: Mohini Baeza MD    Subjective   Subjective     CC:  dizziness    HPI:  Feeling better this am, not feeling as if her heart is racing as much as it has been.       Objective   Objective     Vital Signs:   Temp:  [97.4 °F (36.3 °C)-98.3 °F (36.8 °C)] 97.8 °F (36.6 °C)  Heart Rate:  [] 97  Resp:  [16-18] 18  BP: (109-126)/(62-76) 120/76     Physical Exam:  Constitutional: No acute distress, awake, alert  HENT: NCAT, mucous membranes moist  Respiratory: Clear to auscultation bilaterally, respiratory effort normal   Cardiovascular: RRR- tachycardic at times up to 105 but mainly staying around 90 appears NSR on tele, no murmurs, rubs, or gallops  Gastrointestinal: Positive bowel sounds, soft, nontender, nondistended  Musculoskeletal: No bilateral ankle edema  Psychiatric: Appropriate affect, cooperative  Neurologic: Oriented x 3, strength symmetric in all extremities, Cranial Nerves grossly intact to confrontation, speech clear  Skin: No rashes     Results Reviewed:  LAB RESULTS:      Lab 25  1300   WBC 3.00*   HEMOGLOBIN 10.3*   HEMATOCRIT 30.5*   PLATELETS 259   NEUTROS ABS 1.95   IMMATURE GRANS (ABS) 0.01   LYMPHS ABS 0.70   MONOS ABS 0.26   EOS ABS 0.07   MCV 91.9         Lab 25  1305   SODIUM 131*   POTASSIUM 4.3   CHLORIDE 95*   CO2 22.0   ANION GAP 14.0   BUN 14   CREATININE 0.81   EGFR 78.7   GLUCOSE 118*   CALCIUM 8.3*         Lab 25  1305   TOTAL PROTEIN 6.4   ALBUMIN 4.1   GLOBULIN 2.3   ALT (SGPT) 39*   AST (SGOT) 36*   BILIRUBIN 0.7   ALK PHOS 128*                     Brief Urine Lab Results  (Last result in the past 365 days)        Color   Clarity   Blood   Leuk Est   Nitrite   Protein   CREAT   Urine HCG        25 1444 Yellow   Clear   Negative   Trace   Negative   30 mg/dL (1+)                    Microbiology Results Abnormal       None            No radiology results from the last 24 hrs    Results for orders placed during the hospital encounter of 02/19/25    Adult Transthoracic Echo Complete W/ Cont if Necessary Per Protocol    Interpretation Summary    Left ventricular systolic function is normal. Calculated left ventricular EF = 63.7%    Normal left atrial size and volume noted.    Global longitudinal LV strain (GLS) = -17.0%.      Current medications:  Scheduled Meds:cetirizine, 10 mg, Oral, Daily  dapsone, 100 mg, Oral, Nightly  enoxaparin sodium, 40 mg, Subcutaneous, Daily  famotidine, 40 mg, Oral, Daily  fluticasone, 2 spray, Nasal, Daily  levothyroxine, 75 mcg, Oral, Daily  melatonin, 10 mg, Oral, Nightly  montelukast, 10 mg, Oral, Nightly  oxybutynin XL, 5 mg, Oral, Daily  sennosides-docusate, 2 tablet, Oral, BID  sodium chloride, 10 mL, Intravenous, Q12H      Continuous Infusions:sodium chloride, 100 mL/hr, Last Rate: 100 mL/hr (05/20/25 0316)      PRN Meds:.  acetaminophen    polyethylene glycol **AND** bisacodyl **AND** bisacodyl    Calcium Replacement - Follow Nurse / BPA Driven Protocol    cyclobenzaprine    dexAMETHasone    hydrocortisone    Magnesium Standard Dose Replacement - Follow Nurse / BPA Driven Protocol    oxyCODONE-acetaminophen    Phosphorus Replacement - Follow Nurse / BPA Driven Protocol    Potassium Replacement - Follow Nurse / BPA Driven Protocol    sodium chloride    sodium chloride    Assessment & Plan   Assessment & Plan     Active Hospital Problems    Diagnosis  POA    **Tachycardia [R00.0]  Unknown    Nausea and vomiting [R11.2]  Unknown    Chronic low back pain [M54.50, G89.29]  Unknown    Asthma [J45.909]  Unknown    Primary pancreatic neuroendocrine tumor [D3A.8]  Yes    Hypothyroidism (acquired) [E03.9]  Yes      Resolved Hospital Problems   No resolved problems to display.        Brief Hospital Course to date:  Arely Mesa is a 69 y.o. female with  "PMH of hypothyroidism, hypercholesterolemia, exercise-induced asthma and stage IV pancreatic neuroendocrine tumor on chemo recently completed a 14-day cycle of Xeloda and Temodar (cycle 2) who presented with dizziness and tachycardia.     Plan:    Dizziness  Tachycardia  - Reports experiencing dizziness when standing up, which is relieved by lying down. This symptom began after an epidural injection on 5/14. Her heart rate has been elevated, reaching 130 bpm with minimal exertion. Blood pressure has remained stable, but she has experienced significant lightheadedness and a \"swimmy\" head sensation.  -- suspect secondary to steroid injection vs chemo side effect.   - Echo completed 2/19/2025, with with normal structure and normal EF  -Cardiology consulted due to the potential side effect of coronary vasospasm from her chemotherapy treatment.  -- continue IVFs  -- Phenergan, Zofran, and dexamethasone PRN nausea     Nausea and vomiting  - Currently improved after IV fluids, Zofran and dexamethasone given at oncology office   -Will continue maintenance IV fluids and PRNs for nausea     Stage IV pancreatic neuroendocrine tumor  - She recently completed a 14-day cycle of Xeloda and Temodar for her stage IV pancreatic neuroendocrine tumor. She experienced significant nausea during the first cycle but reports that it is now controllable after cycle 2.  She will continue her chemotherapy regimen as prescribed.  -Consulted Dr. Negrete to follow along    Anemia  Leukopenia  -- likely due to chemo  -- HgB around 10, monitor and transfuse PRN HgB<7 and/or symptomatic anemia      Mild hyponatremia  -Not clinically significant, likely secondary to dehydration  -Continue normal saline maintenance IV fluids and monitor     Mildly elevated LFTs  -Does have hyperlipidemia, not on statin currently.  Likely due to Xeloda  -Currently without abdominal pain total bilirubin within normal limits     Chronic low back pain/L4-5 stenosis  - post " epidural steroid injection 5/14/2025    Total time spent: Time Spent: Time Spent: 35 minutes  Time spent includes time reviewing chart, face-to-face time, counseling patient/family/caregiver, ordering medications/tests/procedures, communicating with other health care professionals, documenting clinical information in the electronic health record, and coordination of care.      Expected Discharge Location and Transportation: home  Expected Discharge   Expected Discharge Date: 5/21/2025; Expected Discharge Time:      VTE Prophylaxis:  Pharmacologic VTE prophylaxis orders are present.         AM-PAC 6 Clicks Score (PT): 22 (05/19/25 5249)    CODE STATUS:   Code Status and Medical Interventions: CPR (Attempt to Resuscitate); Full Support   Ordered at: 05/19/25 2808     Code Status (Patient has no pulse and is not breathing):    CPR (Attempt to Resuscitate)     Medical Interventions (Patient has pulse or is breathing):    Full Support     Level Of Support Discussed With:    Patient       Shaneka Crystal MD  05/20/25

## 2025-05-20 NOTE — PLAN OF CARE
Goal Outcome Evaluation:  Up to BR w steady gait.   VSS on RA.   NSR on tele.   C/O constipation earlier, but had BM after lax given.    IVF cont.    Consult cardiology and Hem/Onc today.     Requesting to go home soon.

## 2025-05-20 NOTE — PROGRESS NOTES
Malnutrition Severity Assessment    Patient Name:  Arely Mesa  YOB: 1955  MRN: [id number]  Admit Date:  5/19/2025    Patient meets criteria for : Severe Malnutrition    Comments:  Pt meets criteria for severe, acute malnutrition with the indicators of moderate muscle wasting and moderate subcutaneous fat loss. Of note, recent initiation of oral chemotherapy with limiting PO tolerance on first round of treatment; noted to have muscle wasting.    Malnutrition Severity Assessment  Malnutrition Type: Acute Disease or Injury - Related Malnutrition  Malnutrition Type (Last 8 Hours)       Malnutrition Severity Assessment       Row Name 05/20/25 1440       Malnutrition Severity Assessment    Malnutrition Type Acute Disease or Injury - Related Malnutrition      Row Name 05/20/25 1440       Muscle Loss    Loss of Muscle Mass Findings Moderate    Basin Region Moderate - slight depression    Clavicle Bone Region Moderate - some protrusion in females, visible in males    Acromion Bone Region Moderate - acromion may slightly protrude    Scapular Bone Region Moderate - mild depression, bones may show slightly    Dorsal Hand Region Moderate - slight depression    Patellar Region Moderate - patella more prominent, less muscle definition around patella    Anterior Thigh Region Moderate - mild depression on inner thigh    Posterior Calf Region Moderate - some roundness, slight firmness      Row Name 05/20/25 1440       Fat Loss    Subcutaneous Fat Loss Findings Moderate    Orbital Region  Moderate -  somewhat hollowness, slightly dark circles    Upper Arm Region Moderate - some fat tissue, not ample      Row Name 05/20/25 1440       Criteria Met (Must meet criteria for severity in at least 2 of these categories: M Wasting, Fat Loss, Fluid, Secondary Signs, Wt. Status, Intake)    Patient meets criteria for  Severe Malnutrition                    Electronically signed by:  Irian Morales, MS,RD,LD  05/20/25 14:56  EDT

## 2025-05-20 NOTE — CONSULTS
HEMATOLOGY/ONCOLOGY INPATIENT CONSULT    REASON FOR CONSULT: Dizziness and elevated heart rate    Subjective   HISTORY OF PRESENT ILLNESS; I am asked to see this 69 y.o.  female currently on treatment for stage IV pancreatic neuroendocrine tumor status post 2 cycles of Xeloda and Temodar.  She had epidural steroid injection last week and had significant nausea and vomiting.  She was admitted for persistent dizziness and tachycardia.  Cardiology consulted due to the potential side effect of coronary vasospasm from her chemotherapy treatment.  She had a normal echocardiogram in 2025.  They ordered limited echocardiogram to rule out right heart involvement and pericardial effusion.  Patient states feeling better after IV fluids and increased oral intake.      Past Medical History:   Diagnosis Date    Asthma     Fibroids     Genital HSV     Hypercholesteremia     Off meds ~     Hypothyroid     Meniere disease, right along with tinnitus     Tubular adenoma of colon 2015    f/u scope  normal    Tubular adenoma of colon 2023     Past Surgical History:   Procedure Laterality Date     SECTION  1985    ORIF PATELLA FRACTURE Left 2018    RHINOPLASTY  1990    TONSILLECTOMY  1964    TRIGGER FINGER RELEASE Left 2013    index finger       No current facility-administered medications on file prior to encounter.     Current Outpatient Medications on File Prior to Encounter   Medication Sig Dispense Refill    Calcium Citrate-Vitamin D3 (CITRACAL) 315-6.25 MG-MCG tablet tablet Take 2 tablets by mouth Daily.      capecitabine (XELODA) 150 MG chemo tablet Take 2 tablets by mouth with 1 other capecitabine prescription for 1,300 mg total 2 (Two) Times a Day. On Days 1-14 then 14 days off of each 28-day cycle 56 tablet 5    capecitabine (XELODA) 500 MG chemo tablet Take 2 tablets by mouth with 1 other capecitabine prescription for 1,300 mg total 2 (Two) Times a Day. On  Days 1-14 then 14 days off of each 28-day cycle 56 tablet 5    cetirizine (zyrTEC) 10 MG tablet Take 1 tablet by mouth Daily.      cycloSPORINE (RESTASIS) 0.05 % ophthalmic emulsion Apply 2 drops to eye(s) as directed by provider Every 12 (Twelve) Hours.      dapsone 100 MG tablet Take 1 tablet by mouth Daily. 30 tablet 3    Duavee 0.45-20 MG tablet Take 1 each by mouth Daily. 90 tablet 4    fluticasone (FLONASE) 50 MCG/ACT nasal spray Administer 2 sprays into the nostril(s) as directed by provider Daily.      glucosamine-chondroitin 500-400 MG per tablet Take 1 tablet by mouth Daily.      hydrocortisone (ANUSOL-HC) 25 MG suppository Insert 1 suppository into the rectum 2 (Two) Times a Day As Needed for Hemorrhoids. 12 each 0    levothyroxine (SYNTHROID, LEVOTHROID) 75 MCG tablet Take 1 tablet by mouth Daily.      melatonin 5 MG tablet tablet Take 2 tablets by mouth Every Night. Takes 1-2 tablets      Mirabegron ER (Myrbetriq) 25 MG tablet sustained-release 24 hour 24 hr tablet Take 1 tablet by mouth Daily. 90 tablet 4    montelukast (SINGULAIR) 10 MG tablet Take 1 tablet by mouth Every Night.      ondansetron ODT (ZOFRAN-ODT) 8 MG disintegrating tablet Place 1 tablet on the tongue Every 8 (Eight) Hours As Needed for Nausea or Vomiting. 30 tablet 3    polycarbophil (calcium polycarbophil) 625 MG tablet tablet Take 1 tablet by mouth Daily. Takes 1-2 times daily      promethazine (PHENERGAN) 25 MG suppository Insert 1 suppository into the rectum Every 8 (Eight) Hours As Needed for Nausea or Vomiting. 12 suppository 1    promethazine (PHENERGAN) 25 MG tablet Take 1 tablet by mouth Every 8 (Eight) Hours As Needed for Nausea or Vomiting. 30 tablet 5    sennosides-docusate (PERICOLACE) 8.6-50 MG per tablet Take 2 tablets by mouth 2 (Two) Times a Day. 60 tablet 0    temozolomide (TEMODAR) 140 MG chemo capsule Take 1 capsule by mouth with 1 other temozolomide prescription for 320 mg total Daily. On Days 10-14 of a 28-day  cycle 5 capsule 5    temozolomide (TEMODAR) 180 MG chemo capsule Take 1 capsule by mouth with 1 other temozolomide prescription for 320 mg total Daily. On Days 10-14 of a 28-day cycle 5 capsule 5    TURMERIC PO Take 553 mg by mouth Daily.      cyclobenzaprine (FLEXERIL) 10 MG tablet Take 1 tablet by mouth 3 (Three) Times a Day As Needed for Muscle Spasms. 90 tablet 0    oxyCODONE-acetaminophen (Percocet) 5-325 MG per tablet Take 1 tablet by mouth Every 6 (Six) Hours As Needed for Moderate Pain. 12 tablet 0    [DISCONTINUED] ondansetron (ZOFRAN) 8 MG tablet Take 1 tablet PO 30 minutes prior to cancer treatment daily and every 8 hours as needed for breakthrough nausea 42 tablet 5     Allergies   Allergen Reactions    Sulfa Antibiotics Itching and Rash    Hydrocodone-Acetaminophen Nausea And Vomiting     Social History     Socioeconomic History    Marital status:     Number of children: 2   Tobacco Use    Smoking status: Never   Vaping Use    Vaping status: Never Used   Substance and Sexual Activity    Alcohol use: Yes     Alcohol/week: 2.0 standard drinks of alcohol     Types: 2 Glasses of wine per week    Drug use: No    Sexual activity: Not Currently     Family History   Problem Relation Age of Onset    Uterine cancer Mother     Pancreatic cancer Paternal Grandmother          REVIEW OF SYSTEMS:  A 14 point review of systems was performed and is negative except as noted above.    Objective   PHYSICAL EXAM:    BP 93/76 (Patient Position: Sitting)   Pulse 83   Temp 98.2 °F (36.8 °C) (Oral)   Resp 18   LMP 02/23/2014 (Approximate)   SpO2 99%             ECOG: (1) Restricted in Physically Strenuous Activity, Ambulatory & Able to Do Work of Light Nature  General: well appearing female in no acute distress  HEENT: sclerae anicteric, oropharynx clear  Cardiovascular: regular rate and rhythm, no murmurs  Lungs: clear to auscultation bilaterally. No respiratory distress  Abdomen: soft, nontender, nondistended.    Extremities: no lower extremity edema, cyanosis, or clubbing  Skin: no rashes, lesions, bruising, or petechiae  Neuro: Alert and oriented x3. Moves all extremities.    Results:    Results from last 7 days   Lab Units 05/19/25  1300   WBC 10*3/mm3 3.00*   HEMOGLOBIN g/dL 10.3*   PLATELETS 10*3/mm3 259     Results from last 7 days   Lab Units 05/19/25  1305   SODIUM mmol/L 131*   POTASSIUM mmol/L 4.3   CO2 mmol/L 22.0   BUN mg/dL 14   CREATININE mg/dL 0.81   GLUCOSE mg/dL 118*     Results from last 7 days   Lab Units 05/19/25  1305   AST (SGOT) U/L 36*   ALT (SGPT) U/L 39*   BILIRUBIN mg/dL 0.7   ALK PHOS U/L 128*         Mammo Screening Digital Tomosynthesis Bilateral With CAD  Result Date: 5/16/2025  No mammographic evidence of malignancy. BI-RADS CATEGORY: Overall: 1 - Negative RECOMMENDATION:       - Routine Screening Mammogram in 1 Year. Patient Lifetime Risk Score of Breast Malignancy: 5.8% This risk assessment is calculated using the [employer] Risk Assessment model which may underestimate the lifetime risk of breast malignancy. COMMUNICATION: Computer-aided detection (CAD) and tomosynthesis were utilized by the radiologist in the interpretation of this examination. The results and recommendations will be sent to the patient in a printed lay language version of the imaging report.        Assessment    ASSESSMENT & PLAN:  1.  Stage IV pancreatic neuroendocrine tumor currently on treatment with 14-day cycle Xeloda and Temodar status post 2 cycles.  She had lumbar epidural steroid injection last week with significant nausea and vomiting.  She was admitted for persistent dizziness and tachycardia.  Echocardiogram completed February 2025 with normal structure and normal EF.  Cardiology consulted who recommends repeat echocardiogram to rule out right heart involvement or pericardial effusion.  Patient reports feeling improved after IV fluids and increased oral hydration.  Labs reviewed from yesterday with mild leukopenia  and anemia which is expected with chemotherapy.  She is okay for discharge from oncology standpoint when cleared by cardiology and medically stable.  We will see her back as scheduled on 5/30/2025 prior to cycle 3.      Mandy Yin, APRN    5/20/2025

## 2025-05-20 NOTE — CASE MANAGEMENT/SOCIAL WORK
Discharge Planning Assessment  [facility]     Patient Name: Arely Mesa  MRN: [id number]  Today's Date: 5/20/2025    Admit Date: 5/19/2025    Plan: home   Discharge Needs Assessment       Row Name 05/20/25 0830       Living Environment    People in Home alone    Primary Care Provided by self       Transition Planning    Patient/Family Anticipates Transition to home       Discharge Needs Assessment    Readmission Within the Last 30 Days no previous admission in last 30 days    Equipment Currently Used at Home none    Concerns to be Addressed basic needs;discharge planning                   Discharge Plan       Row Name 05/20/25 0830       Plan    Plan home    Patient/Family in Agreement with Plan yes    Plan Comments I met with this patient bedside. She lives alone in [facility]. She is independent with activities of daily living and mobility. PT and OT have been consulted. She anticipates returning home after this hospitalization, and her sister or son can transport. Case management will follow.    Final Discharge Disposition Code 01 - home or self-care                  Selected Continued Care - Episodes Includes continued care and service providers with selected services from the active episodes listed below          Expected Discharge Date and Time       Expected Discharge Date Expected Discharge Time    May 22, 2025            Demographic Summary       Row Name 05/20/25 0829       General Information    General Information Comments I confirmed that Mohini Baeza is Ms Mesa's PCP and she has Medicare [employer] and [employer]                   Functional Status       Row Name 05/20/25 0830       Functional Status, IADL    Medications independent    Meal Preparation independent    Housekeeping independent    Laundry independent    Shopping independent                   Psychosocial    No documentation.                  Abuse/Neglect    No documentation.                  Legal    No documentation.                   Substance Abuse    No documentation.                  Patient Forms    No documentation.                     Maddy Smyth RN

## 2025-05-20 NOTE — THERAPY DISCHARGE NOTE
Patient Name: Arely Mesa  : 1955    MRN: [id number]                              Today's Date: 2025       Admit Date: 2025    Visit Dx: No diagnosis found.  Patient Active Problem List   Diagnosis    Annual GYN exam in     Hypothyroidism (acquired)    Osteopenia with FRAX n/a    FH: Alzheimer's disease    Hormone replacement therapy - started ~ 53 years old    Increased risk of breast cancer - elevated CASI score    Pancreatic mass with metastasis to liver, spleen, bone & associated lymphadenopathy    Overactive bladder    Primary pancreatic neuroendocrine tumor    Encounter for long-term (current) use of high-risk medication    Malignant poorly differentiated neuroendocrine carcinoma    Tachycardia    Nausea and vomiting    Chronic low back pain    Asthma     Past Medical History:   Diagnosis Date    Asthma     Fibroids     Genital HSV     Hypercholesteremia     Off meds ~     Hypothyroid 2003    Meniere disease, right along with tinnitus     Tubular adenoma of colon 2015    f/u scope 2018 normal    Tubular adenoma of colon 2023     Past Surgical History:   Procedure Laterality Date     SECTION  1985    ORIF PATELLA FRACTURE Left 2018    RHINOPLASTY  1990    TONSILLECTOMY  1964    TRIGGER FINGER RELEASE Left 2013    index finger      General Information       Row Name 25 0805          OT Time and Intention    Document Type discharge evaluation/summary  -     Mode of Treatment occupational therapy  -       Row Name 25 0805          General Information    Patient Profile Reviewed yes  -MC     Prior Level of Function independent:;bed mobility;transfer;all household mobility;community mobility;ADL's  Pt denies use of DME at baseline, one recent stumble in garage but pt denies any recent falls  -     Existing Precautions/Restrictions other (see comments)  monitor HR, elevated HR w/ minimal activity w/ associated dizziness   -     Barriers to Rehab medically complex  -       Row Name 05/20/25 0805          Living Environment    Current Living Arrangements home  -     People in Home alone  -       Row Name 05/20/25 0805          Home Main Entrance    Number of Stairs, Main Entrance six  -       Row Name 05/20/25 0805          Stairs Within Home, Primary    Stairs, Within Home, Primary 14 steps up to bedroom + 14 steps down to basement  -     Number of Stairs, Within Home, Primary other (see comments)  -       Row Name 05/20/25 0805          Cognition    Orientation Status (Cognition) oriented x 4  -       Row Name 05/20/25 0805          Safety Issues/Impairments Affecting Functional Mobility    Impairments Affecting Function (Mobility) pain  -               User Key  (r) = Recorded By, (t) = Taken By, (c) = Cosigned By      Initials Name Provider Type     Faby Garcia OT Occupational Therapist                     Mobility/ADL's       Row Name 05/20/25 0807          Bed Mobility    Bed Mobility supine-sit  -     Supine-Sit [place] (Bed Mobility) modified independence  -       Row Name 05/20/25 0807          Transfers    Transfers sit-stand transfer;stand-sit transfer;toilet transfer  -[facility] Name 05/20/25 0807          Sit-Stand Transfer    Sit-Stand [place] (Transfers) standby assist  -       Row Name 05/20/25 0807          Stand-Sit Transfer    Stand-Sit [place] (Transfers) standby assist  -[facility] Name 05/20/25 0807          Toilet Transfer    Type (Toilet Transfer) sit-stand;stand-sit  -     [place] Level (Toilet Transfer) standby assist  -       Row Name 05/20/25 0807          Functional Mobility    Functional Mobility- Ind. Level standby assist  -     Functional Mobility-Distance (Feet) 30  -       Row Name 05/20/25 0807          Activities of Daily Living    BADL Assessment/Intervention lower body dressing;toileting  -       Row Name 05/20/25 0807           Lower Body Dressing Assessment/Training    [place] Level (Lower Body Dressing) don;socks;independent  -     Position (Lower Body Dressing) long sitting  -       Row Name 05/20/25 0807          Toileting Assessment/Training    [place] Level (Toileting) adjust/manage clothing;perform perineal hygiene;independent  -     Assistive Devices (Toileting) commode  -     Position (Toileting) unsupported sitting;unsupported standing  -               User Key  (r) = Recorded By, (t) = Taken By, (c) = Cosigned By      Initials Name Provider Type     Faby Garcia OT Occupational Therapist                   Obj/Interventions       Row Name 05/20/25 0808          Sensory Assessment (Somatosensory)    Sensory Assessment (Somatosensory) UE sensation intact  -[facility] Name 05/20/25 0808          Vision Assessment/Intervention    Visual Impairment/Limitations WFL  -[facility] Name 05/20/25 0808          Range of Motion Comprehensive    General Range of Motion bilateral upper extremity ROM L  [facility] Name 05/20/25 0808          Balance    Balance Assessment sitting static balance;sitting dynamic balance;sit to stand dynamic balance;standing static balance;standing dynamic balance  -     Static Sitting Balance independent  -     Dynamic Sitting Balance independent  -     Position, Sitting Balance unsupported  -     Sit to Stand Dynamic Balance standby assist  -     Static Standing Balance standby assist  -     Dynamic Standing Balance standby assist  -     Position/Device Used, Standing Balance unsupported  -     Balance Interventions sitting;sit to stand;occupation based/functional task  -               User Key  (r) = Recorded By, (t) = Taken By, (c) = Cosigned By      Initials Name Provider Type     Faby Garcia OT Occupational Therapist                   Goals/Plan    No documentation.                  Clinical Impression       Row Name 05/20/25 0808          Pain  Assessment    Pain Location back  -     Pain Side/Orientation generalized  -     Pain Management Interventions exercise or physical activity utilized;positioning techniques utilized  -     Response to Pain Interventions activity participation with tolerable pain  -     Additional Documentation Pain Scale: FACES Pre/Post-Treatment (Group)  -[facility] Name 05/20/25 0808          Pain Scale: FACES Pre/Post-Treatment    Pain: FACES Scale, Pretreatment 4-->hurts little more  -     Posttreatment Pain Rating 4-->hurts little more  -MC       Row Name 05/20/25 0808          Plan of Care Review    Plan of Care Reviewed With patient  -     Outcome Evaluation Pt presents at baseline functional status w/ ADL independence. No further skilled IPOT services warranted at this time. Rec continued activity as tolerated w/ nursing staff, home w/ A at d/c once medically appropriate.  -[facility] 05/20/25 0808          Therapy Assessment/Plan (OT)    Criteria for Skilled Therapeutic Interventions Met (OT) no problems identified which require skilled intervention  -     Therapy Frequency (OT) evaluation only  -MC       Row Name 05/20/25 0808          Therapy Plan Review/Discharge Plan (OT)    Anticipated Discharge Disposition (OT) home with assist  -[facility] 05/20/25 0808          Vital Signs    Pre Systolic BP Rehab 126  -     Pre Treatment Diastolic BP 74  -     Intratreatment Heart Rate (beats/min) 136  -     Posttreatment Heart Rate (beats/min) 118  -     O2 Delivery Pre Treatment room air  -     O2 Delivery Intra Treatment room air  -     O2 Delivery Post Treatment room air  -     Pre Patient Position Supine  -     Intra Patient Position Standing  -     Post Patient Position Sitting  -[facility] 05/20/25 0808          Positioning and Restraints    Pre-Treatment Position in bed  -     Post Treatment Position bed  -     In Bed sitting EOB;with other staff  w/ CM  -                User Key  (r) = Recorded By, (t) = Taken By, (c) = Cosigned By      Initials Name Provider Type     Faby Garcia OT Occupational Therapist                   Outcome Measures       Row Name 05/20/25 0810          How much help from another is currently needed...    Putting on and taking off regular lower body clothing? 4  -MC     Bathing (including washing, rinsing, and drying) 4  -MC     Toileting (which includes using toilet bed pan or urinal) 4  -MC     Putting on and taking off regular upper body clothing 4  -MC     Taking care of personal grooming (such as brushing teeth) 4  -MC     Eating meals 4  -     AM-PAC 6 Clicks Score (OT) 24  -       Row Name 05/20/25 0810          Functional Assessment    Outcome Measure Options AM-PAC 6 Clicks Daily Activity (OT)  -               User Key  (r) = Recorded By, (t) = Taken By, (c) = Cosigned By      Initials Name Provider Type     Faby Garcia OT Occupational Therapist                    Occupational Therapy Education       Title: PT OT SLP Therapies (In Progress)       Topic: Occupational Therapy (In Progress)       Point: ADL training (Done)       Learning Progress Summary            Patient Acceptance, E, VU by  at 5/20/2025 0810                      Point: Precautions (Done)       Learning Progress Summary            Patient Acceptance, E, VU by  at 5/20/2025 0810                      Point: Body mechanics (Done)       Learning Progress Summary            Patient Acceptance, E, VU by  at 5/20/2025 0810                                      User Key       Initials Effective Dates Name Provider Type Discipline     10/14/22 -  Faby Garcia OT Occupational Therapist OT                  OT Recommendation and Plan  Therapy Frequency (OT): evaluation only  Plan of Care Review  Plan of Care Reviewed With: patient  Outcome Evaluation: Pt presents at baseline functional status w/ ADL independence. No further skilled IPOT services  warranted at this time. Rec continued activity as tolerated w/ nursing staff, home w/ A at d/c once medically appropriate.     Time Calculation:   Evaluation Complexity (OT)  Review Occupational Profile/Medical/Therapy History Complexity: brief/low complexity  Assessment, Occupational Performance/Identification of Deficit Complexity: 1-3 performance deficits  Clinical Decision Making Complexity (OT): problem focused assessment/low complexity  Overall Complexity of Evaluation (OT): low complexity     Time Calculation- OT       Row Name 05/20/25 0811             Time Calculation- OT    OT Start Time 0749  -      OT Received On 05/20/25  -MC         Untimed Charges    OT Eval/Re-eval Minutes 32  -MC         Total Minutes    Untimed Charges Total Minutes 32  -MC       Total Minutes 32  -MC                User Key  (r) = Recorded By, (t) = Taken By, (c) = Cosigned By      Initials Name Provider Type    Faby Hathaway OT Occupational Therapist                  Therapy Charges for Today       Code Description Service Date Service Provider Modifiers Qty    28704796181 HC OT EVAL LOW COMPLEXITY 3 5/20/2025 Faby Garcia OT GO 1                 Faby Garcia OT  5/20/2025

## 2025-05-20 NOTE — PLAN OF CARE
Goal Outcome Evaluation:  Plan of Care Reviewed With: patient           Outcome Evaluation: Pt presents at baseline functional status w/ ADL independence. No further skilled IPOT services warranted at this time. Rec continued activity as tolerated w/ nursing staff, home w/ A at d/c once medically appropriate.    Anticipated Discharge Disposition (OT): home with assist

## 2025-05-20 NOTE — THERAPY EVALUATION
Patient Name: Arely Mesa  : 1955    MRN: [id number]                              Today's Date: 2025       Admit Date: 2025    Visit Dx: No diagnosis found.  Patient Active Problem List   Diagnosis    Annual GYN exam in     Hypothyroidism (acquired)    Osteopenia with FRAX n/a    FH: Alzheimer's disease    Hormone replacement therapy - started ~ 53 years old    Increased risk of breast cancer - elevated CASI score    Pancreatic mass with metastasis to liver, spleen, bone & associated lymphadenopathy    Overactive bladder    Primary pancreatic neuroendocrine tumor    Encounter for long-term (current) use of high-risk medication    Malignant poorly differentiated neuroendocrine carcinoma    Tachycardia    Nausea and vomiting    Chronic low back pain    Asthma     Past Medical History:   Diagnosis Date    Asthma     Fibroids     Genital HSV     Hypercholesteremia     Off meds ~     Hypothyroid 2003    Meniere disease, right along with tinnitus     Tubular adenoma of colon 2015    f/u scope 2018 normal    Tubular adenoma of colon 2023     Past Surgical History:   Procedure Laterality Date     SECTION  1985    ORIF PATELLA FRACTURE Left 2018    RHINOPLASTY  1990    TONSILLECTOMY  1964    TRIGGER FINGER RELEASE Left 2013    index finger      General Information       Row Name 25 1105          Physical Therapy Time and Intention    Document Type evaluation  -AB     Mode of Treatment physical therapy  -AB       Row Name 25 1105          General Information    Patient Profile Reviewed yes  -AB     Prior Level of Function independent:;all household mobility;community mobility;gait;transfer;bed mobility;ADL's  Pt denies use of DME at baseline. 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  -AB     Existing Precautions/Restrictions other (see comments)  monitor HR, elevated HR w/ minimal activity w/ associated dizziness  -AB     Barriers to Rehab medically complex   -AB       Row Name 05/20/25 1105          Living Environment    Current Living Arrangements home  -AB     People in Home alone  -AB       Row Name 05/20/25 1105          Home Main Entrance    Number of Stairs, Main Entrance six  -AB       Row Name 05/20/25 1105          Stairs Within Home, Primary    Stairs, Within Home, Primary 14 steps up to bedroom + 14 steps down to basement  -AB     Number of Stairs, Within Home, Primary other (see comments)  14  -AB     Stair Railings, Within Home, Primary railings safe and in good condition  -AB       Row Name 05/20/25 1105          Cognition    Orientation Status (Cognition) oriented x 4  -AB       Row Name 05/20/25 1105          Safety Issues/Impairments Affecting Functional Mobility    Impairments Affecting Function (Mobility) endurance/activity tolerance  -AB               User Key  (r) = Recorded By, (t) = Taken By, (c) = Cosigned By      Initials Name Provider Type    AB Yesi Ricks PT Physical Therapist                   Mobility       Row Name 05/20/25 1107          Bed Mobility    Bed Mobility supine-sit;scooting/bridging;sit-supine  -AB     Scooting/Bridging [place] (Bed Mobility) standby assist  -AB     Supine-Sit [place] (Bed Mobility) standby assist  -AB     Sit-Supine [place] (Bed Mobility) standby assist  -AB     Assistive Device (Bed Mobility) head of bed elevated  -AB     Comment, (Bed Mobility) no issues noted  -AB       Row Name 05/20/25 1107          Transfers    Comment, (Transfers) Cues for sequencing. Denied dizziness throughout.  -AB       Row Name 05/20/25 1107          Sit-Stand Transfer    Sit-Stand [place] (Transfers) standby assist  -AB       Row Name 05/20/25 1107          Gait/Stairs (Locomotion)    [place] Level (Gait) contact guard;1 person assist  -AB     Patient was able to Ambulate yes  -AB     Distance in Feet (Gait) 400  -AB     Deviations/Abnormal Patterns (Gait) bilateral deviations;gait speed decreased   -AB     [place] Level (Stairs) unable to assess  -AB     Comment, (Gait/Stairs) Pt ambulated with step through gait pattern at slightly slowed pace.  bpm w/ no c/o dizziness. Unable to trial steps secondary to IV pole. Will attempt next session.  -AB               User Key  (r) = Recorded By, (t) = Taken By, (c) = Cosigned By      Initials Name Provider Type    AB Yesi Ricks, PT Physical Therapist                   Obj/Interventions       Row Name 05/20/25 1109          Range of Motion Comprehensive    General Range of Motion bilateral lower extremity ROM WNL  -AB       Row Name 05/20/25 1109          Strength Comprehensive (MMT)    General Manual Muscle Testing (MMT) Assessment no strength deficits identified  -AB     Comment, General Manual Muscle Testing (MMT) Assessment BLE grossly 5/5  -AB       Row Name 05/20/25 1109          Balance    Balance Assessment sitting static balance;sitting dynamic balance;standing static balance;standing dynamic balance  -AB     Static Sitting Balance independent  -AB     Dynamic Sitting Balance independent  -AB     Position, Sitting Balance unsupported;sitting edge of bed  -AB     Static Standing Balance standby assist  -AB     Dynamic Standing Balance contact guard  -AB     Position/Device Used, Standing Balance unsupported  -AB     Balance Interventions sitting;standing;sit to stand;static;dynamic;occupation based/functional task  -AB     Comment, Balance No overt LOB or knee buckling.  -AB       Row Name 05/20/25 1109          Sensory Assessment (Somatosensory)    Sensory Assessment (Somatosensory) LE sensation intact  -AB               User Key  (r) = Recorded By, (t) = Taken By, (c) = Cosigned By      Initials Name Provider Type    AB Yesi Ricks, PT Physical Therapist                   Goals/Plan       Row Name 05/20/25 1112          Bed Mobility Goal 1 (PT)    Activity/Assistive Device (Bed Mobility Goal 1, PT) sit to supine;supine to sit  -AB      [place] Level/Cues Needed (Bed Mobility Goal 1, PT) independent  -AB     Time Frame (Bed Mobility Goal 1, PT) short term goal (STG);5 days  -AB       Row Name 05/20/25 1112          Transfer Goal 1 (PT)    Activity/Assistive Device (Transfer Goal 1, PT) sit-to-stand/stand-to-sit;bed-to-chair/chair-to-bed  -AB     [place] Level/Cues Needed (Transfer Goal 1, PT) independent  -AB     Time Frame (Transfer Goal 1, PT) long term goal (LTG);10 days  -AB       Row Name 05/20/25 1112          Gait Training Goal 1 (PT)    Activity/Assistive Device (Gait Training Goal 1, PT) gait (walking locomotion)  -AB     [place] Level (Gait Training Goal 1, PT) standby assist  -AB     Distance (Gait Training Goal 1, PT) 600  -AB     Time Frame (Gait Training Goal 1, PT) long term goal (LTG);10 days  -AB       Row Name 05/20/25 1112          Stairs Goal 1 (PT)    Activity/Assistive Device (Stairs Goal 1, PT) ascending stairs;descending stairs  -AB     [place] Level/Cues Needed (Stairs Goal 1, PT) contact guard required  -AB     Number of Stairs (Stairs Goal 1, PT) 14  -AB     Time Frame (Stairs Goal 1, PT) long term goal (LTG);10 days  -AB       Row Name 05/20/25 1112          Therapy Assessment/Plan (PT)    Planned Therapy Interventions (PT) balance training;bed mobility training;gait training;home exercise program;patient/family education;postural re-education;transfer training;stretching;strengthening;stair training;ROM (range of motion)  -AB               User Key  (r) = Recorded By, (t) = Taken By, (c) = Cosigned By      Initials Name Provider Type    AB Yesi Ricks, PT Physical Therapist                   Clinical Impression       Row Name 05/20/25 1110          Pain    Pretreatment Pain Rating 0/10 - no pain  -AB     Posttreatment Pain Rating 0/10 - no pain  -AB       Row Name 05/20/25 1110          Plan of Care Review    Plan of Care Reviewed With patient  -AB     Progress no change  -AB     Outcome  Evaluation PT initial eval completed. Pt presents below baseline with decreased endurance and elevated HR. Ambulation of 400' with CGA and no AD was well tolerated. Further IPPT is warrented. PT rec d/c home w/ assist as needed when medically appropriate.  -AB       Row Name 05/20/25 1110          Therapy Assessment/Plan (PT)    Rehab Potential (PT) good  -AB     Criteria for Skilled Interventions Met (PT) yes;skilled treatment is necessary;meets criteria  -AB     Therapy Frequency (PT) daily  -AB     Predicted Duration of Therapy Intervention (PT) 10 days  -AB       Row Name 05/20/25 1110          Vital Signs    Pre Systolic BP Rehab 126  -AB     Pre Treatment Diastolic BP 74  -AB     Post Systolic BP Rehab 104  -AB     Post Treatment Diastolic BP 83  -AB     Pretreatment Heart Rate (beats/min) 92  -AB     Intratreatment Heart Rate (beats/min) 104  -AB     Posttreatment Heart Rate (beats/min) 83  -AB     Pre SpO2 (%) 98  -AB     O2 Delivery Pre Treatment room air  -AB     O2 Delivery Intra Treatment room air  -AB     Post SpO2 (%) 98  -AB     O2 Delivery Post Treatment room air  -AB     Pre Patient Position Supine  -AB     Intra Patient Position Standing  -AB     Post Patient Position Supine  -AB       Row Name 05/20/25 1110          Positioning and Restraints    Pre-Treatment Position in bed  -AB     Post Treatment Position bed  -AB     In Bed notified nsg;call light within reach;supine  Pt up ad amanda in room  -AB               User Key  (r) = Recorded By, (t) = Taken By, (c) = Cosigned By      Initials Name Provider Type    AB Yesi Ricks, PT Physical Therapist                   Outcome Measures       Row Name 05/20/25 1113          How much help from another person do you currently need...    Turning from your back to your side while in flat bed without using bedrails? 4  -AB     Moving from lying on back to sitting on the side of a flat bed without bedrails? 4  -AB     Moving to and from a bed to a chair  (including a wheelchair)? 3  -AB     Standing up from a chair using your arms (e.g., wheelchair, bedside chair)? 3  -AB     Climbing 3-5 steps with a railing? 3  -AB     To walk in hospital room? 3  -AB     AM-PAC 6 Clicks Score (PT) 20  -AB     Highest Level of Mobility Goal Walk 10 Steps or More-6  -AB       Row Name 05/20/25 1113 05/20/25 0810       Functional Assessment    Outcome Measure Options AM-PAC 6 Clicks Basic Mobility (PT)  -AB AM-PAC 6 Clicks Daily Activity (OT)  -              User Key  (r) = Recorded By, (t) = Taken By, (c) = Cosigned By      Initials Name Provider Type     Faby Garcia OT Occupational Therapist    AB Yesi Ricks, PT Physical Therapist                                 Physical Therapy Education       Title: PT OT SLP Therapies (In Progress)       Topic: Physical Therapy (In Progress)       Point: Mobility training (Done)       Learning Progress Summary            Patient Acceptance, E,D, VU,DU by AB at 5/20/2025 1113                      Point: Home exercise program (Not Started)       Learner Progress:  Not documented in this visit.              Point: Body mechanics (Done)       Learning Progress Summary            Patient Acceptance, E,D, VU,DU by AB at 5/20/2025 1113                      Point: Precautions (Done)       Learning Progress Summary            Patient Acceptance, E,D, VU,DU by AB at 5/20/2025 1113                                      User Key       Initials Effective Dates Name Provider Type Discipline    AB 09/22/22 -  Yesi Ricks, PT Physical Therapist PT                  PT Recommendation and Plan  Planned Therapy Interventions (PT): balance training, bed mobility training, gait training, home exercise program, patient/family education, postural re-education, transfer training, stretching, strengthening, stair training, ROM (range of motion)  Progress: no change  Outcome Evaluation: PT initial eval completed. Pt presents below baseline with  decreased endurance and elevated HR. Ambulation of 400' with CGA and no AD was well tolerated. Further IPPT is warrented. PT rec d/c home w/ assist as needed when medically appropriate.     Time Calculation:   PT Evaluation Complexity  History, PT Evaluation Complexity: 1-2 personal factors and/or comorbidities  Examination of Body Systems (PT Eval Complexity): total of 3 or more elements  Clinical Presentation (PT Evaluation Complexity): evolving  Clinical Decision Making (PT Evaluation Complexity): moderate complexity  Overall Complexity (PT Evaluation Complexity): moderate complexity     PT Charges       Row Name 05/20/25 1113             Time Calculation    Start Time 1040  -AB      PT Received On 05/20/25  -AB      PT Goal Re-Cert Due Date 05/30/25  -AB         Untimed Charges    PT Eval/Re-eval Minutes 46  -AB         Total Minutes    Untimed Charges Total Minutes 46  -AB       Total Minutes 46  -AB                User Key  (r) = Recorded By, (t) = Taken By, (c) = Cosigned By      Initials Name Provider Type    AB Yesi Ricks, PT Physical Therapist                  Therapy Charges for Today       Code Description Service Date Service Provider Modifiers Qty    [id number]  PT EVAL MOD COMPLEXITY 4 5/20/2025 Yesi Ricks, PT GP 1            PT G-Codes  Outcome Measure Options: AM-PAC 6 Clicks Basic Mobility (PT)  AM-PAC 6 Clicks Score (PT): 20  AM-PAC 6 Clicks Score (OT): 24  PT Discharge Summary  Anticipated Discharge Disposition (PT): home with assist    Yesi Ricks PT  5/20/2025

## 2025-05-20 NOTE — PLAN OF CARE
Goal Outcome Evaluation:  Plan of Care Reviewed With: patient        Progress: improving  Outcome Evaluation: VSS. A&Ox4. RA. Fall precautions in place. Up with standby. IVF's infusing. Pt is resting comfortably at this time. No complaints of pain. Will continue plan of care.

## 2025-05-20 NOTE — CONSULTS
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 Negrete/Dr. Floyd ([place])  Metastatic lesions in liver/spleen/spine  Chemotherapy, Xeloda/Temodar x 2 cycles (14-day)  Asthma  HLD  Hypothyroid  Spinal stenosis  S/p steroid injection 25  Chronic hyponatremia  Surgical history:    section  ORIF patella fracture, left  Rhinoplasty  Tonsillectomy  Left trigger finger release      Allergies  Allergies   Allergen Reactions    Sulfa Antibiotics Itching and Rash    Hydrocodone-Acetaminophen Nausea And Vomiting       Home Medications:  Current Outpatient Medications   Medication Instructions    Calcium Citrate-Vitamin D3 (CITRACAL) 315-6.25 MG-MCG tablet tablet 2 tablets, Daily    capecitabine (XELODA) 1,300 mg, Oral, 2 Times Daily, On Days 1-14 then 14 days off of each 28-day cycle    cetirizine (ZYRTEC) 10 mg, Daily    cyclobenzaprine (FLEXERIL) 10 mg, Oral, 3 Times Daily PRN    cycloSPORINE (RESTASIS) 0.05 % ophthalmic emulsion 2 drops, Every 12 Hours    dapsone 100 mg, Oral, Daily    Duavee 0.45-20 MG tablet 1 each, Oral, Daily    fluticasone (FLONASE) 50 MCG/ACT nasal spray 2 sprays, Daily    glucosamine-chondroitin 500-400 MG per  tablet 1 tablet, Daily    hydrocortisone (ANUSOL-HC) 25 mg, Rectal, 2 Times Daily PRN    levothyroxine (SYNTHROID, LEVOTHROID) 75 mcg, Daily    melatonin 10 mg, Nightly    Mirabegron ER (MYRBETRIQ) 25 mg, Oral, Daily    montelukast (SINGULAIR) 10 mg, Nightly    ondansetron (ZOFRAN) 8 MG tablet Take 1 tablet PO 30 minutes prior to cancer treatment daily and every 8 hours as needed for breakthrough nausea    ondansetron ODT (ZOFRAN-ODT) 8 mg, Translingual, Every 8 Hours PRN    oxyCODONE-acetaminophen (Percocet) 5-325 MG per tablet 1 tablet, Oral, Every 6 Hours PRN    polycarbophil 625 mg, Daily    promethazine (PHENERGAN) 25 mg, Oral, Every 8 Hours PRN    promethazine (PHENERGAN) 25 mg, Rectal, Every 8 Hours PRN    sennosides-docusate (PERICOLACE) 8.6-50 MG per tablet 2 tablets, Oral, 2 Times Daily    temozolomide (TEMODAR) 320 mg, Oral, Daily, On Days 10-14 of a 28-day cycle    TURMERIC  mg, Daily        Current Medications  Scheduled Meds:  cetirizine, 10 mg, Oral, Daily  dapsone, 100 mg, Oral, Nightly  enoxaparin sodium, 40 mg, Subcutaneous, Daily  famotidine, 40 mg, Oral, Daily  fluticasone, 2 spray, Nasal, Daily  levothyroxine, 75 mcg, Oral, Daily  melatonin, 10 mg, Oral, Nightly  montelukast, 10 mg, Oral, Nightly  oxybutynin XL, 5 mg, Oral, Daily  sennosides-docusate, 2 tablet, Oral, BID  sodium chloride, 10 mL, Intravenous, Q12H      Continuous Infusions:  sodium chloride, 100 mL/hr, Last Rate: 100 mL/hr (05/20/25 0316)  sodium chloride, 100 mL/hr, Last Rate: 100 mL/hr (05/20/25 0936)      PRN Meds:    acetaminophen    polyethylene glycol **AND** bisacodyl **AND** bisacodyl    Calcium Replacement - Follow Nurse / BPA Driven Protocol    cyclobenzaprine    dexAMETHasone    hydrocortisone    Magnesium Standard Dose Replacement - Follow Nurse / BPA Driven Protocol    oxyCODONE-acetaminophen    Phosphorus Replacement - Follow Nurse / BPA Driven Protocol    Potassium Replacement - Follow Nurse / BPA Driven  Protocol    sodium chloride    sodium chloride    History of Present Illness   Arely Mesa is a 69 y.o. year old female with a past medical history significant for stage IV pancreatic cancer, asthma, hypothyroidism, hyperlipidemia, and spinal stenosis admitted 5/19/2025 for orthostatic lightheadedness, tachycardia. She had a epidural steroid injection last Wednesday and experienced nausea and vomiting Thursday and Friday which triggered tachycardia (noted on Apple watch). Symptoms resolve while resting flat. No significant improvement after fluids in oncology office prior to admission.  She has completed 2 cycles of chemotherapy and reports significant nausea and vomiting with first cycle.  Second cycle nausea/vomiting controlled with preemptive Phenergan and Zofran. Last episode of vomiting on the weekend. Denies syncope, chest pain, shortness of breath, palpitations, and orthopnea. Decreased appetite but feels adequately hydrated.     ROS  Review of Systems   Musculoskeletal:  Positive for back pain.   Gastrointestinal:  Positive for nausea and vomiting.   Neurological:  Positive for light-headedness.   All other systems reviewed and are negative.      SOCIAL HX  Social History     Socioeconomic History    Marital status:     Number of children: 2   Tobacco Use    Smoking status: Never   Vaping Use    Vaping status: Never Used   Substance and Sexual Activity    Alcohol use: Yes     Alcohol/week: 2.0 standard drinks of alcohol     Types: 2 Glasses of wine per week    Drug use: No    Sexual activity: Not Currently       FAMILY HX  Family History   Problem Relation Age of Onset    Uterine cancer Mother     Pancreatic cancer Paternal Grandmother        OBJECTIVE:  Vitals:    05/20/25 0824 05/20/25 0828 05/20/25 0900 05/20/25 0957   BP:       BP Location:       Patient Position:       Pulse: 104 97 91 90   Resp:       Temp:       TempSrc:       SpO2: 97% 95% 100% 99%     No intake/output data recorded.  No  intake/output data recorded.  Intake & Output (last 3 days)       None             PHYSICAL EXAMINATION:  Vitals reviewed.   Constitutional:       Appearance: Healthy appearance.   Neck:      Vascular: No JVD.   Pulmonary:      Effort: Pulmonary effort is normal.      Breath sounds: Normal breath sounds.   Cardiovascular:      Normal rate. Regular rhythm. Normal S1. Normal S2.       Murmurs: There is no murmur.      No gallop.  No rub.   Pulses:     Intact distal pulses.   Edema:     Peripheral edema absent.   Skin:     General: Skin is warm and dry.   Neurological:      General: No focal deficit present.      Mental Status: Alert and oriented to person, place and time.   Psychiatric:         Behavior: Behavior is cooperative.       Diagnostic Data:  Lab Results   Component Value Date    GLUCOSE 118 (H) 05/19/2025    BUN 14 05/19/2025    CREATININE 0.81 05/19/2025     (L) 05/19/2025    K 4.3 05/19/2025    CL 95 (L) 05/19/2025    CALCIUM 8.3 (L) 05/19/2025    PROTEINTOT 6.4 05/19/2025    ALBUMIN 4.1 05/19/2025    ALT 39 (H) 05/19/2025    AST 36 (H) 05/19/2025    ALKPHOS 128 (H) 05/19/2025    BILITOT 0.7 05/19/2025    GLOB 2.3 05/19/2025    AGRATIO 1.8 05/19/2025    BCR 17.3 05/19/2025    ANIONGAP 14.0 05/19/2025    EGFR 78.7 05/19/2025      Lab Results   Component Value Date    WBC 3.00 (L) 05/19/2025    HGB 10.3 (L) 05/19/2025    HCT 30.5 (L) 05/19/2025    MCV 91.9 05/19/2025     05/19/2025    TSH pending    ECG/EMG Results (last 24 hours)       Procedure Component Value Units Date/Time    ECG 12 Lead Tachycardia [[id number]] Collected: 05/19/25 1633     Updated: 05/20/25 0647     QT Interval 372 ms      QTC Interval 455 ms     Narrative:      Test Reason : Tachycardia  Blood Pressure :   */*   mmHG  Vent. Rate :  90 BPM     Atrial Rate :  90 BPM     P-R Int : 134 ms          QRS Dur :  82 ms      QT Int : 372 ms       P-R-T Axes :  37  22  43 degrees    QTcB Int : 455 ms    Sinus rhythm with occasional  premature ventricular complexes  Otherwise normal ECG  When compared with ECG of 06-Feb-2025 14:39,  premature ventricular complexes are now present  Criteria for Septal infarct are no longer present    Referred By:            Confirmed By:              ASSESSMENT/PLAN:    Tachycardia    Hypothyroidism (acquired)    Primary pancreatic neuroendocrine tumor    Nausea and vomiting    Chronic low back pain    Asthma    Orthostatic tachycardia, reactive to volume depletion, anemia  Normal echo 2/2025  - Check limited echocardiogram to rule out right heart involvement and pericardial effusion  - Encouraged hydration, IV fluids as needed.  - Will follow up after echocardiogram reviewed.      Scribed for Thomas Zhu MD by Marialuisa Hua, APRN. 5/20/2025  11:00 EDT

## 2025-05-20 NOTE — PROGRESS NOTES
"          Clinical Nutrition Assessment     Patient Name: Arely Mesa  YOB: 1955  MRN: [id number]  Date of Encounter: 25 14:40 EDT  Admission date: 2025  Reason for Visit: MST score 2+, Reduced oral intake, \"Unsure\" unintentional weight loss    Assessment   Nutrition Assessment   Admission Diagnosis:  Tachycardia [R00.0]    Problem List:    Tachycardia    Hypothyroidism (acquired)    Primary pancreatic neuroendocrine tumor    Nausea and vomiting    Chronic low back pain    Asthma      PMH:   She  has a past medical history of Asthma (), Fibroids, Genital HSV (), Hypercholesteremia (), Hypothyroid (), Meniere disease, right along with tinnitus (), Tubular adenoma of colon (2015), and Tubular adenoma of colon (2023).    PSH:  She  has a past surgical history that includes Tonsillectomy (1964);  section (1985); Rhinoplasty (1990); Trigger finger release (Left, 2013); and ORIF patella fracture (Left, 2018).    Applicable Nutrition History:       Anthropometrics     Height:  165.1cm  Last Filed Weight:  60.3kg (133lb)  Method:  ?  BMI:  22.13 kg/m^2    UBW:     Weight      Weight (kg) Weight (lbs) Weight Method   2024 62.143 kg  137 lb     2025 59.875 kg  132 lb  Stated    2025 59.421 kg  131 lb     2025 59 kg  130 lb 1.1 oz     2025 57.607 kg  127 lb  Standing scale    2025 59.421 kg  131 lb     3/24/2025 60.782 kg  134 lb     3/27/2025 60.782 kg  134 lb     2025 59.875 kg  132 lb     2025 60.328 kg  133 lb       Weight change: No significant changes    Nutrition Focused Physical Exam    Date:         Patient meets criteria for malnutrition diagnosis, see MSA note.     Subjective   Reported/Observed/Food/Nutrition Related History:     Pt up in bed at time of visit, able to provide weight/nutrition. Endorsed poor appetite with onset of BP challenges. Reports no real difficulty with PO intake during " recent round of oral chemo however on initial round noted unintentional loss of 6# - reports can feel increased weakness. Reports mainly having difficulty eating at breakfast but has been supplementing with protein shakes appropriately. Denies dysphagia - noted metallic taste with some ONS but resolved when mixed with ice cream. NKFA    Current Nutrition Prescription   PO: Diet: Regular/House, Gastrointestinal; Low Irritant; Fluid Consistency: Thin (IDDSI 0)  Oral Nutrition Supplement: N/A  Intake:  75% of lunch tray observed    Assessment & Plan   Nutrition Diagnosis   Date: 5/20  Updated:  Problem Malnutrition, acute severe   Etiology Energy in < energy out   Signs/Symptoms moderate muscle wasting and moderate subcutaneous fat loss   Status: New    Goal:   Nutrition to support treatment and Increase intake      Nutrition Intervention      Follow treatment progress, Care plan reviewed, Encourage intake, Supplement provided    Encouraged PO intake at meals as tolerated  Provide [place] Instant Breakfast q AM mixed with chocolate milk    Monitoring/Evaluation:   Per protocol, I&O, PO intake, Supplement intake, Pertinent labs, Symptoms, POC/GOC    Irina Morales, MS,RD,LD  Time Spent: 25min

## 2025-05-20 NOTE — PLAN OF CARE
Goal Outcome Evaluation:  Plan of Care Reviewed With: patient        Progress: no change  Outcome Evaluation: PT initial eval completed. Pt presents below baseline with decreased endurance and elevated HR. Ambulation of 400' with CGA and no AD was well tolerated. Further IPPT is warrented. PT rec d/c home w/ assist as needed when medically appropriate.    Anticipated Discharge Disposition (PT): home with assist

## 2025-05-21 ENCOUNTER — TELEPHONE (OUTPATIENT)
Dept: CARDIOLOGY | Facility: CLINIC | Age: 70
End: 2025-05-21

## 2025-05-21 ENCOUNTER — READMISSION MANAGEMENT (OUTPATIENT)
Dept: CALL CENTER | Facility: HOSPITAL | Age: 70
End: 2025-05-21
Payer: MEDICARE

## 2025-05-21 VITALS
BODY MASS INDEX: 22.15 KG/M2 | OXYGEN SATURATION: 94 % | TEMPERATURE: 98.2 F | HEART RATE: 86 BPM | SYSTOLIC BLOOD PRESSURE: 113 MMHG | DIASTOLIC BLOOD PRESSURE: 70 MMHG | WEIGHT: 132.94 LBS | HEIGHT: 65 IN | RESPIRATION RATE: 16 BRPM

## 2025-05-21 PROBLEM — E43 SEVERE PROTEIN-CALORIE MALNUTRITION: Status: ACTIVE | Noted: 2025-05-21

## 2025-05-21 LAB
ALBUMIN SERPL-MCNC: 3.3 G/DL (ref 3.5–5.2)
ALBUMIN/GLOB SERPL: 1.7 G/DL
ALP SERPL-CCNC: 101 U/L (ref 39–117)
ALT SERPL W P-5'-P-CCNC: 37 U/L (ref 1–33)
ANION GAP SERPL CALCULATED.3IONS-SCNC: 10 MMOL/L (ref 5–15)
AST SERPL-CCNC: 30 U/L (ref 1–32)
BASOPHILS # BLD AUTO: 0.01 10*3/MM3 (ref 0–0.2)
BASOPHILS NFR BLD AUTO: 0.4 % (ref 0–1.5)
BH CV ECHO LEFT VENTRICLE GLOBAL LONGITUDINAL STRAIN: -20 %
BH CV ECHO MEAS - EDV(CUBED): 64 ML
BH CV ECHO MEAS - EDV(MOD-SP2): 61.4 ML
BH CV ECHO MEAS - EDV(MOD-SP4): 71.6 ML
BH CV ECHO MEAS - EF(MOD-SP2): 60.7 %
BH CV ECHO MEAS - EF(MOD-SP4): 71.6 %
BH CV ECHO MEAS - ESV(CUBED): 19.7 ML
BH CV ECHO MEAS - ESV(MOD-SP2): 24.1 ML
BH CV ECHO MEAS - ESV(MOD-SP4): 20.3 ML
BH CV ECHO MEAS - FS: 32.5 %
BH CV ECHO MEAS - IVS/LVPW: 1 CM
BH CV ECHO MEAS - IVSD: 0.7 CM
BH CV ECHO MEAS - LV DIASTOLIC VOL/BSA (35-75): 43 CM2
BH CV ECHO MEAS - LV MASS(C)D: 78.4 GRAMS
BH CV ECHO MEAS - LV SYSTOLIC VOL/BSA (12-30): 12.2 CM2
BH CV ECHO MEAS - LVIDD: 4 CM
BH CV ECHO MEAS - LVIDS: 2.7 CM
BH CV ECHO MEAS - LVPWD: 0.7 CM
BH CV ECHO MEAS - RAP SYSTOLE: 8 MMHG
BH CV ECHO MEAS - RVSP: 37 MMHG
BH CV ECHO MEAS - SV(MOD-SP2): 37.3 ML
BH CV ECHO MEAS - SV(MOD-SP4): 51.3 ML
BH CV ECHO MEAS - SVI(MOD-SP2): 22.4 ML/M2
BH CV ECHO MEAS - SVI(MOD-SP4): 30.8 ML/M2
BH CV ECHO MEAS - TR MAX PG: 29 MMHG
BH CV ECHO MEAS - TR MAX VEL: 259.5 CM/SEC
BH CV VAS BP RIGHT ARM: NORMAL MMHG
BH CV XLRA - RV BASE: 2.6 CM
BH CV XLRA - RV LENGTH: 6.4 CM
BH CV XLRA - RV MID: 2.6 CM
BILIRUB SERPL-MCNC: 0.4 MG/DL (ref 0–1.2)
BUN SERPL-MCNC: 8 MG/DL (ref 8–23)
BUN/CREAT SERPL: 11.1 (ref 7–25)
CALCIUM SPEC-SCNC: 7.9 MG/DL (ref 8.6–10.5)
CHLORIDE SERPL-SCNC: 101 MMOL/L (ref 98–107)
CO2 SERPL-SCNC: 21 MMOL/L (ref 22–29)
CREAT SERPL-MCNC: 0.72 MG/DL (ref 0.57–1)
CYTOLOGIST CVX/VAG CYTO: NORMAL
DEPRECATED RDW RBC AUTO: 47.2 FL (ref 37–54)
EGFRCR SERPLBLD CKD-EPI 2021: 90.6 ML/MIN/1.73
EOSINOPHIL # BLD AUTO: 0.12 10*3/MM3 (ref 0–0.4)
EOSINOPHIL NFR BLD AUTO: 4.8 % (ref 0.3–6.2)
ERYTHROCYTE [DISTWIDTH] IN BLOOD BY AUTOMATED COUNT: 15.4 % (ref 12.3–15.4)
GLOBULIN UR ELPH-MCNC: 2 GM/DL
GLUCOSE SERPL-MCNC: 89 MG/DL (ref 65–99)
HCT VFR BLD AUTO: 26.1 % (ref 34–46.6)
HGB BLD-MCNC: 8.7 G/DL (ref 12–15.9)
IMM GRANULOCYTES # BLD AUTO: 0.02 10*3/MM3 (ref 0–0.05)
IMM GRANULOCYTES NFR BLD AUTO: 0.8 % (ref 0–0.5)
LV EF BIPLANE MOD: 67.4 %
LYMPHOCYTES # BLD AUTO: 0.85 10*3/MM3 (ref 0.7–3.1)
LYMPHOCYTES NFR BLD AUTO: 34.3 % (ref 19.6–45.3)
MCH RBC QN AUTO: 31.1 PG (ref 26.6–33)
MCHC RBC AUTO-ENTMCNC: 33.3 G/DL (ref 31.5–35.7)
MCV RBC AUTO: 93.2 FL (ref 79–97)
MONOCYTES # BLD AUTO: 0.36 10*3/MM3 (ref 0.1–0.9)
MONOCYTES NFR BLD AUTO: 14.5 % (ref 5–12)
NEUTROPHILS NFR BLD AUTO: 1.12 10*3/MM3 (ref 1.7–7)
NEUTROPHILS NFR BLD AUTO: 45.2 % (ref 42.7–76)
NRBC BLD AUTO-RTO: 0 /100 WBC (ref 0–0.2)
PATH INTERP BLD-IMP: NORMAL
PLATELET # BLD AUTO: 304 10*3/MM3 (ref 140–450)
PMV BLD AUTO: 8.6 FL (ref 6–12)
POTASSIUM SERPL-SCNC: 4.1 MMOL/L (ref 3.5–5.2)
PROT SERPL-MCNC: 5.3 G/DL (ref 6–8.5)
RBC # BLD AUTO: 2.8 10*6/MM3 (ref 3.77–5.28)
SODIUM SERPL-SCNC: 132 MMOL/L (ref 136–145)
WBC NRBC COR # BLD AUTO: 2.48 10*3/MM3 (ref 3.4–10.8)

## 2025-05-21 PROCEDURE — 99232 SBSQ HOSP IP/OBS MODERATE 35: CPT

## 2025-05-21 PROCEDURE — 25010000002 ENOXAPARIN PER 10 MG: Performed by: NURSE PRACTITIONER

## 2025-05-21 PROCEDURE — 80053 COMPREHEN METABOLIC PANEL: CPT | Performed by: INTERNAL MEDICINE

## 2025-05-21 PROCEDURE — 85025 COMPLETE CBC W/AUTO DIFF WBC: CPT | Performed by: INTERNAL MEDICINE

## 2025-05-21 PROCEDURE — 25810000003 SODIUM CHLORIDE 0.9 % SOLUTION: Performed by: INTERNAL MEDICINE

## 2025-05-21 PROCEDURE — 99239 HOSP IP/OBS DSCHRG MGMT >30: CPT | Performed by: INTERNAL MEDICINE

## 2025-05-21 RX ORDER — OXYCODONE AND ACETAMINOPHEN 5; 325 MG/1; MG/1
1 TABLET ORAL ONCE
Refills: 0 | Status: COMPLETED | OUTPATIENT
Start: 2025-05-21 | End: 2025-05-21

## 2025-05-21 RX ADMIN — Medication 10 ML: at 10:20

## 2025-05-21 RX ADMIN — FLUTICASONE PROPIONATE 2 SPRAY: 50 SPRAY, METERED NASAL at 10:25

## 2025-05-21 RX ADMIN — LEVOTHYROXINE SODIUM 75 MCG: 0.07 TABLET ORAL at 06:28

## 2025-05-21 RX ADMIN — OXYCODONE HYDROCHLORIDE AND ACETAMINOPHEN 1 TABLET: 5; 325 TABLET ORAL at 06:26

## 2025-05-21 RX ADMIN — OXYCODONE HYDROCHLORIDE AND ACETAMINOPHEN 1 TABLET: 5; 325 TABLET ORAL at 06:27

## 2025-05-21 RX ADMIN — SODIUM CHLORIDE 100 ML/HR: 9 INJECTION, SOLUTION INTRAVENOUS at 05:44

## 2025-05-21 RX ADMIN — FAMOTIDINE 40 MG: 20 TABLET, FILM COATED ORAL at 10:20

## 2025-05-21 RX ADMIN — OXYCODONE HYDROCHLORIDE AND ACETAMINOPHEN 1 TABLET: 5; 325 TABLET ORAL at 01:01

## 2025-05-21 RX ADMIN — ENOXAPARIN SODIUM 40 MG: 100 INJECTION SUBCUTANEOUS at 10:20

## 2025-05-21 RX ADMIN — CETIRIZINE HYDROCHLORIDE 10 MG: 10 TABLET, FILM COATED ORAL at 10:20

## 2025-05-21 RX ADMIN — SENNOSIDES, DOCUSATE SODIUM 2 TABLET: 50; 8.6 TABLET, FILM COATED ORAL at 10:20

## 2025-05-21 NOTE — PROGRESS NOTES
"  [facility]  PROGRESS NOTE    Date of Admission: 5/19/2025  Date of Service: 05/21/25    Primary Care Physician: Mohini Baeza MD    Chief Complaint: Tachycardia, dizziness  Problem List:   Tachycardia    Hypothyroidism (acquired)    Primary pancreatic neuroendocrine tumor    Nausea and vomiting    Chronic low back pain    Asthma    Severe protein-calorie malnutrition      Subjective      HPI: No acute events overnight.  Patient sitting in bed comfortably, states feeling much better today after IV fluids and increased hydration.  Heart rate mildly elevated this morning but improving with p.o. intake.  Heart rate in the 80s on telemetry.      Objective   Vitals: /81 (BP Location: Right arm, Patient Position: Lying)   Pulse 92   Temp 98 °F (36.7 °C) (Oral)   Resp 16   Ht 165.1 cm (65\")   Wt 60.3 kg (132 lb 15 oz)   LMP 02/23/2014 (Approximate)   SpO2 91%   BMI 22.12 kg/m²     Physical Exam:  GENERAL: Alert, cooperative, in no acute distress.   HEART: Regular rhythm, normal rate, and no murmurs, gallops, or rubs.   LUNGS: Clear to auscultation bilaterally. No wheezing, rales or rhonchi.  EXTREMITIES: No clubbing, cyanosis, or edema noted.     Results:  Results from last 7 days   Lab Units 05/21/25  0801 05/20/25  1716 05/19/25  1300   WBC 10*3/mm3 2.48* 2.99* 3.00*   HEMOGLOBIN g/dL 8.7* 8.2* 10.3*   HEMATOCRIT % 26.1* 25.8* 30.5*   PLATELETS 10*3/mm3 304 278 259     Results from last 7 days   Lab Units 05/21/25  0801 05/20/25  1716 05/19/25  1305   SODIUM mmol/L 132* 129* 131*   POTASSIUM mmol/L 4.1 3.8 4.3   CHLORIDE mmol/L 101 99 95*   CO2 mmol/L 21.0* 21.0* 22.0   BUN mg/dL 8 12 14   CREATININE mg/dL 0.72 0.75 0.81   GLUCOSE mg/dL 89 127* 118*      Lab Results   Component Value Date    TRIG 86 12/01/2021    HDL 81 12/01/2021    .8 (H) 12/01/2021    AST 30 05/21/2025    ALT 37 (H) 05/21/2025             Results from last 7 days   Lab Units 05/20/25  1716 "   TSH uIU/mL 1.450                         Intake/Output Summary (Last 24 hours) at 5/21/2025 0920  Last data filed at 5/21/2025 0544  Gross per 24 hour   Intake 2013.33 ml   Output 2700 ml   Net -686.67 ml       I personally reviewed the patient's EKG/Telemetry data    Radiology Data:   Results for orders placed during the hospital encounter of 05/19/25    Adult Transthoracic Echo Limited W/ Cont if Necessary Per Protocol    Interpretation Summary    Left ventricular systolic function is normal. Calculated left ventricular EF = 67.4%    Normal valvular echoes    GLS -20%(wnl)    RVSP <35mmHg        Current Medications:  cetirizine, 10 mg, Oral, Daily  dapsone, 100 mg, Oral, Nightly  enoxaparin sodium, 40 mg, Subcutaneous, Daily  famotidine, 40 mg, Oral, Daily  fluticasone, 2 spray, Nasal, Daily  levothyroxine, 75 mcg, Oral, Daily  melatonin, 10 mg, Oral, Nightly  montelukast, 10 mg, Oral, Nightly  oxybutynin XL, 5 mg, Oral, Daily  sennosides-docusate, 2 tablet, Oral, BID  sodium chloride, 10 mL, Intravenous, Q12H      sodium chloride, 100 mL/hr, Last Rate: 100 mL/hr (05/21/25 0544)        Assessment:   Orthostatic tachycardia  Occurring in the setting of volume depletion/anemia  Normal echocardiogram 2/20/2025 and 5/19/2025  Stage IV pancreatic cancer  Hyponatremia, mild  Anemia, hgb 8.7    Plan:   Heart rate improving with IV fluids and increase p.o. intake.  Discussed importance of maintaining adequate hydration.    Maintain hemoglobin greater than 8.  Echocardiogram unremarkable.  Okay to discharge home from a cardiac standpoint.  Follow-up with Dr. Zhu in 6 weeks as an outpatient.      Anisha Watson PA-C

## 2025-05-21 NOTE — TELEPHONE ENCOUNTER
"Caller: Arely Mesa \"Izzy\"    Relationship to patient: Self    Best call back number: [phone]     New or established patient?  [x] New  [] Established    Date of discharge: 05/21/25    Facility discharged from: [facility]     Diagnosis/Symptoms: TACHYCARDIA     Length of stay (If applicable): 05/19/25-05/21/25    Specialty Only: Did you see a [facility] provider?    [x] Yes  [] No  If so, who? DR. EM     Additional Details: PATIENT WAS DISCHARGED FROM [facility] ON 05/21/25 AND WAS ADVISED TO FOLLOW UP WITH DR. EM IN 6 WEEKS FOR A HOSPITAL FOLLOW UP. PATIENT REPORTS THAT SHE IS FEELING A LITTLE BETTER SINCE BEING DISCHARGED FROM THE HOSPITAL BUT ISN'T 100%  "

## 2025-05-21 NOTE — CASE MANAGEMENT/SOCIAL WORK
Continued Stay Note   [facility]     Patient Name: Arely Mesa  MRN: [id number]  Today's Date: 5/21/2025    Admit Date: 5/19/2025    Plan: home   Discharge Plan       Row Name 05/21/25 0915       Plan    Plan home    Patient/Family in Agreement with Plan yes    Plan Comments I met with this patient bedside. Her plan remains home with her family to transport. CM to follow.    Final Discharge Disposition Code 01 - home or self-care                   Discharge Codes    No documentation.                 Expected Discharge Date and Time       Expected Discharge Date Expected Discharge Time    May 21, 2025               Maddy Smyth RN

## 2025-05-21 NOTE — CASE MANAGEMENT/SOCIAL WORK
Continued Stay Note  [facility]     Patient Name: Arely Mesa  MRN: [id number]  Today's Date: 5/21/2025    Admit Date: 5/19/2025    Plan: home   Discharge Plan       Row Name 05/21/25 1139       Plan    Plan home    Patient/Family in Agreement with Plan yes    Plan Comments I met with this patient regarding her discharge home today. She is in agreement, and her family can transport. She denies having any further discharge planning needs. The IMM form was completed and noted in EPIC.    Final Discharge Disposition Code 01 - home or self-care      Row Name 05/21/25 0915       Plan    Plan home    Patient/Family in Agreement with Plan yes    Plan Comments I met with this patient bedside. Her plan remains home with her family to transport. CM to follow.    Final Discharge Disposition Code 01 - home or self-care                   Discharge Codes    No documentation.                 Expected Discharge Date and Time       Expected Discharge Date Expected Discharge Time    May 21, 2025               Maddy Smyth RN

## 2025-05-21 NOTE — DISCHARGE SUMMARY
"    [facility] Medicine Services  DISCHARGE SUMMARY    Patient Name: Arely Mesa  : 1955  MRN: [id number]    Date of Admission: 2025  3:42 PM  Date of Discharge:  2025  Primary Care Physician: Mohini Baeza MD    Consults       Date and Time Order Name Status Description    2025  4:57 PM Inpatient Hematology & Oncology Consult Completed     2025  4:57 PM Inpatient Cardiology Consult Completed             Hospital Course     Presenting Problem: tachycardia    Active Hospital Problems    Diagnosis  POA    **Tachycardia [R00.0]  Unknown    Severe protein-calorie malnutrition [E43]  Yes    Nausea and vomiting [R11.2]  Unknown    Chronic low back pain [M54.50, G89.29]  Unknown    Asthma [J45.909]  Unknown    Primary pancreatic neuroendocrine tumor [D3A.8]  Yes    Hypothyroidism (acquired) [E03.9]  Yes      Resolved Hospital Problems   No resolved problems to display.          Hospital Course:  Arely Mesa is a 69 y.o. female with PMH of hypothyroidism, hypercholesterolemia, exercise-induced asthma and stage IV pancreatic neuroendocrine tumor on chemo recently completed a 14-day cycle of Xeloda and Temodar (cycle 2) who presented with dizziness and tachycardia.        Dizziness  Tachycardia  - Reports experiencing dizziness when standing up, which is relieved by lying down. This symptom began after an epidural injection on . Her heart rate has been elevated, reaching 130 bpm with minimal exertion. Blood pressure has remained stable, but she has experienced significant lightheadedness and a \"swimmy\" head sensation.  -- suspect secondary to steroid injection as well as dehydration due to emesis post-procedure vs chemo side effect.   - Echo completed 2025, with normal structure and normal EF. Repeat TTE this admission showed normal EF, no evidence of pericardial effusion, no R heart issues.  -Cardiology consulted due to the potential side effect of " coronary vasospasm from her chemotherapy treatment.  -- s/p  IVFs  -- Phenergan, Zofran, and dexamethasone PRN nausea while here. Can continue home meds upon d/c     Nausea and vomiting  - Currently improved after IV fluids, Zofran and dexamethasone     Stage IV pancreatic neuroendocrine tumor  - She recently completed a 14-day cycle of Xeloda and Temodar for her stage IV pancreatic neuroendocrine tumor. She experienced significant nausea during the first cycle but reports that it is now controllable after cycle 2.  She will continue her chemotherapy regimen as prescribed.  -Consulted Dr. Negrete to follow along     Anemia  Leukopenia  -- likely due to chemo  -- HgB around 10 on admission, dropped some after IVFs but is stable today at 8.7     Mild hyponatremia  -Not clinically significant, likely secondary to dehydration  -dropped slightly to 129 yesterday, repeat labs this am show improvement to 132, but, if improved will d/c home      Mildly elevated LFTs  -Does have hyperlipidemia, not on statin currently.  Likely due to Xeloda  -Currently without abdominal pain total bilirubin within normal limits     Chronic low back pain/L4-5 stenosis  - post epidural steroid injection 5/14/2025      Discharge Follow Up Recommendations for outpatient labs/diagnostics:   Follow up with PCP within one week  Follow up with Oncology as per their instructions     Day of Discharge     HPI:   Had a rough night because she was concerned about her labs (which were not drawn until the late afternoon and didn't result until after shift change).  She reports that she was so worried that she got a tension headache and couldn't sleep. Now feels like her heart is racing still.  Tylenol and Percocet helped her headache some but usually she has to take Naproxen at home for it go away.  States that it is better this am than it was last night.      Review of Systems  Gen- No fevers, chills  CV- No chest pain, + palpitations  Resp- No cough,  dyspnea  GI- No N/V/D, abd pain     Vital Signs:   Temp:  [98 °F (36.7 °C)-98.7 °F (37.1 °C)] 98 °F (36.7 °C)  Heart Rate:  [79-97] 92  Resp:  [16-18] 16  BP: ()/(62-81) 120/81      Physical Exam:  Constitutional: No acute distress, awake, alert  HENT: NCAT, mucous membranes moist  Respiratory: Clear to auscultation bilaterally, respiratory effort normal   Cardiovascular: RRR not currently on telemetry but pulse ~70, no murmurs, rubs, or gallops  Gastrointestinal: Positive bowel sounds, soft, nontender, nondistended  Musculoskeletal: No bilateral ankle edema  Psychiatric: Appropriate affect, cooperative  Neurologic: Oriented x 3, strength symmetric in all extremities, Cranial Nerves grossly intact to confrontation, speech clear  Skin: No rashes        Pertinent  and/or Most Recent Results     LAB RESULTS:      Lab 05/20/25  1717 05/20/25  1716 05/19/25  1300   WBC  --  2.99* 3.00*   HEMOGLOBIN  --  8.2* 10.3*   HEMATOCRIT  --  25.8* 30.5*   PLATELETS  --  278 259   NEUTROS ABS  --  1.77 1.95   IMMATURE GRANS (ABS)  --  0.01 0.01   LYMPHS ABS  --  0.80 0.70   MONOS ABS  --  0.29 0.26   EOS ABS  --  0.11 0.07   MCV  --  94.9 91.9   D DIMER QUANT 1.52*  --   --          Lab 05/20/25  1716 05/19/25  1305   SODIUM 129* 131*   POTASSIUM 3.8 4.3   CHLORIDE 99 95*   CO2 21.0* 22.0   ANION GAP 9.0 14.0   BUN 12 14   CREATININE 0.75 0.81   EGFR 86.3 78.7   GLUCOSE 127* 118*   CALCIUM 7.9* 8.3*   TSH 1.450  --          Lab 05/19/25  1305   TOTAL PROTEIN 6.4   ALBUMIN 4.1   GLOBULIN 2.3   ALT (SGPT) 39*   AST (SGOT) 36*   BILIRUBIN 0.7   ALK PHOS 128*                     Brief Urine Lab Results  (Last result in the past 365 days)        Color   Clarity   Blood   Leuk Est   Nitrite   Protein   CREAT   Urine HCG        02/06/25 1444 Yellow   Clear   Negative   Trace   Negative   30 mg/dL (1+)                 Microbiology Results (last 10 days)       ** No results found for the last 240 hours. **            Mammo Screening  Digital Tomosynthesis Bilateral With CAD  Result Date: 5/16/2025  EXAM: Mammography Breast Screening with Tomosynthesis REASON FOR EXAM: Screening Mammogram HISTORY: Patient is 69 y.o. Family medical history includes endometrial cancer in mother. Hormone history includes combination hormone replacement therapy (estrogen and progesterone).   COMPARISON STUDIES: Compared to: 03/04/2022 Mammography Breast Screening Tomosynthesis Bilateral at [facility] 03/30/2023 Mammography Breast Screening Tomosynthesis Bilateral at [facility] 04/12/2024 Mammography Breast Screening Tomosynthesis Bilateral at [facility] BREAST COMPOSITION: The breasts are heterogeneously dense, which may obscure small masses. FINDINGS: There are no suspicious masses, calcifications, or areas of architectural distortion.     No mammographic evidence of malignancy. BI-RADS CATEGORY: Overall: 1 - Negative RECOMMENDATION:       - Routine Screening Mammogram in 1 Year. Patient Lifetime Risk Score of Breast Malignancy: 5.8% This risk assessment is calculated using the Carmina Risk Assessment model which may underestimate the lifetime risk of breast malignancy. COMMUNICATION: Computer-aided detection (CAD) and tomosynthesis were utilized by the radiologist in the interpretation of this examination. The results and recommendations will be sent to the patient in a printed lay language version of the imaging report.                Results for orders placed during the hospital encounter of 05/19/25    Adult Transthoracic Echo Limited W/ Cont if Necessary Per Protocol    Interpretation Summary    Left ventricular systolic function is normal. Calculated left ventricular EF = 67.4%    Normal valvular echoes    GLS -20%(wnl)    RVSP <35mmHg      Plan for Follow-up of Pending Labs/Results:   Pending Labs       Order Current Status    CBC & Differential In process    CBC Auto Differential In process    Comprehensive Metabolic Panel In process     Peripheral Blood Smear In process          Discharge Details        Discharge Medications        Continue These Medications        Instructions Start Date   Calcium Citrate-Vitamin D3 315-6.25 MG-MCG tablet tablet  Commonly known as: CITRACAL   2 tablets, Daily      capecitabine 150 MG chemo tablet  Commonly known as: XELODA   300 mg, Oral, 2 Times Daily, On Days 1-14 then 14 days off of each 28-day cycle      capecitabine 500 MG chemo tablet  Commonly known as: XELODA   1,000 mg, Oral, 2 Times Daily, On Days 1-14 then 14 days off of each 28-day cycle      cetirizine 10 MG tablet  Commonly known as: zyrTEC   10 mg, Daily      cyclobenzaprine 10 MG tablet  Commonly known as: FLEXERIL   10 mg, Oral, 3 Times Daily PRN      cycloSPORINE 0.05 % ophthalmic emulsion  Commonly known as: RESTASIS   2 drops, Every 12 Hours      dapsone 100 MG tablet   100 mg, Oral, Daily      Duavee 0.45-20 MG tablet  Generic drug: Conj Estrogens-Bazedoxifene   1 each, Oral, Daily      fluticasone 50 MCG/ACT nasal spray  Commonly known as: FLONASE   2 sprays, Daily      glucosamine-chondroitin 500-400 MG per tablet   1 tablet, Daily      hydrocortisone 25 MG suppository  Commonly known as: ANUSOL-HC   25 mg, Rectal, 2 Times Daily PRN      levothyroxine 75 MCG tablet  Commonly known as: SYNTHROID, LEVOTHROID   75 mcg, Daily      melatonin 5 MG tablet tablet   10 mg, Nightly      Mirabegron ER 25 MG tablet sustained-release 24 hour 24 hr tablet  Commonly known as: Myrbetriq   25 mg, Oral, Daily      montelukast 10 MG tablet  Commonly known as: SINGULAIR   10 mg, Nightly      ondansetron ODT 8 MG disintegrating tablet  Commonly known as: ZOFRAN-ODT   8 mg, Translingual, Every 8 Hours PRN      oxyCODONE-acetaminophen 5-325 MG per tablet  Commonly known as: Percocet   1 tablet, Oral, Every 6 Hours PRN      polycarbophil 625 MG tablet tablet   625 mg, Daily      promethazine 25 MG tablet  Commonly known as: PHENERGAN   25 mg, Oral, Every 8 Hours  PRN      promethazine 25 MG suppository  Commonly known as: PHENERGAN   25 mg, Rectal, Every 8 Hours PRN      sennosides-docusate 8.6-50 MG per tablet  Commonly known as: PERICOLACE   2 tablets, Oral, 2 Times Daily      temozolomide 140 MG chemo capsule  Commonly known as: TEMODAR   140 mg, Oral, Daily, On Days 10-14 of a 28-day cycle      temozolomide 180 MG chemo capsule  Commonly known as: TEMODAR   180 mg, Oral, Daily, On Days 10-14 of a 28-day cycle      TURMERIC PO   553 mg, Daily               Allergies   Allergen Reactions    Sulfa Antibiotics Itching and Rash    Hydrocodone-Acetaminophen Nausea And Vomiting         Discharge Disposition:      Diet:  Hospital:  Diet Order   Procedures    Diet: Regular/House, Gastrointestinal; Low Irritant; Fluid Consistency: Thin (IDDSI 0)            Activity:      Restrictions or Other Recommendations:         CODE STATUS:    Code Status and Medical Interventions: CPR (Attempt to Resuscitate); Full Support   Ordered at: 05/19/25 4101     Code Status (Patient has no pulse and is not breathing):    CPR (Attempt to Resuscitate)     Medical Interventions (Patient has pulse or is breathing):    Full Support     Level Of Support Discussed With:    Patient       Future Appointments   Date Time Provider Department Center   5/30/2025  9:45 AM Sam Negrete MD [facility]   3/24/2026  8:00 AM Linus Baxter MD [facility]                 Shaneka Crystal MD  05/21/25      Time Spent on Discharge:  I spent  35  minutes on this discharge activity which included: face-to-face encounter with the patient, reviewing the data in the system, coordination of the care with the nursing staff as well as consultants, documentation, and entering orders.

## 2025-05-22 NOTE — OUTREACH NOTE
Prep Survey      Flowsheet Row Responses   [facility]   Is LACE score < 7 ? No   Eligibility Readm Mgmt   Discharge diagnosis Tachycardia   Does the patient have one of the following disease processes/diagnoses(primary or secondary)? Other   Does the patient have Home health ordered? No   Is there a DME ordered? No   Prep survey completed? Yes            JULIANA SHRESTHA - Registered Nurse

## 2025-05-27 ENCOUNTER — TRANSCRIBE ORDERS (OUTPATIENT)
Dept: LAB | Facility: HOSPITAL | Age: 70
End: 2025-05-27
Payer: MEDICARE

## 2025-05-27 ENCOUNTER — LAB (OUTPATIENT)
Dept: LAB | Facility: HOSPITAL | Age: 70
End: 2025-05-27
Payer: MEDICARE

## 2025-05-27 DIAGNOSIS — D3A.8 NEUROENDOCRINE TUMOR: ICD-10-CM

## 2025-05-27 DIAGNOSIS — Z79.899 ENCOUNTER FOR LONG-TERM (CURRENT) USE OF HIGH-RISK MEDICATION: ICD-10-CM

## 2025-05-27 DIAGNOSIS — D3A.8 NEUROENDOCRINE TUMOR: Primary | ICD-10-CM

## 2025-05-27 DIAGNOSIS — D3A.8 PRIMARY PANCREATIC NEUROENDOCRINE TUMOR: ICD-10-CM

## 2025-05-27 LAB
ALBUMIN SERPL-MCNC: 4.1 G/DL (ref 3.5–5.2)
ALBUMIN/GLOB SERPL: 1.8 G/DL
ALP SERPL-CCNC: 140 U/L (ref 39–117)
ALT SERPL W P-5'-P-CCNC: 73 U/L (ref 1–33)
ANION GAP SERPL CALCULATED.3IONS-SCNC: 8 MMOL/L (ref 5–15)
AST SERPL-CCNC: 43 U/L (ref 1–32)
BASOPHILS # BLD AUTO: 0.04 10*3/MM3 (ref 0–0.2)
BASOPHILS NFR BLD AUTO: 0.7 % (ref 0–1.5)
BILIRUB SERPL-MCNC: 0.6 MG/DL (ref 0–1.2)
BUN SERPL-MCNC: 13.1 MG/DL (ref 8–23)
BUN/CREAT SERPL: 17.9 (ref 7–25)
CALCIUM SPEC-SCNC: 8.9 MG/DL (ref 8.6–10.5)
CHLORIDE SERPL-SCNC: 98 MMOL/L (ref 98–107)
CO2 SERPL-SCNC: 26 MMOL/L (ref 22–29)
CREAT SERPL-MCNC: 0.73 MG/DL (ref 0.57–1)
CRP SERPL-MCNC: 0.56 MG/DL (ref 0–0.5)
DEPRECATED RDW RBC AUTO: 80.5 FL (ref 37–54)
EGFRCR SERPLBLD CKD-EPI 2021: 89.2 ML/MIN/1.73
EOSINOPHIL # BLD AUTO: 0.05 10*3/MM3 (ref 0–0.4)
EOSINOPHIL NFR BLD AUTO: 0.9 % (ref 0.3–6.2)
ERYTHROCYTE [DISTWIDTH] IN BLOOD BY AUTOMATED COUNT: 21.9 % (ref 12.3–15.4)
ERYTHROCYTE [SEDIMENTATION RATE] IN BLOOD: 5 MM/HR (ref 0–30)
GLOBULIN UR ELPH-MCNC: 2.3 GM/DL
GLUCOSE SERPL-MCNC: 102 MG/DL (ref 65–99)
HCT VFR BLD AUTO: 30.4 % (ref 34–46.6)
HGB BLD-MCNC: 9.9 G/DL (ref 12–15.9)
IMM GRANULOCYTES # BLD AUTO: 0.1 10*3/MM3 (ref 0–0.05)
IMM GRANULOCYTES NFR BLD AUTO: 1.8 % (ref 0–0.5)
LYMPHOCYTES # BLD AUTO: 1.7 10*3/MM3 (ref 0.7–3.1)
LYMPHOCYTES NFR BLD AUTO: 30.7 % (ref 19.6–45.3)
MCH RBC QN AUTO: 32.9 PG (ref 26.6–33)
MCHC RBC AUTO-ENTMCNC: 32.6 G/DL (ref 31.5–35.7)
MCV RBC AUTO: 101 FL (ref 79–97)
MONOCYTES # BLD AUTO: 0.91 10*3/MM3 (ref 0.1–0.9)
MONOCYTES NFR BLD AUTO: 16.4 % (ref 5–12)
NEUTROPHILS NFR BLD AUTO: 2.74 10*3/MM3 (ref 1.7–7)
NEUTROPHILS NFR BLD AUTO: 49.5 % (ref 42.7–76)
PLATELET # BLD AUTO: 379 10*3/MM3 (ref 140–450)
PMV BLD AUTO: 8.2 FL (ref 6–12)
POTASSIUM SERPL-SCNC: 4.4 MMOL/L (ref 3.5–5.2)
PROT SERPL-MCNC: 6.4 G/DL (ref 6–8.5)
RBC # BLD AUTO: 3.01 10*6/MM3 (ref 3.77–5.28)
SODIUM SERPL-SCNC: 132 MMOL/L (ref 136–145)
WBC NRBC COR # BLD AUTO: 5.54 10*3/MM3 (ref 3.4–10.8)

## 2025-05-27 PROCEDURE — 85025 COMPLETE CBC W/AUTO DIFF WBC: CPT

## 2025-05-27 PROCEDURE — 36415 COLL VENOUS BLD VENIPUNCTURE: CPT

## 2025-05-27 PROCEDURE — 85652 RBC SED RATE AUTOMATED: CPT

## 2025-05-27 PROCEDURE — 80053 COMPREHEN METABOLIC PANEL: CPT

## 2025-05-27 PROCEDURE — 86140 C-REACTIVE PROTEIN: CPT

## 2025-05-28 ENCOUNTER — READMISSION MANAGEMENT (OUTPATIENT)
Dept: CALL CENTER | Facility: HOSPITAL | Age: 70
End: 2025-05-28
Payer: MEDICARE

## 2025-05-28 ENCOUNTER — TELEPHONE (OUTPATIENT)
Dept: CARDIOLOGY | Facility: CLINIC | Age: 70
End: 2025-05-28
Payer: MEDICARE

## 2025-05-28 DIAGNOSIS — Z79.899 ENCOUNTER FOR LONG-TERM (CURRENT) USE OF HIGH-RISK MEDICATION: Primary | ICD-10-CM

## 2025-05-28 DIAGNOSIS — D3A.8 PRIMARY PANCREATIC NEUROENDOCRINE TUMOR: ICD-10-CM

## 2025-05-28 DIAGNOSIS — R00.0 TACHYCARDIA: Primary | ICD-10-CM

## 2025-05-28 NOTE — TELEPHONE ENCOUNTER
Pt called reporting her HR has continued to jump to the 140s upon standing. She has been also having some instances for the last three days where her apple watch will read her HR in the 40s and she will feel very bad. She reports her BP has been stable at 112/78. She has been focusing on consuming plenty of fluids. She denies any other cardiopulmonary symptoms. Her appointment was scheduled for 7/3/25. Please advise any further recommendations.

## 2025-05-28 NOTE — OUTREACH NOTE
Medical Week 1 Survey      Flowsheet Row Responses   [facility] patient discharged from? [facility]   Does the patient have one of the following disease processes/diagnoses(primary or secondary)? Other   Week 1 attempt successful? 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 N/A   Does the patient have a primary care provider?  Yes   Does the patient have an appointment with their PCP within 7 days of discharge? Yes   Has the patient kept scheduled appointments due by today? Yes   Comments The patient reports that she continues having elevated heart rates at rest and standing upright. Pt's HR range since DC has been 40's-140's, when her HR is >130 she does c/o SOA. Patient denies dizziness. The patient states that she is tolerating po intake, avoids stimulants and maintaining hydration. Patient is voiding WNL. The patient is waiting on return call from Cardiology.   Did the patient receive a copy of their discharge instructions? Yes   Nursing interventions Reviewed instructions with patient   What is the patient's perception of their health status since discharge? Same   Is the patient/caregiver able to teach back signs and symptoms related to disease process for when to call PCP? Yes   Is the patient/caregiver able to teach back signs and symptoms related to disease process for when to call 911? Yes   Week 1 call completed? Yes   Call end time 1214            Rachel GONZALEZ - Registered Nurse

## 2025-05-30 ENCOUNTER — LAB (OUTPATIENT)
Dept: LAB | Facility: HOSPITAL | Age: 70
End: 2025-05-30
Payer: MEDICARE

## 2025-05-30 ENCOUNTER — SPECIALTY PHARMACY (OUTPATIENT)
Dept: ONCOLOGY | Facility: HOSPITAL | Age: 70
End: 2025-05-30
Payer: MEDICARE

## 2025-05-30 ENCOUNTER — OFFICE VISIT (OUTPATIENT)
Dept: ONCOLOGY | Facility: CLINIC | Age: 70
End: 2025-05-30
Payer: MEDICARE

## 2025-05-30 ENCOUNTER — TELEPHONE (OUTPATIENT)
Dept: CARDIOLOGY | Facility: CLINIC | Age: 70
End: 2025-05-30
Payer: MEDICARE

## 2025-05-30 VITALS
BODY MASS INDEX: 21.99 KG/M2 | WEIGHT: 132 LBS | TEMPERATURE: 97.2 F | HEIGHT: 65 IN | DIASTOLIC BLOOD PRESSURE: 80 MMHG | OXYGEN SATURATION: 99 % | HEART RATE: 76 BPM | SYSTOLIC BLOOD PRESSURE: 134 MMHG | RESPIRATION RATE: 16 BRPM

## 2025-05-30 DIAGNOSIS — C7A.1 MALIGNANT POORLY DIFFERENTIATED NEUROENDOCRINE CARCINOMA: ICD-10-CM

## 2025-05-30 DIAGNOSIS — D3A.8 PRIMARY PANCREATIC NEUROENDOCRINE TUMOR: Primary | Chronic | ICD-10-CM

## 2025-05-30 DIAGNOSIS — D3A.8 PRIMARY PANCREATIC NEUROENDOCRINE TUMOR: ICD-10-CM

## 2025-05-30 DIAGNOSIS — Z79.899 ENCOUNTER FOR LONG-TERM (CURRENT) USE OF HIGH-RISK MEDICATION: ICD-10-CM

## 2025-05-30 DIAGNOSIS — D3A.8 PRIMARY PANCREATIC NEUROENDOCRINE TUMOR: Primary | ICD-10-CM

## 2025-05-30 LAB
ALBUMIN SERPL-MCNC: 3.9 G/DL (ref 3.5–5.2)
ALBUMIN/GLOB SERPL: 1.6 G/DL
ALP SERPL-CCNC: 146 U/L (ref 39–117)
ALT SERPL W P-5'-P-CCNC: 48 U/L (ref 1–33)
ANION GAP SERPL CALCULATED.3IONS-SCNC: 9 MMOL/L (ref 5–15)
AST SERPL-CCNC: 30 U/L (ref 1–32)
BASOPHILS # BLD AUTO: 0.02 10*3/MM3 (ref 0–0.2)
BASOPHILS NFR BLD AUTO: 0.4 % (ref 0–1.5)
BILIRUB SERPL-MCNC: 0.6 MG/DL (ref 0–1.2)
BUN SERPL-MCNC: 12.7 MG/DL (ref 8–23)
BUN/CREAT SERPL: 17.4 (ref 7–25)
CALCIUM SPEC-SCNC: 9 MG/DL (ref 8.6–10.5)
CHLORIDE SERPL-SCNC: 98 MMOL/L (ref 98–107)
CO2 SERPL-SCNC: 25 MMOL/L (ref 22–29)
CREAT SERPL-MCNC: 0.73 MG/DL (ref 0.57–1)
DEPRECATED RDW RBC AUTO: 81.8 FL (ref 37–54)
EGFRCR SERPLBLD CKD-EPI 2021: 89.2 ML/MIN/1.73
EOSINOPHIL # BLD AUTO: 0.08 10*3/MM3 (ref 0–0.4)
EOSINOPHIL NFR BLD AUTO: 1.8 % (ref 0.3–6.2)
ERYTHROCYTE [DISTWIDTH] IN BLOOD BY AUTOMATED COUNT: 22.2 % (ref 12.3–15.4)
GLOBULIN UR ELPH-MCNC: 2.4 GM/DL
GLUCOSE SERPL-MCNC: 102 MG/DL (ref 65–99)
HCT VFR BLD AUTO: 28.2 % (ref 34–46.6)
HGB BLD-MCNC: 9.3 G/DL (ref 12–15.9)
IMM GRANULOCYTES # BLD AUTO: 0.04 10*3/MM3 (ref 0–0.05)
IMM GRANULOCYTES NFR BLD AUTO: 0.9 % (ref 0–0.5)
LYMPHOCYTES # BLD AUTO: 1.23 10*3/MM3 (ref 0.7–3.1)
LYMPHOCYTES NFR BLD AUTO: 27.4 % (ref 19.6–45.3)
MCH RBC QN AUTO: 33.9 PG (ref 26.6–33)
MCHC RBC AUTO-ENTMCNC: 33 G/DL (ref 31.5–35.7)
MCV RBC AUTO: 102.9 FL (ref 79–97)
MONOCYTES # BLD AUTO: 0.74 10*3/MM3 (ref 0.1–0.9)
MONOCYTES NFR BLD AUTO: 16.5 % (ref 5–12)
NEUTROPHILS NFR BLD AUTO: 2.38 10*3/MM3 (ref 1.7–7)
NEUTROPHILS NFR BLD AUTO: 53 % (ref 42.7–76)
PLATELET # BLD AUTO: 274 10*3/MM3 (ref 140–450)
PMV BLD AUTO: 8.4 FL (ref 6–12)
POTASSIUM SERPL-SCNC: 4.7 MMOL/L (ref 3.5–5.2)
PROT SERPL-MCNC: 6.3 G/DL (ref 6–8.5)
RBC # BLD AUTO: 2.74 10*6/MM3 (ref 3.77–5.28)
SODIUM SERPL-SCNC: 132 MMOL/L (ref 136–145)
WBC NRBC COR # BLD AUTO: 4.49 10*3/MM3 (ref 3.4–10.8)

## 2025-05-30 PROCEDURE — 80053 COMPREHEN METABOLIC PANEL: CPT

## 2025-05-30 PROCEDURE — 85025 COMPLETE CBC W/AUTO DIFF WBC: CPT

## 2025-05-30 PROCEDURE — 36415 COLL VENOUS BLD VENIPUNCTURE: CPT

## 2025-05-30 RX ORDER — ONDANSETRON 2 MG/ML
8 INJECTION INTRAMUSCULAR; INTRAVENOUS ONCE
OUTPATIENT
Start: 2025-06-16

## 2025-05-30 RX ORDER — ONDANSETRON 2 MG/ML
8 INJECTION INTRAMUSCULAR; INTRAVENOUS ONCE
OUTPATIENT
Start: 2025-07-10

## 2025-05-30 RX ORDER — ONDANSETRON 2 MG/ML
8 INJECTION INTRAMUSCULAR; INTRAVENOUS ONCE
OUTPATIENT
Start: 2025-07-04

## 2025-05-30 RX ORDER — ONDANSETRON 2 MG/ML
8 INJECTION INTRAMUSCULAR; INTRAVENOUS ONCE
OUTPATIENT
Start: 2025-06-18

## 2025-05-30 RX ORDER — BISOPROLOL FUMARATE 5 MG/1
5 TABLET, FILM COATED ORAL DAILY PRN
Qty: 30 TABLET | Refills: 1 | Status: SHIPPED | OUTPATIENT
Start: 2025-05-30

## 2025-05-30 RX ORDER — ONDANSETRON 2 MG/ML
8 INJECTION INTRAMUSCULAR; INTRAVENOUS ONCE
OUTPATIENT
Start: 2025-06-27

## 2025-05-30 RX ORDER — ONDANSETRON 2 MG/ML
8 INJECTION INTRAMUSCULAR; INTRAVENOUS ONCE
OUTPATIENT
Start: 2025-06-09

## 2025-05-30 RX ORDER — ONDANSETRON 2 MG/ML
8 INJECTION INTRAMUSCULAR; INTRAVENOUS ONCE
OUTPATIENT
Start: 2025-06-19

## 2025-05-30 RX ORDER — ONDANSETRON 2 MG/ML
8 INJECTION INTRAMUSCULAR; INTRAVENOUS ONCE
OUTPATIENT
Start: 2025-07-09

## 2025-05-30 RX ORDER — ONDANSETRON 2 MG/ML
8 INJECTION INTRAMUSCULAR; INTRAVENOUS ONCE
OUTPATIENT
Start: 2025-06-24

## 2025-05-30 RX ORDER — ONDANSETRON 2 MG/ML
8 INJECTION INTRAMUSCULAR; INTRAVENOUS ONCE
OUTPATIENT
Start: 2025-06-23

## 2025-05-30 RX ORDER — ONDANSETRON 2 MG/ML
8 INJECTION INTRAMUSCULAR; INTRAVENOUS ONCE
OUTPATIENT
Start: 2025-06-20

## 2025-05-30 RX ORDER — ONDANSETRON 2 MG/ML
8 INJECTION INTRAMUSCULAR; INTRAVENOUS ONCE
OUTPATIENT
Start: 2025-06-25

## 2025-05-30 RX ORDER — ONDANSETRON 2 MG/ML
8 INJECTION INTRAMUSCULAR; INTRAVENOUS ONCE
OUTPATIENT
Start: 2025-06-17

## 2025-05-30 RX ORDER — ONDANSETRON 2 MG/ML
8 INJECTION INTRAMUSCULAR; INTRAVENOUS ONCE
OUTPATIENT
Start: 2025-07-11

## 2025-05-30 RX ORDER — ONDANSETRON 2 MG/ML
8 INJECTION INTRAMUSCULAR; INTRAVENOUS ONCE
OUTPATIENT
Start: 2025-07-01

## 2025-05-30 RX ORDER — ONDANSETRON 2 MG/ML
8 INJECTION INTRAMUSCULAR; INTRAVENOUS ONCE
OUTPATIENT
Start: 2025-07-03

## 2025-05-30 RX ORDER — ONDANSETRON 2 MG/ML
8 INJECTION INTRAMUSCULAR; INTRAVENOUS ONCE
OUTPATIENT
Start: 2025-06-12

## 2025-05-30 RX ORDER — ONDANSETRON 2 MG/ML
8 INJECTION INTRAMUSCULAR; INTRAVENOUS ONCE
OUTPATIENT
Start: 2025-06-30

## 2025-05-30 RX ORDER — ONDANSETRON 2 MG/ML
8 INJECTION INTRAMUSCULAR; INTRAVENOUS ONCE
OUTPATIENT
Start: 2025-06-26

## 2025-05-30 RX ORDER — CEPHALEXIN 500 MG/1
500 CAPSULE ORAL 3 TIMES DAILY
COMMUNITY
Start: 2025-05-28 | End: 2025-06-04

## 2025-05-30 RX ORDER — ONDANSETRON 2 MG/ML
8 INJECTION INTRAMUSCULAR; INTRAVENOUS ONCE
OUTPATIENT
Start: 2025-06-11

## 2025-05-30 RX ORDER — ONDANSETRON 2 MG/ML
8 INJECTION INTRAMUSCULAR; INTRAVENOUS ONCE
OUTPATIENT
Start: 2025-07-08

## 2025-05-30 RX ORDER — ONDANSETRON 2 MG/ML
8 INJECTION INTRAMUSCULAR; INTRAVENOUS ONCE
OUTPATIENT
Start: 2025-07-07

## 2025-05-30 RX ORDER — ONDANSETRON 2 MG/ML
8 INJECTION INTRAMUSCULAR; INTRAVENOUS ONCE
OUTPATIENT
Start: 2025-06-10

## 2025-05-30 RX ORDER — ONDANSETRON 2 MG/ML
8 INJECTION INTRAMUSCULAR; INTRAVENOUS ONCE
OUTPATIENT
Start: 2025-06-13

## 2025-05-30 RX ORDER — ONDANSETRON 2 MG/ML
8 INJECTION INTRAMUSCULAR; INTRAVENOUS ONCE
OUTPATIENT
Start: 2025-07-02

## 2025-05-30 NOTE — TELEPHONE ENCOUNTER
Contacted patient about bisoprolol and discussed about orthostatic hypotension precautions. Patient verbalizes understanding.

## 2025-05-30 NOTE — LETTER
May 30, 2025     Mohini Baeza MD  [address] [facility] [address]    Patient: Arely DC MD Bonita   YOB: 1955   Date of Visit: 5/30/2025     Dear Mohini Baeza MD:       Thank you for referring Arely Mesa to me for evaluation. Below are the relevant portions of my assessment and plan of care.    If you have questions, please do not hesitate to call me. I look forward to following Arely along with you.         Sincerely,        Sam Negrete MD        CC: MD Sofi Michel MD Alyssa M Posteraro, RN  Mesa MD Caden Vincent Lee G, MD  05/30/25 0954  Sign when Signing Visit  CHIEF COMPLAINT: Tachycardia and lightheadedness    Problem List:  Oncology/Hematology History Overview Note   1.  Stage IV well-differentiated pancreatic neuroendocrine tumor clinical [id number] presenting as inpatient with abdominal pain.  CT angiogram chest showed scattered small lung nodules. CT Abd Pelvis and MRI pancreas protocol showed 2.6 cm mass within the body of the pancreas with associated coarse calcification. There is upstream pancreatic ductal dilation with atrophy of the pancreatic tail. Appearance is concerning for primary pancreatic malignancy. Numerous small hypoenhancing masses within the liver and spleen, concerning for metastatic lesions.  Upper abdominal/retroperitoneal lymphadenopathy, including a 3.3 cm mass adjacent to the left adrenal gland. Liver biopsy non diagnostic.  Ca19-9 and cmp and cbc unremarkable.2/20/2025 FDG PET positive for pancreas primary with axial and proximal appendicular skeletal, splenic, hepatic, and beata metastases.  2/21/2025 para aortic node biopsy positive well-differentiated grade 1 neuroendocrine carcinoma Ki-67 2-3%     -2/14/25 [facility] outpatient followup: Here to go over the above inpatient workup. Need definitive answer from tissue. Reviewed with Dr. Whittington re imaging sites to go after. Thinks the L3 level  aortocaval node would be high yield. If can't get that then get spleen bx. Thought the yield and safety from that greater than from EUS bx of pancreas/ node. Could be Pratik ag negative pancreas cancer (hence neg Ca19-9) or PNET or.....I think this may be lymphoma. Will get bx as per Dr. Paul, PET, Echo, LDH, uric acid and see her back.   -2/14/2025 uric acid and LDH normal   -2/19/2025 ejection fraction 63.7% GLS -17%  -2/20/2025 PET shows hypermetabolic pancreas primary with lesions throughout the axial and proximal appendicular skeleton, numerous splenic and hepatic lesions, and beata metastases.  -2/21/2025 CT needle biopsy para-aortic node and bone marrow biopsy.Bone marrow pathology unremarkable.  Lymph node biopsy well-differentiated grade 1 neuroendocrine carcinoma Ki-67 2-3%.    -2/28/2025 [facility] medical oncology follow-up: The patient has pancreatic neuroendocrine tumor grade 1.  However, because I was sniffing out potential lymphoma, I had previously ordered FDG PET and it was unusually abnormal as outlined above for a low-grade PNET.  That has me concerned that there is discordance between the behavior of the tumor and the Ki-67/grade.  I am going to check chromogranin A and pancreatic polypeptide though there is not universal agreement on the usefulness of those for diagnosing managing her following this.  I am going to get a copper dotatate PET and plan to start Sandostatin LAR in a couple of weeks.  However, if the copper dotatate PET does not like this up like would be expected for well-differentiated neuroendocrine tumor of the pancreas, then I would wonder if we should not lead with Sandostatin but consider Capecitabine Temodar or intermediate to that a tyrosine kinase inhibitor or mTOR inhibitor.  I will communicate with Dr. Terell Floyd who is an expert in this area.  I relish his thoughts on the divination as to the true aggressiveness of this and how to proceed.  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 Floyd would like.  There is no ego involved here.       Addendum: I spoke with Dr. Floyd.  As she is not having pain or any syndromic symptoms, if the copper dotatate PET is positive then 1 could argue for watchful waiting.  Sandostatin analogs could perhaps hold that in check but could also introduce symptoms.  Could consider giving Capecitabine Temodar for 2 or 3 months and repeating the FDG PET and if she gets a rapid response pressed that to maximum cytoreduction and then go to either watchful waiting or maintenance Sandostatin analog.  Ultimately our decision will revolve around the results of her copper dotatate PET.  I will also add her neuron-specific enolase as if that is high that would lean us towards a more aggressive form of PNET.    - 3/24/2025 telemedicine [facility] follow-up: I reviewed CAT scans today with Dr. Greene CT chest abdomen pelvis showing her known liver and splenic and retroperitoneal adenopathy involvement.  Her FDG PET and copper dotatate PET, the latter performed by Dr. Floyd, show an array of abnormalities ranging from low-grade to intermediate grade to high-grade.  2/28/2025 neuron-specific enolase is elevated at 28 with a chromogranin A of 272 and a normal pancreatic polypeptide.   reviewed the lymph node that showed well-differentiated neuroendocrine carcinoma.  Per discussion with Dr. Floyd, given the fact that the FDG PET in the neuron-specific enolase suggests a significant high-grade component not just the well-differentiated low-grade component, he recommends 2 or 3 months of Capecitabine Temodar and then repeating the FDG PET and then pressed to maximum cytoreduction if getting a good response and then either go to watchful waiting or maintenance Sandostatin analog.  We will get her educated and start  Capecitabine Temodar.  Side effects discussed in detail with the patient and she will have her cancer treatment preparation visit later this week.  She has a trip for a meeting to [place] that she wants to go to that would put her towards her corazon if we started on 31 March so we will move that out a week and she will get labs that day and then see my nurse practitioner in my absence on May 5 for cycle 2.  She will keep us appraised of her tolerance.  After 2 to 3 months we would repeat imaging of FDG PET and then treat to maximum cytoreduction.  According to the patient, Dr. Floyd plans on seeing the patient back in June and he left her with the impression that she may be on Capecitabine Temodar for the better part of a year.  I will follow his lead on that as well as when to switch or fold in lanreotide versus continued watchful waiting at that junction.    -5/7/2025 [facility] medical oncology APRN visit: Patient completed first cycle of capecitabine Temodar.  She tolerated it fairly well but did have significant nausea with the Temodar.  She managed with Zofran and Phenergan as needed.  She is asking for Phenergan suppositories which I have sent in.  She had issues with constipation that is now controlled with fiber and prune juice.  She had a flareup of her hemorrhoids that are internal and is requesting Anusol HC suppository which I have sent in for her.  I reviewed her labs from 5/2/2025 that are adequate for treatment.  White count, hemoglobin, platelets and ANC are normal.  Liver enzymes slightly elevated with AST 53 and ALT 59.  Sodium 135, otherwise CMP unremarkable.  She is having sciatica pain on her left side down to her ankle that has been ongoing since her PET scan.  She has done physical therapy without much relief.  She had an MRI that showed spinal stenosis with neuroforaminal impingement.  She is planning to get epidural steroid injection at some point.  We will continue treatment without any  changes, she did start her Xeloda back on 5/5/2025.  She will notify us if nausea is unmanageable with the Temodar.  Otherwise we will see her back on 5/30/2025 prior to starting cycle 3 on 6/2/2025 at [place].    - 5/19/2025 through 5/21/2025 [facility] inpatient hospitalization for tachycardia with normal echocardiogram and feeling lightheaded.  EKG normal.  Felt to be due to volume depletion anemia and nausea all of which were improved after Zofran dexamethasone and IV fluids.  Hemoglobin down from 10 on admission to 8.7 with hydration.    - 5/27/2025 hemoglobin 9.9 with normal white count platelet count.  ALT 73 AST 43 alkaline phosphatase 140 C-reactive protein 0.56.  Sedimentation rate 5    - 5/30/2025 [facility] medical oncology follow-up: Hemoglobin 9.3 .9 otherwise unremarkable CBC.  CMP today pending.  Reviewed her hospitalization and cardiology evaluation.  Most of her tachycardia just likely related to anemia and decreased intravascular volumes.  Her AST and ALT are slightly elevated but normal bilirubin and the Temodar and Xeloda do not need dose adjustments unless bilirubin rises which it has not.  Today is the start of cycle 3 of capecitabine Temodar.  She still having periodic tachycardia.  Cardiology believes this is autonomic dysfunction brought on by the steroid injection in her spine.  She is going to [place] next week.  She will look towards getting a beta-blocker just in case and I am holding her Temodar and Xeloda which should start again this coming Monday and we will hold it a week but I do not think that her tachycardia is related to her treatment.  Whether the well-differentiated pancreatic neuroendocrine tumor is causing some sort of paraneoplastic tachycardia I am unsure but she is not having flushing itching or diarrhea which I would expect to be associated with this more likely than simple tachycardia but one never knows.  Her echocardiogram is  normal.  We will see her back  for her fourth cycle and repeat her FDG PET after that to assess for response and have her see Dr. Floyd back after that to guide as to lanreotide versus watchful waiting.   IV hydration Monday through Friday at her discretion has been ordered as well.     Primary pancreatic neuroendocrine tumor   2025 Initial Diagnosis    Primary pancreatic neuroendocrine tumor     2025 -  Chemotherapy    OP NEUROENDOCRINE Capecitabine / Temozolomide     Malignant poorly differentiated neuroendocrine carcinoma   3/27/2025 Initial Diagnosis    Malignant poorly differentiated neuroendocrine carcinoma     2025 -  Chemotherapy    OP SUPPORTIVE HYDRATION + ANTIEMETICS         HISTORY OF PRESENT ILLNESS:  The patient is a 69 y.o. female, here for follow up on management for well-differentiated pancreatic neuroendocrine tumor  Past Medical History:   Diagnosis Date   • Asthma    • Fibroids    • Genital HSV    • Hypercholesteremia     Off meds ~    • Hypothyroid    • Meniere disease, right along with tinnitus    • Tubular adenoma of colon 2015    f/u scope 2018 normal   • Tubular adenoma of colon 2023     Past Surgical History:   Procedure Laterality Date   •  SECTION  1985   • EPIDURAL  2025   • ORIF PATELLA FRACTURE Left 2018   • RHINOPLASTY  1990   • TONSILLECTOMY  1964   • TRIGGER FINGER RELEASE Left 2013    index finger       Allergies   Allergen Reactions   • Sulfa Antibiotics Itching and Rash   • Hydrocodone-Acetaminophen Nausea And Vomiting       Family History and Social History reviewed and changed as necessary    REVIEW OF SYSTEM:   No new somatic complaints but still having intermittent lightheadedness with tachycardia.  Back pain is better after spinal steroid    PHYSICAL EXAM:  No jaundice icterus or pallor.  No respiratory distress.    Vitals:    25 0917   BP: 134/80   Pulse: 76   Resp: 16   Temp: 97.2 °F  "(36.2 °C)   TempSrc: Temporal   SpO2: 99%   Weight: 59.9 kg (132 lb)   Height: 165.1 cm (65\")     Vitals:    05/30/25 0917   PainSc: 0-No pain          ECOG score: 0           Vitals reviewed.    ECOG: (0) Fully Active - Able to Carry On All Pre-disease Performance Without Restriction    Lab Results   Component Value Date    HGB 9.3 (L) 05/30/2025    HCT 28.2 (L) 05/30/2025    .9 (H) 05/30/2025     05/30/2025    WBC 4.49 05/30/2025    NEUTROABS 2.38 05/30/2025    LYMPHSABS 1.23 05/30/2025    MONOSABS 0.74 05/30/2025    EOSABS 0.08 05/30/2025    BASOSABS 0.02 05/30/2025       Lab Results   Component Value Date    GLUCOSE 102 (H) 05/30/2025    BUN 12.7 05/30/2025    CREATININE 0.73 05/30/2025     (L) 05/30/2025    K 4.7 05/30/2025    CL 98 05/30/2025    CO2 25.0 05/30/2025    CALCIUM 9.0 05/30/2025    PROTEINTOT 6.3 05/30/2025    ALBUMIN 3.9 05/30/2025    BILITOT 0.6 05/30/2025    ALKPHOS 146 (H) 05/30/2025    AST 30 05/30/2025    ALT 48 (H) 05/30/2025             ASSESSMENT & PLAN:  1.  Stage IV well-differentiated pancreatic neuroendocrine tumor clinical [id number] presenting as inpatient with abdominal pain.  CT angiogram chest showed scattered small lung nodules. CT Abd Pelvis and MRI pancreas protocol showed 2.6 cm mass within the body of the pancreas with associated coarse calcification. There is upstream pancreatic ductal dilation with atrophy of the pancreatic tail. Appearance is concerning for primary pancreatic malignancy. Numerous small hypoenhancing masses within the liver and spleen, concerning for metastatic lesions.  Upper abdominal/retroperitoneal lymphadenopathy, including a 3.3 cm mass adjacent to the left adrenal gland. Liver biopsy non diagnostic.  Ca19-9 and cmp and cbc unremarkable.2/20/2025 FDG PET positive for pancreas primary with axial and proximal appendicular skeletal, splenic, hepatic, and beata metastases.  2/21/2025 para aortic node biopsy positive well-differentiated " grade 1 neuroendocrine carcinoma Ki-67 2-3%     -2/14/25 [facility] outpatient followup: Here to go over the above inpatient workup. Need definitive answer from tissue. Reviewed with Dr. Whittington re imaging sites to go after. Thinks the L3 level aortocaval node would be high yield. If can't get that then get spleen bx. Thought the yield and safety from that greater than from EUS bx of pancreas/ node. Could be Pratik ag negative pancreas cancer (hence neg Ca19-9) or PNET or.....I think this may be lymphoma. Will get bx as per Dr. Paul, PET, Echo, LDH, uric acid and see her back.   -2/14/2025 uric acid and LDH normal   -2/19/2025 ejection fraction 63.7% GLS -17%  -2/20/2025 PET shows hypermetabolic pancreas primary with lesions throughout the axial and proximal appendicular skeleton, numerous splenic and hepatic lesions, and beata metastases.  -2/21/2025 CT needle biopsy para-aortic node and bone marrow biopsy.Bone marrow pathology unremarkable.  Lymph node biopsy well-differentiated grade 1 neuroendocrine carcinoma Ki-67 2-3%.    -2/28/2025 [facility] medical oncology follow-up: The patient has pancreatic neuroendocrine tumor grade 1.  However, because I was sniffing out potential lymphoma, I had previously ordered FDG PET and it was unusually abnormal as outlined above for a low-grade PNET.  That has me concerned that there is discordance between the behavior of the tumor and the Ki-67/grade.  I am going to check chromogranin A and pancreatic polypeptide though there is not universal agreement on the usefulness of those for diagnosing managing her following this.  I am going to get a copper dotatate PET and plan to start Sandostatin LAR in a couple of weeks.  However, if the copper dotatate PET does not like this up like would be expected for well-differentiated neuroendocrine tumor of the pancreas, then I would wonder if we should not lead with Sandostatin but consider Capecitabine Temodar or intermediate to that  a tyrosine kinase inhibitor or mTOR inhibitor.  I will communicate with Dr. Terell Floyd who is an expert in this area.  I relish his thoughts on the divination as to the true aggressiveness of this and how to proceed.  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 Floyd would like.  There is no ego involved here.       Addendum: I spoke with Dr. Floyd.  As she is not having pain or any syndromic symptoms, if the copper dotatate PET is positive then 1 could argue for watchful waiting.  Sandostatin analogs could perhaps hold that in check but could also introduce symptoms.  Could consider giving Capecitabine Temodar for 2 or 3 months and repeating the FDG PET and if she gets a rapid response pressed that to maximum cytoreduction and then go to either watchful waiting or maintenance Sandostatin analog.  Ultimately our decision will revolve around the results of her copper dotatate PET.  I will also add her neuron-specific enolase as if that is high that would lean us towards a more aggressive form of PNET.    - 3/24/2025 telemedicine [facility] follow-up: I reviewed CAT scans today with Dr. Greene CT chest abdomen pelvis showing her known liver and splenic and retroperitoneal adenopathy involvement.  Her FDG PET and copper dotatate PET, the latter performed by Dr. Floyd, show an array of abnormalities ranging from low-grade to intermediate grade to high-grade.  2/28/2025 neuron-specific enolase is elevated at 28 with a chromogranin A of 272 and a normal pancreatic polypeptide.   reviewed the lymph node that showed well-differentiated neuroendocrine carcinoma.  Per discussion with Dr. Floyd, given the fact that the FDG PET in the neuron-specific enolase suggests a significant high-grade component not just the well-differentiated low-grade component, he recommends 2 or 3  months of Capecitabine Temodar and then repeating the FDG PET and then pressed to maximum cytoreduction if getting a good response and then either go to watchful waiting or maintenance Sandostatin analog.  We will get her educated and start Capecitabine Temodar.  Side effects discussed in detail with the patient and she will have her cancer treatment preparation visit later this week.  She has a trip for a meeting to [place] that she wants to go to that would put her towards her corazon if we started on 31 March so we will move that out a week and she will get labs that day and then see my nurse practitioner in my absence on May 5 for cycle 2.  She will keep us appraised of her tolerance.  After 2 to 3 months we would repeat imaging of FDG PET and then treat to maximum cytoreduction.  According to the patient, Dr. Floyd plans on seeing the patient back in June and he left her with the impression that she may be on Capecitabine Temodar for the better part of a year.  I will follow his lead on that as well as when to switch or fold in lanreotide versus continued watchful waiting at that junction.    -5/7/2025 [facility] medical oncology APRN visit: Patient completed first cycle of capecitabine Temodar.  She tolerated it fairly well but did have significant nausea with the Temodar.  She managed with Zofran and Phenergan as needed.  She is asking for Phenergan suppositories which I have sent in.  She had issues with constipation that is now controlled with fiber and prune juice.  She had a flareup of her hemorrhoids that are internal and is requesting Anusol HC suppository which I have sent in for her.  I reviewed her labs from 5/2/2025 that are adequate for treatment.  White count, hemoglobin, platelets and ANC are normal.  Liver enzymes slightly elevated with AST 53 and ALT 59.  Sodium 135, otherwise CMP unremarkable.  She is having sciatica pain on her left side down to her ankle that has been ongoing since her PET scan.   She has done physical therapy without much relief.  She had an MRI that showed spinal stenosis with neuroforaminal impingement.  She is planning to get epidural steroid injection at some point.  We will continue treatment without any changes, she did start her Xeloda back on 5/5/2025.  She will notify us if nausea is unmanageable with the Temodar.  Otherwise we will see her back on 5/30/2025 prior to starting cycle 3 on 6/2/2025 at [place].    - 5/19/2025 through 5/21/2025 [facility] inpatient hospitalization for tachycardia with normal echocardiogram and feeling lightheaded.  EKG normal.  Felt to be due to volume depletion anemia and nausea all of which were improved after Zofran dexamethasone and IV fluids.  Hemoglobin down from 10 on admission to 8.7 with hydration.    - 5/27/2025 hemoglobin 9.9 with normal white count platelet count.  ALT 73 AST 43 alkaline phosphatase 140 C-reactive protein 0.56.  Sedimentation rate 5    - 5/30/2025 [facility] medical oncology follow-up: Hemoglobin 9.3 .9 otherwise unremarkable CBC.  CMP today pending.  Reviewed her hospitalization and cardiology evaluation.  Most of her tachycardia just likely related to anemia and decreased intravascular volumes.  Her AST and ALT are slightly elevated but normal bilirubin and the Temodar and Xeloda do not need dose adjustments unless bilirubin rises which it has not.  Today is the start of cycle 3 of capecitabine Temodar.  She still having periodic tachycardia.  Cardiology believes this is autonomic dysfunction brought on by the steroid injection in her spine.  She is going to [place] next week.  She will look towards getting a beta-blocker just in case and I am holding her Temodar and Xeloda which should start again this coming Monday and we will hold it a week but I do not think that her tachycardia is related to her treatment.  Whether the well-differentiated pancreatic neuroendocrine tumor is causing  some sort of paraneoplastic tachycardia I am unsure but she is not having flushing itching or diarrhea which I would expect to be associated with this more likely than simple tachycardia but one never knows.  Her echocardiogram is normal.  We will see her back July 7 for her fourth cycle and repeat her FDG PET after that to assess for response and have her see Dr. Floyd back after that to guide as to lanreotide versus watchful waiting.  IV hydration Monday through Friday at her discretion has been ordered as well.    Total time of care today inclusive of time spent today prior to patient's arrival reviewing interval data and during visit translating patient putting forth plan as outlined after visit instituting this plan took 70 minutes patient care time throughout the day today.  Sam Negrete MD    05/30/2025

## 2025-05-30 NOTE — PROGRESS NOTES
CHIEF COMPLAINT: Tachycardia and lightheadedness    Problem List:  Oncology/Hematology History Overview Note   1.  Stage IV well-differentiated pancreatic neuroendocrine tumor clinical [id number] presenting as inpatient with abdominal pain.  CT angiogram chest showed scattered small lung nodules. CT Abd Pelvis and MRI pancreas protocol showed 2.6 cm mass within the body of the pancreas with associated coarse calcification. There is upstream pancreatic ductal dilation with atrophy of the pancreatic tail. Appearance is concerning for primary pancreatic malignancy. Numerous small hypoenhancing masses within the liver and spleen, concerning for metastatic lesions.  Upper abdominal/retroperitoneal lymphadenopathy, including a 3.3 cm mass adjacent to the left adrenal gland. Liver biopsy non diagnostic.  Ca19-9 and cmp and cbc unremarkable.2/20/2025 FDG PET positive for pancreas primary with axial and proximal appendicular skeletal, splenic, hepatic, and beata metastases.  2/21/2025 para aortic node biopsy positive well-differentiated grade 1 neuroendocrine carcinoma Ki-67 2-3%     -2/14/25 [facility] outpatient followup: Here to go over the above inpatient workup. Need definitive answer from tissue. Reviewed with Dr. Whittington re imaging sites to go after. Thinks the L3 level aortocaval node would be high yield. If can't get that then get spleen bx. Thought the yield and safety from that greater than from EUS bx of pancreas/ node. Could be Pratik ag negative pancreas cancer (hence neg Ca19-9) or PNET or.....I think this may be lymphoma. Will get bx as per Dr. Paul, PET, Echo, LDH, uric acid and see her back.   -2/14/2025 uric acid and LDH normal   -2/19/2025 ejection fraction 63.7% GLS -17%  -2/20/2025 PET shows hypermetabolic pancreas primary with lesions throughout the axial and proximal appendicular skeleton, numerous splenic and hepatic lesions, and beata metastases.  -2/21/2025 CT needle biopsy para-aortic node and  bone marrow biopsy.Bone marrow pathology unremarkable.  Lymph node biopsy well-differentiated grade 1 neuroendocrine carcinoma Ki-67 2-3%.    -2/28/2025 [facility] oncology follow-up: The patient has pancreatic neuroendocrine tumor grade 1.  However, because I was sniffing out potential lymphoma, I had previously ordered FDG PET and it was unusually abnormal as outlined above for a low-grade PNET.  That has me concerned that there is discordance between the behavior of the tumor and the Ki-67/grade.  I am going to check chromogranin A and pancreatic polypeptide though there is not universal agreement on the usefulness of those for diagnosing managing her following this.  I am going to get a copper dotatate PET and plan to start Sandostatin LAR in a couple of weeks.  However, if the copper dotatate PET does not like this up like would be expected for well-differentiated neuroendocrine tumor of the pancreas, then I would wonder if we should not lead with Sandostatin but consider Capecitabine Temodar or intermediate to that a tyrosine kinase inhibitor or mTOR inhibitor.  I will communicate with Dr. Terell Floyd who is an expert in this area.  I relish his thoughts on the divination as to the true aggressiveness of this and how to proceed.  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 Floyd would like.  There is no ego involved here.       Addendum: I spoke with Dr. Floyd.  As she is not having pain or any syndromic symptoms, if the copper dotatate PET is positive then 1 could argue for watchful waiting.  Sandostatin analogs could perhaps hold that in check but could also introduce symptoms.  Could consider giving Capecitabine Temodar for 2 or 3 months and repeating the FDG PET and if she gets a rapid response pressed that to maximum cytoreduction and then go to  either watchful waiting or maintenance Sandostatin analog.  Ultimately our decision will revolve around the results of her copper dotatate PET.  I will also add her neuron-specific enolase as if that is high that would lean us towards a more aggressive form of PNET.    - 3/24/2025 [facility] follow-up: I reviewed CAT scans today with Dr. Greene CT chest abdomen pelvis showing her known liver and splenic and retroperitoneal adenopathy involvement.  Her FDG PET and copper dotatate PET, the latter performed by Dr. Floyd, show an array of abnormalities ranging from low-grade to intermediate grade to high-grade.  2/28/2025 neuron-specific enolase is elevated at 28 with a chromogranin A of 272 and a normal pancreatic polypeptide.  UK reviewed the lymph node that showed well-differentiated neuroendocrine carcinoma.  Per discussion with Dr. Floyd, given the fact that the FDG PET in the neuron-specific enolase suggests a significant high-grade component not just the well-differentiated low-grade component, he recommends 2 or 3 months of Capecitabine Temodar and then repeating the FDG PET and then pressed to maximum cytoreduction if getting a good response and then either go to watchful waiting or maintenance Sandostatin analog.  We will get her educated and start Capecitabine Temodar.  Side effects discussed in detail with the patient and she will have her cancer treatment preparation visit later this week.  She has a trip for a meeting to [place] that she wants to go to that would put her towards her corazon if we started on 31 March so we will move that out a week and she will get labs that day and then see my nurse practitioner in my absence on May 5 for cycle 2.  She will keep us appraised of her tolerance.  After 2 to 3 months we would repeat imaging of FDG PET and then treat to maximum cytoreduction.  According to the patient, Dr. Floyd plans on seeing the patient back in June and he left her with the  impression that she may be on Capecitabine Temodar for the better part of a year.  I will follow his lead on that as well as when to switch or fold in lanreotide versus continued watchful waiting at that junction.    -5/7/2025 [facility] medical oncology APRN visit: Patient completed first cycle of capecitabine Temodar.  She tolerated it fairly well but did have significant nausea with the Temodar.  She managed with Zofran and Phenergan as needed.  She is asking for Phenergan suppositories which I have sent in.  She had issues with constipation that is now controlled with fiber and prune juice.  She had a flareup of her hemorrhoids that are internal and is requesting Anusol HC suppository which I have sent in for her.  I reviewed her labs from 5/2/2025 that are adequate for treatment.  White count, hemoglobin, platelets and ANC are normal.  Liver enzymes slightly elevated with AST 53 and ALT 59.  Sodium 135, otherwise CMP unremarkable.  She is having sciatica pain on her left side down to her ankle that has been ongoing since her PET scan.  She has done physical therapy without much relief.  She had an MRI that showed spinal stenosis with neuroforaminal impingement.  She is planning to get epidural steroid injection at some point.  We will continue treatment without any changes, she did start her Xeloda back on 5/5/2025.  She will notify us if nausea is unmanageable with the Temodar.  Otherwise we will see her back on 5/30/2025 prior to starting cycle 3 on 6/2/2025 at which time [place] will be out of the country.    - 5/19/2025 through 5/21/2025 [facility] inpatient hospitalization for tachycardia with normal echocardiogram and feeling lightheaded.  EKG normal.  Felt to be due to volume depletion anemia and nausea all of which were improved after Zofran dexamethasone and IV fluids.  Hemoglobin down from 10 on admission to 8.7 with hydration.    - 5/27/2025 hemoglobin 9.9 with normal white count platelet count.  ALT 73 AST 43  alkaline phosphatase 140 C-reactive protein 0.56.  Sedimentation rate 5    - 5/30/2025 [facility] medical oncology follow-up: Hemoglobin 9.3 .9 otherwise unremarkable CBC.  CMP today pending.  Reviewed her hospitalization and cardiology evaluation.  Most of her tachycardia just likely related to anemia and decreased intravascular volumes.  Her AST and ALT are slightly elevated but normal bilirubin and the Temodar and Xeloda do not need dose adjustments unless bilirubin rises which it has not.  Today is the start of cycle 3 of capecitabine Temodar.  She still having periodic tachycardia.  Cardiology believes this is autonomic dysfunction brought on by the steroid injection in her spine.  She is going to [place] next week.  She will look towards getting a beta-blocker just in case and I am holding her Temodar and Xeloda which should start again this coming Monday and we will hold it a week but I do not think that her tachycardia is related to her treatment.  Whether the well-differentiated pancreatic neuroendocrine tumor is causing some sort of paraneoplastic tachycardia I am unsure but she is not having flushing itching or diarrhea which I would expect to be associated with this more likely than simple tachycardia but one never knows.  Her echocardiogram is normal.  We will see her back July 7 for her fourth cycle and repeat her FDG PET after that to assess for response and have her see Dr. Floyd back after that to guide as to lanreotide versus watchful waiting.   IV hydration Monday through Friday at her discretion has been ordered as well.     Primary pancreatic neuroendocrine tumor   2/28/2025 Initial Diagnosis    Primary pancreatic neuroendocrine tumor     4/7/2025 -  Chemotherapy    OP NEUROENDOCRINE Capecitabine / Temozolomide     Malignant poorly differentiated neuroendocrine carcinoma   3/27/2025 Initial Diagnosis    Malignant poorly differentiated neuroendocrine carcinoma     5/19/2025 -  Chemotherapy  "   OP SUPPORTIVE HYDRATION + ANTIEMETICS         HISTORY OF PRESENT ILLNESS:  The patient is a 69 y.o. female, here for follow up on management for well-differentiated pancreatic neuroendocrine tumor  Past Medical History:   Diagnosis Date    Asthma 1988    Fibroids     Genital HSV     Hypercholesteremia     Off meds ~     Hypothyroid 2003    Meniere disease, right along with tinnitus     Tubular adenoma of colon 2015    f/u scope 2018 normal    Tubular adenoma of colon 2023     Past Surgical History:   Procedure Laterality Date     SECTION  1985    EPIDURAL  2025    ORIF PATELLA FRACTURE Left 2018    RHINOPLASTY  1990    TONSILLECTOMY  1964    TRIGGER FINGER RELEASE Left 2013    index finger       Allergies   Allergen Reactions    Sulfa Antibiotics Itching and Rash    Hydrocodone-Acetaminophen Nausea And Vomiting       Family History and Social History reviewed and changed as necessary    REVIEW OF SYSTEM:   No new somatic complaints but still having intermittent lightheadedness with tachycardia.  Back pain is better after spinal steroid    PHYSICAL EXAM:  No jaundice icterus or pallor.  No respiratory distress.    Vitals:    25   BP: 134/80   Pulse: 76   Resp: 16   Temp: 97.2 °F (36.2 °C)   TempSrc: Temporal   SpO2: 99%   Weight: 59.9 kg (132 lb)   Height: 165.1 cm (65\")     Vitals:    25   PainSc: 0-No pain          ECOG score: 0           Vitals reviewed.    ECOG: (0) Fully Active - Able to Carry On All Pre-disease Performance Without Restriction    Lab Results   Component Value Date    HGB 9.3 (L) 2025    HCT 28.2 (L) 2025    .9 (H) 2025     2025    WBC 4.49 2025    NEUTROABS 2.38 2025    LYMPHSABS 1.23 2025    MONOSABS 0.74 2025    EOSABS 0.08 2025    BASOSABS 0.02 2025       Lab Results   Component Value Date    GLUCOSE 102 (H) 2025    BUN 12.7 2025 "    CREATININE 0.73 05/30/2025     (L) 05/30/2025    K 4.7 05/30/2025    CL 98 05/30/2025    CO2 25.0 05/30/2025    CALCIUM 9.0 05/30/2025    PROTEINTOT 6.3 05/30/2025    ALBUMIN 3.9 05/30/2025    BILITOT 0.6 05/30/2025    ALKPHOS 146 (H) 05/30/2025    AST 30 05/30/2025    ALT 48 (H) 05/30/2025             ASSESSMENT & PLAN:  1.  Stage IV well-differentiated pancreatic neuroendocrine tumor clinical [id number] presenting as inpatient with abdominal pain.  CT angiogram chest showed scattered small lung nodules. CT Abd Pelvis and MRI pancreas protocol showed 2.6 cm mass within the body of the pancreas with associated coarse calcification. There is upstream pancreatic ductal dilation with atrophy of the pancreatic tail. Appearance is concerning for primary pancreatic malignancy. Numerous small hypoenhancing masses within the liver and spleen, concerning for metastatic lesions.  Upper abdominal/retroperitoneal lymphadenopathy, including a 3.3 cm mass adjacent to the left adrenal gland. Liver biopsy non diagnostic.  Ca19-9 and cmp and cbc unremarkable.2/20/2025 FDG PET positive for pancreas primary with axial and proximal appendicular skeletal, splenic, hepatic, and beata metastases.  2/21/2025 para aortic node biopsy positive well-differentiated grade 1 neuroendocrine carcinoma Ki-67 2-3%     -2/14/25 [facility] outpatient followup: Here to go over the above inpatient workup. Need definitive answer from tissue. Reviewed with Dr. Whittington re imaging sites to go after. Thinks the L3 level aortocaval node would be high yield. If can't get that then get spleen bx. Thought the yield and safety from that greater than from EUS bx of pancreas/ node. Could be Pratik ag negative pancreas cancer (hence neg Ca19-9) or PNET or.....I think this may be lymphoma. Will get bx as per Dr. Paul, PET, Echo, LDH, uric acid and see her back.   -2/14/2025 uric acid and LDH normal   -2/19/2025 ejection fraction 63.7% GLS -17%  -2/20/2025  PET shows hypermetabolic pancreas primary with lesions throughout the axial and proximal appendicular skeleton, numerous splenic and hepatic lesions, and beata metastases.  -2/21/2025 CT needle biopsy para-aortic node and bone marrow biopsy.Bone marrow pathology unremarkable.  Lymph node biopsy well-differentiated grade 1 neuroendocrine carcinoma Ki-67 2-3%.    -2/28/2025 [facility] oncology follow-up: The patient has pancreatic neuroendocrine tumor grade 1.  However, because I was sniffing out potential lymphoma, I had previously ordered FDG PET and it was unusually abnormal as outlined above for a low-grade PNET.  That has me concerned that there is discordance between the behavior of the tumor and the Ki-67/grade.  I am going to check chromogranin A and pancreatic polypeptide though there is not universal agreement on the usefulness of those for diagnosing managing her following this.  I am going to get a copper dotatate PET and plan to start Sandostatin LAR in a couple of weeks.  However, if the copper dotatate PET does not like this up like would be expected for well-differentiated neuroendocrine tumor of the pancreas, then I would wonder if we should not lead with Sandostatin but consider Capecitabine Temodar or intermediate to that a tyrosine kinase inhibitor or mTOR inhibitor.  I will communicate with Dr. Terell Floyd who is an expert in this area.  I relish his thoughts on the divination as to the true aggressiveness of this and how to proceed.  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 Floyd would like.  There is no ego involved here.       Addendum: I spoke with Dr. Floyd.  As she is not having pain or any syndromic symptoms, if the copper dotatate PET is positive then 1 could argue for watchful waiting.  Sandostatin analogs could perhaps hold that  in check but could also introduce symptoms.  Could consider giving Capecitabine Temodar for 2 or 3 months and repeating the FDG PET and if she gets a rapid response pressed that to maximum cytoreduction and then go to either watchful waiting or maintenance Sandostatin analog.  Ultimately our decision will revolve around the results of her copper dotatate PET.  I will also add her neuron-specific enolase as if that is high that would lean us towards a more aggressive form of PNET.    - [facility]: I reviewed CAT scans today with Dr. Greene CT chest abdomen pelvis showing her known liver and splenic and retroperitoneal adenopathy involvement.  Her FDG PET and copper dotatate PET, the latter performed by Dr. Floyd, show an array of abnormalities ranging from low-grade to intermediate grade to high-grade.  2/28/2025 neuron-specific enolase is elevated at 28 with a chromogranin A of 272 and a normal pancreatic polypeptide.  UK reviewed the lymph node that showed well-differentiated neuroendocrine carcinoma.  Per discussion with Dr. Floyd, given the fact that the FDG PET in the neuron-specific enolase suggests a significant high-grade component not just the well-differentiated low-grade component, he recommends 2 or 3 months of Capecitabine Temodar and then repeating the FDG PET and then pressed to maximum cytoreduction if getting a good response and then either go to watchful waiting or maintenance Sandostatin analog.  We will get her educated and start Capecitabine Temodar.  Side effects discussed in detail with the patient and she will have her cancer treatment preparation visit later this week.  She has a trip for a meeting to [place] that she wants to go to that would put her towards her corazon if we started on 31 March so we will move that out a week and she will get labs that day and then see my nurse practitioner in my absence on May 5 for cycle 2.  She will keep us appraised of her  tolerance.  After 2 to 3 months we would repeat imaging of FDG PET and then treat to maximum cytoreduction.  According to the patient, Dr. Floyd plans on seeing the patient back in June and he left her with the impression that she may be on Capecitabine Temodar for the better part of a year.  I will follow his lead on that as well as when to switch or fold in lanreotide versus continued watchful waiting at that junction.    -5/7/2025 [facility] medical oncology APRN visit: Patient completed first cycle of capecitabine Temodar.  She tolerated it fairly well but did have significant nausea with the Temodar.  She managed with Zofran and Phenergan as needed.  She is asking for Phenergan suppositories which I have sent in.  She had issues with constipation that is now controlled with fiber and prune juice.  She had a flareup of her hemorrhoids that are internal and is requesting Anusol HC suppository which I have sent in for her.  I reviewed her labs from 5/2/2025 that are adequate for treatment.  White count, hemoglobin, platelets and ANC are normal.  Liver enzymes slightly elevated with AST 53 and ALT 59.  Sodium 135, otherwise CMP unremarkable.  She is having sciatica pain on her left side down to her ankle that has been ongoing since her PET scan.  She has done physical therapy without much relief.  She had an MRI that showed spinal stenosis with neuroforaminal impingement.  She is planning to get epidural steroid injection at some point.  We will continue treatment without any changes, she did start her Xeloda back on 5/5/2025.  She will notify us if nausea is unmanageable with the Temodar.  Otherwise we will see her back on 5/30/2025 prior to starting cycle 3 on 6/2/2025 at which time [place] will [place] out of the country.    - 5/19/2025 through 5/21/2025 [facility] inpatient hospitalization for tachycardia with normal echocardiogram and feeling lightheaded.  EKG normal.  Felt to be due to volume depletion anemia and nausea  all of which were improved after Zofran dexamethasone and IV fluids.  Hemoglobin down from 10 on admission to 8.7 with hydration.    - 5/27/2025 hemoglobin 9.9 with normal white count platelet count.  ALT 73 AST 43 alkaline phosphatase 140 C-reactive protein 0.56.  Sedimentation rate 5    - 5/30/2025 [facility] oncology follow-up: Hemoglobin 9.3 .9 otherwise unremarkable CBC.  CMP today pending.  Reviewed her hospitalization and cardiology evaluation.  Most of her tachycardia just likely related to anemia and decreased intravascular volumes.  Her AST and ALT are slightly elevated but normal bilirubin and the Temodar and Xeloda do not need dose adjustments unless bilirubin rises which it has not.  Today is the start of cycle 3 of capecitabine Temodar.  She still having periodic tachycardia.  Cardiology believes this is autonomic dysfunction brought on by the steroid injection in her spine.  She is going to [place] next week.  She will look towards getting a beta-blocker just in case and I am holding her Temodar and Xeloda which should start again this coming Monday and we will hold it a week but I do not think that her tachycardia is related to her treatment.  Whether the well-differentiated pancreatic neuroendocrine tumor is causing some sort of paraneoplastic tachycardia I am unsure but she is not having flushing itching or diarrhea which I would expect to be associated with this more likely than simple tachycardia but one never knows.  Her echocardiogram is normal.  We will see her back July 7 for her fourth cycle and repeat her FDG PET after that to assess for response and have her see Dr. Floyd back after that to guide as to lanreotide versus watchful waiting.  IV hydration Monday through Friday at her discretion has been ordered as well.    Total time of care today inclusive of time spent today prior to patient's arrival reviewing interval data and during visit translating patient putting forth  plan as outlined after visit instituting this plan took 70 minutes patient care time throughout the day today.  Sam Negrete MD    05/30/2025

## 2025-05-30 NOTE — TELEPHONE ENCOUNTER
Patient called stating that she is leaving the country tomorrow and will be gone for one week. She saw hematology/oncology today, who cleared her for her trip. She was advised to contact our office to see if we could offer her a temporary supply metoprolol for her to take as needed during her trip. She is currently wearing the holter monitor, and will remove it at 3:30pm today. She is still having episodes when her HR is in the 140s. She reports at times it will be higher with excursion. She is staying well hydrated and no caffeine. Reports BP WNL. States her watch reports HR from . Please advise.     I did discuss vagal maneuvers with patient that she could try if she were to experience elevated HR.

## 2025-06-03 ENCOUNTER — PRIOR AUTHORIZATION (OUTPATIENT)
Dept: OBSTETRICS AND GYNECOLOGY | Facility: CLINIC | Age: 70
End: 2025-06-03
Payer: MEDICARE

## 2025-06-03 NOTE — TELEPHONE ENCOUNTER
Per CoverMyMeds Eligibility could not be verified for this patient due to patient not being found.

## 2025-06-10 ENCOUNTER — TELEPHONE (OUTPATIENT)
Dept: CARDIOLOGY | Facility: CLINIC | Age: 70
End: 2025-06-10
Payer: MEDICARE

## 2025-06-10 ENCOUNTER — READMISSION MANAGEMENT (OUTPATIENT)
Dept: CALL CENTER | Facility: HOSPITAL | Age: 70
End: 2025-06-10
Payer: MEDICARE

## 2025-06-10 NOTE — TELEPHONE ENCOUNTER
Thomas Zhu MD Jackson, Kristina, DEEJAY  Holter benign high normal average heart rate    Pt called  informed of the above results, verbalized understanding.

## 2025-06-10 NOTE — OUTREACH NOTE
Medical Week 3 Survey      Flowsheet Row Responses   [facility] patient discharged from? [place]   Does the patient have one of the following disease processes/diagnoses(primary or secondary)? Other   Week 3 attempt successful? Yes   Call start time 0947   Call end time 0952   Discharge diagnosis Tachycardia   Person spoke with today (if not patient) and relationship Patient   Meds reviewed with patient/caregiver? Yes  [resumed usual home meds]   Does the patient have all medications ordered at discharge? N/A  [No new meds ordered at discharge.]   Is the patient taking all medications as directed (includes completed medication regime)? Yes   Comments regarding appointments Cardiology follow up with Dr Zhu 7/3  315pm   Does the patient have a primary care provider?  Yes   Comments regarding PCP Patient reports that she has seen her primary care for follow up, but is also interested in changing primary care to a [facility] provider. [facility] # given for patient to contact for new [facility] PCP   Has the patient kept scheduled appointments due by today? Yes   Has home health visited the patient within 72 hours of discharge? N/A   Psychosocial issues? No   Did the patient receive a copy of their discharge instructions? Yes   Nursing interventions Reviewed instructions with patient   What is the patient's perception of their health status since discharge? Improving   Is the patient/caregiver able to teach back signs and symptoms related to disease process for when to call PCP? Yes   Is the patient/caregiver able to teach back the hierarchy of who to call/visit for symptoms/problems? PCP, Specialist, Home health nurse, Urgent Care, ED, 911 Yes   If the patient is a current smoker, are they able to teach back resources for cessation? Not a smoker   Week 3 Call Completed? Yes   Graduated Yes   Is the patient interested in additional calls from an ambulatory ? No   Would this patient benefit from a Referral to  Amb Social Work? No   Graduated/Revoked comments Patient reports that she is showing improvement. She states her heart rate is now high normal and she will be following up with cardiology. Denies any new questions or needs today. Goals met. No further calls.   Call end time [id number]            KAREL ZAMUDIO - Registered Nurse

## 2025-06-13 RX ORDER — DAPSONE 100 MG/1
100 TABLET ORAL DAILY
Qty: 30 TABLET | Refills: 3 | Status: SHIPPED | OUTPATIENT
Start: 2025-06-13

## 2025-06-30 ENCOUNTER — HOSPITAL ENCOUNTER (OUTPATIENT)
Dept: CT IMAGING | Facility: HOSPITAL | Age: 70
Discharge: HOME | End: 2025-06-30
Admitting: INTERNAL MEDICINE
Payer: MEDICARE

## 2025-06-30 DIAGNOSIS — D3A.8 PRIMARY PANCREATIC NEUROENDOCRINE TUMOR: ICD-10-CM

## 2025-06-30 PROCEDURE — 74177 CT ABD & PELVIS W/CONTRAST: CPT

## 2025-06-30 PROCEDURE — 71260 CT THORAX DX C+: CPT

## 2025-06-30 PROCEDURE — 25510000001 IOPAMIDOL 61 % SOLUTION: Performed by: INTERNAL MEDICINE

## 2025-06-30 RX ORDER — IOPAMIDOL 612 MG/ML
100 INJECTION, SOLUTION INTRAVASCULAR
Status: COMPLETED | OUTPATIENT
Start: 2025-06-30 | End: 2025-06-30

## 2025-06-30 RX ADMIN — IOPAMIDOL 85 ML: 612 INJECTION, SOLUTION INTRAVENOUS at 13:31

## 2025-07-03 ENCOUNTER — OFFICE VISIT (OUTPATIENT)
Dept: CARDIOLOGY | Facility: CLINIC | Age: 70
End: 2025-07-03
Payer: MEDICARE

## 2025-07-03 VITALS
OXYGEN SATURATION: 96 % | BODY MASS INDEX: 22.16 KG/M2 | HEIGHT: 65 IN | WEIGHT: 133 LBS | DIASTOLIC BLOOD PRESSURE: 76 MMHG | HEART RATE: 75 BPM | SYSTOLIC BLOOD PRESSURE: 118 MMHG

## 2025-07-03 DIAGNOSIS — R00.2 PALPITATIONS: Primary | ICD-10-CM

## 2025-07-03 PROCEDURE — 99212 OFFICE O/P EST SF 10 MIN: CPT | Performed by: INTERNAL MEDICINE

## 2025-07-03 NOTE — PROGRESS NOTES
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 Negrete/Dr. Floyd ()  Metastatic lesions in liver/spleen/spine  Chemotherapy, Xeloda/Temodar x 2 cycles (14-day)  Asthma  HLD  Hypothyroid  Spinal stenosis  S/p steroid injection 25  Chronic hyponatremia  Surgical history:    section  ORIF patella fracture, left  Rhinoplasty  Tonsillectomy  Left trigger finger release        CC:   Chief Complaint   Patient presents with    Rapid Heart Rate       Allergies  Allergies   Allergen Reactions    Sulfa Antibiotics Itching and Rash    Hydrocodone-Acetaminophen Nausea And Vomiting       Current Medications    Current Outpatient Medications:     bisoprolol (ZEBeta) 5 MG tablet, Take 1 tablet by mouth Daily As Needed (palps)., Disp: 30 tablet, Rfl: 1    Calcium Citrate-Vitamin D3 (CITRACAL) 315-6.25 MG-MCG tablet tablet, Take 2 tablets by mouth Daily., Disp: , Rfl:     capecitabine (XELODA) 150 MG chemo tablet, Take 2 tablets by mouth with 1 other capecitabine prescription for 1,300 mg total 2 (Two) Times a Day. On Days 1-14 then 14 days off of each 28-day cycle, Disp: 56 tablet, Rfl: 5    capecitabine (XELODA) 500 MG chemo tablet, Take 2 tablets by mouth with 1 other capecitabine prescription for 1,300 mg total 2 (Two) Times a Day. On Days 1-14 then 14 days off of each 28-day cycle, Disp: 56 tablet, Rfl: 5    cetirizine (zyrTEC) 10 MG tablet, Take 1 tablet by mouth Daily., Disp: , Rfl:     cyclobenzaprine (FLEXERIL) 10 MG tablet, Take 1 tablet by mouth 3 (Three) Times a Day As Needed for Muscle  Spasms., Disp: 90 tablet, Rfl: 0    cycloSPORINE (RESTASIS) 0.05 % ophthalmic emulsion, Apply 2 drops to eye(s) as directed by provider Every 12 (Twelve) Hours., Disp: , Rfl:     dapsone 100 MG tablet, Take 1 tablet by mouth Daily., Disp: 30 tablet, Rfl: 3    Duavee 0.45-20 MG tablet, Take 1 each by mouth Daily., Disp: 90 tablet, Rfl: 4    fluticasone (FLONASE) 50 MCG/ACT nasal spray, Administer 2 sprays into the nostril(s) as directed by provider Daily., Disp: , Rfl:     glucosamine-chondroitin 500-400 MG per tablet, Take 1 tablet by mouth Daily., Disp: , Rfl:     hydrocortisone (ANUSOL-HC) 25 MG suppository, Insert 1 suppository into the rectum 2 (Two) Times a Day As Needed for Hemorrhoids., Disp: 12 each, Rfl: 0    levothyroxine (SYNTHROID, LEVOTHROID) 75 MCG tablet, Take 1 tablet by mouth Daily., Disp: , Rfl:     melatonin 5 MG tablet tablet, Take 2 tablets by mouth Every Night. Takes 1-2 tablets, Disp: , Rfl:     Mirabegron ER (Myrbetriq) 25 MG tablet sustained-release 24 hour 24 hr tablet, Take 1 tablet by mouth Daily., Disp: 90 tablet, Rfl: 4    montelukast (SINGULAIR) 10 MG tablet, Take 1 tablet by mouth Every Night., Disp: , Rfl:     ondansetron ODT (ZOFRAN-ODT) 8 MG disintegrating tablet, Place 1 tablet on the tongue Every 8 (Eight) Hours As Needed for Nausea or Vomiting., Disp: 30 tablet, Rfl: 3    oxyCODONE-acetaminophen (Percocet) 5-325 MG per tablet, Take 1 tablet by mouth Every 6 (Six) Hours As Needed for Moderate Pain., Disp: 12 tablet, Rfl: 0    polycarbophil (calcium polycarbophil) 625 MG tablet tablet, Take 1 tablet by mouth Daily. Takes 1-2 times daily, Disp: , Rfl:     promethazine (PHENERGAN) 25 MG suppository, Insert 1 suppository into the rectum Every 8 (Eight) Hours As Needed for Nausea or Vomiting., Disp: 12 suppository, Rfl: 1    promethazine (PHENERGAN) 25 MG tablet, Take 1 tablet by mouth Every 8 (Eight) Hours As Needed for Nausea or Vomiting., Disp: 30 tablet, Rfl: 5     "sennosides-docusate (PERICOLACE) 8.6-50 MG per tablet, Take 2 tablets by mouth 2 (Two) Times a Day., Disp: 60 tablet, Rfl: 0    temozolomide (TEMODAR) 140 MG chemo capsule, Take 1 capsule by mouth with 1 other temozolomide prescription for 320 mg total Daily. On Days 10-14 of a 28-day cycle, Disp: 5 capsule, Rfl: 5    temozolomide (TEMODAR) 180 MG chemo capsule, Take 1 capsule by mouth with 1 other temozolomide prescription for 320 mg total Daily. 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  Completed her  trip and states that she continue to have some palpitations for 2 weeks after hospitalization.  Largely resolved now        OBJECTIVE:  Vitals:    07/03/25 1500   BP: 118/76   Pulse: 75   SpO2: 96%   Weight: 60.3 kg (133 lb)   Height: 165.1 cm (65\")     Body mass index is 22.13 kg/m².    Constitutional:       Appearance: Healthy appearance. Not in distress.   Neck:      Vascular: No JVR. JVD normal.   Pulmonary:      Effort: Pulmonary effort is normal.      Breath sounds: Normal breath sounds. No wheezing. No rhonchi. No rales.   Chest:      Chest wall: Not tender to palpatation.   Cardiovascular:      PMI at left midclavicular line. Normal rate. Regular rhythm. Normal S1. Normal S2.       Murmurs: There is no murmur.      No gallop.  No click. No rub.   Pulses:     Intact distal pulses.   Edema:     Peripheral edema absent.   Abdominal:      General: Bowel sounds are normal.      Palpations: Abdomen is soft.      Tenderness: There is no abdominal tenderness.   Musculoskeletal: Normal range of motion.         General: No tenderness. Skin:     General: Skin is warm and dry.   Neurological:      General: No focal deficit present.      Mental Status: Alert and oriented to person, place and time.         Diagnostic " Data:  Procedures        ASSESSMENT:   Diagnosis Plan   1. Palpitations            PLAN:  Palpitations likely secondary to combination of chemotherapy and injected steroid.  Discussed potentially taking antihistamine if needs future spinal injections and as needed beta-blockade        Thomas Zhu MD, FACC

## 2025-07-07 ENCOUNTER — LAB (OUTPATIENT)
Dept: LAB | Facility: HOSPITAL | Age: 70
End: 2025-07-07
Payer: MEDICARE

## 2025-07-07 ENCOUNTER — OFFICE VISIT (OUTPATIENT)
Dept: ONCOLOGY | Facility: CLINIC | Age: 70
End: 2025-07-07
Payer: MEDICARE

## 2025-07-07 ENCOUNTER — SPECIALTY PHARMACY (OUTPATIENT)
Dept: ONCOLOGY | Facility: HOSPITAL | Age: 70
End: 2025-07-07
Payer: MEDICARE

## 2025-07-07 VITALS
DIASTOLIC BLOOD PRESSURE: 80 MMHG | RESPIRATION RATE: 16 BRPM | OXYGEN SATURATION: 99 % | HEART RATE: 76 BPM | BODY MASS INDEX: 22.16 KG/M2 | HEIGHT: 65 IN | WEIGHT: 133 LBS | SYSTOLIC BLOOD PRESSURE: 144 MMHG | TEMPERATURE: 97.1 F

## 2025-07-07 DIAGNOSIS — D3A.8 PRIMARY PANCREATIC NEUROENDOCRINE TUMOR: ICD-10-CM

## 2025-07-07 DIAGNOSIS — Z79.899 ENCOUNTER FOR LONG-TERM (CURRENT) USE OF HIGH-RISK MEDICATION: ICD-10-CM

## 2025-07-07 DIAGNOSIS — C7A.1 MALIGNANT POORLY DIFFERENTIATED NEUROENDOCRINE CARCINOMA: ICD-10-CM

## 2025-07-07 DIAGNOSIS — D3A.8 PRIMARY PANCREATIC NEUROENDOCRINE TUMOR: Primary | Chronic | ICD-10-CM

## 2025-07-07 DIAGNOSIS — D3A.8 PRIMARY PANCREATIC NEUROENDOCRINE TUMOR: Chronic | ICD-10-CM

## 2025-07-07 DIAGNOSIS — C7A.1 MALIGNANT POORLY DIFFERENTIATED NEUROENDOCRINE CARCINOMA: Primary | ICD-10-CM

## 2025-07-07 LAB
ALBUMIN SERPL-MCNC: 4.1 G/DL (ref 3.5–5.2)
ALBUMIN/GLOB SERPL: 1.6 G/DL
ALP SERPL-CCNC: 126 U/L (ref 39–117)
ALT SERPL W P-5'-P-CCNC: 32 U/L (ref 1–33)
ANION GAP SERPL CALCULATED.3IONS-SCNC: 9.4 MMOL/L (ref 5–15)
AST SERPL-CCNC: 32 U/L (ref 1–32)
BASOPHILS # BLD AUTO: 0.06 10*3/MM3 (ref 0–0.2)
BASOPHILS NFR BLD AUTO: 1.3 % (ref 0–1.5)
BILIRUB SERPL-MCNC: 0.2 MG/DL (ref 0–1.2)
BUN SERPL-MCNC: 16.3 MG/DL (ref 8–23)
BUN/CREAT SERPL: 21.2 (ref 7–25)
CALCIUM SPEC-SCNC: 9 MG/DL (ref 8.6–10.5)
CHLORIDE SERPL-SCNC: 100 MMOL/L (ref 98–107)
CO2 SERPL-SCNC: 25.6 MMOL/L (ref 22–29)
CREAT SERPL-MCNC: 0.77 MG/DL (ref 0.57–1)
DEPRECATED RDW RBC AUTO: 54 FL (ref 37–54)
EGFRCR SERPLBLD CKD-EPI 2021: 83.6 ML/MIN/1.73
EOSINOPHIL # BLD AUTO: 0.23 10*3/MM3 (ref 0–0.4)
EOSINOPHIL NFR BLD AUTO: 4.9 % (ref 0.3–6.2)
ERYTHROCYTE [DISTWIDTH] IN BLOOD BY AUTOMATED COUNT: 12.8 % (ref 12.3–15.4)
GLOBULIN UR ELPH-MCNC: 2.5 GM/DL
GLUCOSE SERPL-MCNC: 82 MG/DL (ref 65–99)
HCT VFR BLD AUTO: 36.5 % (ref 34–46.6)
HGB BLD-MCNC: 12.1 G/DL (ref 12–15.9)
IMM GRANULOCYTES # BLD AUTO: 0.01 10*3/MM3 (ref 0–0.05)
IMM GRANULOCYTES NFR BLD AUTO: 0.2 % (ref 0–0.5)
LYMPHOCYTES # BLD AUTO: 1.08 10*3/MM3 (ref 0.7–3.1)
LYMPHOCYTES NFR BLD AUTO: 22.9 % (ref 19.6–45.3)
MCH RBC QN AUTO: 37.6 PG (ref 26.6–33)
MCHC RBC AUTO-ENTMCNC: 33.2 G/DL (ref 31.5–35.7)
MCV RBC AUTO: 113.4 FL (ref 79–97)
MONOCYTES # BLD AUTO: 0.67 10*3/MM3 (ref 0.1–0.9)
MONOCYTES NFR BLD AUTO: 14.2 % (ref 5–12)
NEUTROPHILS NFR BLD AUTO: 2.67 10*3/MM3 (ref 1.7–7)
NEUTROPHILS NFR BLD AUTO: 56.5 % (ref 42.7–76)
PLATELET # BLD AUTO: 197 10*3/MM3 (ref 140–450)
PMV BLD AUTO: 9 FL (ref 6–12)
POTASSIUM SERPL-SCNC: 4.4 MMOL/L (ref 3.5–5.2)
PROT SERPL-MCNC: 6.6 G/DL (ref 6–8.5)
RBC # BLD AUTO: 3.22 10*6/MM3 (ref 3.77–5.28)
SODIUM SERPL-SCNC: 135 MMOL/L (ref 136–145)
WBC NRBC COR # BLD AUTO: 4.72 10*3/MM3 (ref 3.4–10.8)

## 2025-07-07 PROCEDURE — 1159F MED LIST DOCD IN RCRD: CPT | Performed by: INTERNAL MEDICINE

## 2025-07-07 PROCEDURE — 1160F RVW MEDS BY RX/DR IN RCRD: CPT | Performed by: INTERNAL MEDICINE

## 2025-07-07 PROCEDURE — 85025 COMPLETE CBC W/AUTO DIFF WBC: CPT

## 2025-07-07 PROCEDURE — 80053 COMPREHEN METABOLIC PANEL: CPT

## 2025-07-07 PROCEDURE — 36415 COLL VENOUS BLD VENIPUNCTURE: CPT

## 2025-07-07 PROCEDURE — 99215 OFFICE O/P EST HI 40 MIN: CPT | Performed by: INTERNAL MEDICINE

## 2025-07-07 PROCEDURE — 1126F AMNT PAIN NOTED NONE PRSNT: CPT | Performed by: INTERNAL MEDICINE

## 2025-07-07 RX ORDER — ONDANSETRON 2 MG/ML
8 INJECTION INTRAMUSCULAR; INTRAVENOUS ONCE
OUTPATIENT
Start: 2025-07-22

## 2025-07-07 RX ORDER — ONDANSETRON 2 MG/ML
8 INJECTION INTRAMUSCULAR; INTRAVENOUS ONCE
OUTPATIENT
Start: 2025-08-12

## 2025-07-07 RX ORDER — ONDANSETRON 2 MG/ML
8 INJECTION INTRAMUSCULAR; INTRAVENOUS ONCE
OUTPATIENT
Start: 2025-08-11

## 2025-07-07 RX ORDER — ONDANSETRON 2 MG/ML
8 INJECTION INTRAMUSCULAR; INTRAVENOUS ONCE
OUTPATIENT
Start: 2025-07-31

## 2025-07-07 RX ORDER — ONDANSETRON 2 MG/ML
8 INJECTION INTRAMUSCULAR; INTRAVENOUS ONCE
OUTPATIENT
Start: 2025-07-28

## 2025-07-07 RX ORDER — ONDANSETRON 2 MG/ML
8 INJECTION INTRAMUSCULAR; INTRAVENOUS ONCE
OUTPATIENT
Start: 2025-07-15

## 2025-07-07 RX ORDER — ONDANSETRON 2 MG/ML
8 INJECTION INTRAMUSCULAR; INTRAVENOUS ONCE
OUTPATIENT
Start: 2025-07-21

## 2025-07-07 RX ORDER — ONDANSETRON 2 MG/ML
8 INJECTION INTRAMUSCULAR; INTRAVENOUS ONCE
OUTPATIENT
Start: 2025-08-05

## 2025-07-07 RX ORDER — ONDANSETRON 2 MG/ML
8 INJECTION INTRAMUSCULAR; INTRAVENOUS ONCE
OUTPATIENT
Start: 2025-08-08

## 2025-07-07 RX ORDER — ONDANSETRON 2 MG/ML
8 INJECTION INTRAMUSCULAR; INTRAVENOUS ONCE
OUTPATIENT
Start: 2025-08-07

## 2025-07-07 RX ORDER — ONDANSETRON 2 MG/ML
8 INJECTION INTRAMUSCULAR; INTRAVENOUS ONCE
OUTPATIENT
Start: 2025-08-04

## 2025-07-07 RX ORDER — ONDANSETRON 2 MG/ML
8 INJECTION INTRAMUSCULAR; INTRAVENOUS ONCE
OUTPATIENT
Start: 2025-07-30

## 2025-07-07 RX ORDER — ONDANSETRON 2 MG/ML
8 INJECTION INTRAMUSCULAR; INTRAVENOUS ONCE
OUTPATIENT
Start: 2025-07-18

## 2025-07-07 RX ORDER — ONDANSETRON 2 MG/ML
8 INJECTION INTRAMUSCULAR; INTRAVENOUS ONCE
OUTPATIENT
Start: 2025-08-14

## 2025-07-07 RX ORDER — ONDANSETRON 2 MG/ML
8 INJECTION INTRAMUSCULAR; INTRAVENOUS ONCE
OUTPATIENT
Start: 2025-07-14

## 2025-07-07 RX ORDER — ONDANSETRON 2 MG/ML
8 INJECTION INTRAMUSCULAR; INTRAVENOUS ONCE
OUTPATIENT
Start: 2025-07-23

## 2025-07-07 RX ORDER — ONDANSETRON 2 MG/ML
8 INJECTION INTRAMUSCULAR; INTRAVENOUS ONCE
OUTPATIENT
Start: 2025-07-17

## 2025-07-07 RX ORDER — ONDANSETRON 2 MG/ML
8 INJECTION INTRAMUSCULAR; INTRAVENOUS ONCE
OUTPATIENT
Start: 2025-08-06

## 2025-07-07 RX ORDER — ONDANSETRON 2 MG/ML
8 INJECTION INTRAMUSCULAR; INTRAVENOUS ONCE
OUTPATIENT
Start: 2025-07-16

## 2025-07-07 RX ORDER — ONDANSETRON 2 MG/ML
8 INJECTION INTRAMUSCULAR; INTRAVENOUS ONCE
OUTPATIENT
Start: 2025-08-13

## 2025-07-07 RX ORDER — ONDANSETRON 2 MG/ML
8 INJECTION INTRAMUSCULAR; INTRAVENOUS ONCE
OUTPATIENT
Start: 2025-07-29

## 2025-07-07 RX ORDER — OCTREOTIDE ACETATE 30 MG
30 KIT INTRAMUSCULAR ONCE
OUTPATIENT
Start: 2025-07-28 | End: 2025-07-14

## 2025-07-07 RX ORDER — ONDANSETRON 2 MG/ML
8 INJECTION INTRAMUSCULAR; INTRAVENOUS ONCE
OUTPATIENT
Start: 2025-07-25

## 2025-07-07 RX ORDER — ONDANSETRON 2 MG/ML
8 INJECTION INTRAMUSCULAR; INTRAVENOUS ONCE
OUTPATIENT
Start: 2025-08-15

## 2025-07-07 RX ORDER — ONDANSETRON 2 MG/ML
8 INJECTION INTRAMUSCULAR; INTRAVENOUS ONCE
OUTPATIENT
Start: 2025-07-24

## 2025-07-07 RX ORDER — ONDANSETRON 2 MG/ML
8 INJECTION INTRAMUSCULAR; INTRAVENOUS ONCE
OUTPATIENT
Start: 2025-08-01

## 2025-07-07 NOTE — ADDENDUM NOTE
Addended by: WILLIS JAIN on: 7/7/2025 01:13 PM     Modules accepted: Orders     Yes will need this scheduled on a day off dialysis or 6 hours after dialysis is completed

## 2025-07-07 NOTE — LETTER
July 7, 2025     Mohini Baeza MD  [address] [facility] [id number]    Patient: Arely S MD Bonita   YOB: 1955   Date of Visit: 7/7/2025     Dear Mohini Baeza MD:       Thank you for referring Arely Mesa to me for evaluation. Below are the relevant portions of my assessment and plan of care.    If you have questions, please do not hesitate to call me. I look forward to following Arely along with you.         Sincerely,        Sam Negrete MD        CC: MD Sofi Michel MD Alyssa M Posteraro, RN  [place] MD Caden Vincent Lee G, MD  07/07/25 0995  Sign when Signing Visit  CHIEF COMPLAINT:  Better the farther out from Capecitabine Temodar she gets.    Problem List:  Oncology/Hematology History Overview Note   1.  Stage IV well-differentiated pancreatic neuroendocrine tumor clinical [id number] presenting as inpatient with abdominal pain.  CT angiogram chest showed scattered small lung nodules. CT Abd Pelvis and MRI pancreas protocol showed 2.6 cm mass within the body of the pancreas with associated coarse calcification. There is upstream pancreatic ductal dilation with atrophy of the pancreatic tail. Appearance is concerning for primary pancreatic malignancy. Numerous small hypoenhancing masses within the liver and spleen, concerning for metastatic lesions.  Upper abdominal/retroperitoneal lymphadenopathy, including a 3.3 cm mass adjacent to the left adrenal gland. Liver biopsy non diagnostic.  Ca19-9 and cmp and cbc unremarkable.2/20/2025 FDG PET positive for pancreas primary with axial and proximal appendicular skeletal, splenic, hepatic, and beata metastases.  2/21/2025 para aortic node biopsy positive well-differentiated grade 1 neuroendocrine carcinoma Ki-67 2-3%.  4/7/2025 started capecitabine Temodar.  After 3 cycles ending 6/18/2025, CTs show stable pancreas mass with stable splenic and liver lesions and some slightly smaller and some slightly  larger periaortic nodes.  Switched to Sandostatin 7/14/2025     -2/14/25 [facility] outpatient followup: Here to go over the above inpatient workup. Need definitive answer from tissue. Reviewed with Dr. Whittington re imaging sites to go after. Thinks the L3 level aortocaval node would be high yield. If can't get that then get spleen bx. Thought the yield and safety from that greater than from EUS bx of pancreas/ node. Could be Pratik ag negative pancreas cancer (hence neg Ca19-9) or PNET or.....I think this may be lymphoma. Will get bx as per Dr. Paul, PET, Echo, LDH, uric acid and see her back.   -2/14/2025 uric acid and LDH normal   -2/19/2025 ejection fraction 63.7% GLS -17%  -2/20/2025 PET shows hypermetabolic pancreas primary with lesions throughout the axial and proximal appendicular skeleton, numerous splenic and hepatic lesions, and beata metastases.  -2/21/2025 CT needle biopsy para-aortic node and bone marrow biopsy.Bone marrow pathology unremarkable.  Lymph node biopsy well-differentiated grade 1 neuroendocrine carcinoma Ki-67 2-3%.    -2/28/2025 [facility] follow-up: The patient has pancreatic neuroendocrine tumor grade 1.  However, because I was sniffing out potential lymphoma, I had previously ordered FDG PET and it was unusually abnormal as outlined above for a low-grade PNET.  That has me concerned that there is discordance between the behavior of the tumor and the Ki-67/grade.  I am going to check chromogranin A and pancreatic polypeptide though there is not universal agreement on the usefulness of those for diagnosing managing her following this.  I am going to get a copper dotatate PET and plan to start Sandostatin LAR in a couple of weeks.  However, if the copper dotatate PET does not like this up like would be expected for well-differentiated neuroendocrine tumor of the pancreas, then I would wonder if we should not lead with Sandostatin but consider Capecitabine Temodar or  intermediate to that a tyrosine kinase inhibitor or mTOR inhibitor.  I will communicate with Dr. Terell Floyd who is an expert in this area.  I relish his thoughts on the divination as to the true aggressiveness of this and how to proceed.  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 Floyd would like.  There is no ego involved here.       Addendum: I spoke with Dr. Floyd.  As she is not having pain or any syndromic symptoms, if the copper dotatate PET is positive then 1 could argue for watchful waiting.  Sandostatin analogs could perhaps hold that in check but could also introduce symptoms.  Could consider giving Capecitabine Temodar for 2 or 3 months and repeating the FDG PET and if she gets a rapid response pressed that to maximum cytoreduction and then go to either watchful waiting or maintenance Sandostatin analog.  Ultimately our decision will revolve around the results of her copper dotatate PET.  I will also add her neuron-specific enolase as if that is high that would lean us towards a more aggressive form of PNET.    - 3/24/2025 telemedicine [facility] follow-up: I reviewed CAT scans today with Dr. Greene CT chest abdomen pelvis showing her known liver and splenic and retroperitoneal adenopathy involvement.  Her FDG PET and copper dotatate PET, the latter performed by Dr. Floyd, show an array of abnormalities ranging from low-grade to intermediate grade to high-grade.  2/28/2025 neuron-specific enolase is elevated at 28 with a chromogranin A of 272 and a normal pancreatic polypeptide.   reviewed the lymph node that showed well-differentiated neuroendocrine carcinoma.  Per discussion with Dr. Floyd, given the fact that the FDG PET in the neuron-specific enolase suggests a significant high-grade component not just the well-differentiated low-grade component,  he recommends 2 or 3 months of Capecitabine Temodar and then repeating the FDG PET and then pressed to maximum cytoreduction if getting a good response and then either go to watchful waiting or maintenance Sandostatin analog.  We will get her educated and start Capecitabine Temodar.  Side effects discussed in detail with the patient and she will have her cancer treatment preparation visit later this week.  She has a trip for a meeting to [place] that she wants to go to that would put her towards her corazon if we started on 31 March so we will move that out a week and she will get labs that day and then see my nurse practitioner in my absence on May 5 for cycle 2.  She will keep us appraised of her tolerance.  After 2 to 3 months we would repeat imaging of FDG PET and then treat to maximum cytoreduction.  According to the patient, Dr. Floyd plans on seeing the patient back in June and he left her with the impression that she may be on Capecitabine Temodar for the better part of a year.  I will follow his lead on that as well as when to switch or fold in lanreotide versus continued watchful waiting at that junction.    -5/7/2025 [facility] medical oncology APRN visit: Patient completed first cycle of capecitabine Temodar.  She tolerated it fairly well but did have significant nausea with the Temodar.  She managed with Zofran and Phenergan as needed.  She is asking for Phenergan suppositories which I have sent in.  She had issues with constipation that is now controlled with fiber and prune juice.  She had a flareup of her hemorrhoids that are internal and is requesting Anusol HC suppository which I have sent in for her.  I reviewed her labs from 5/2/2025 that are adequate for treatment.  White count, hemoglobin, platelets and ANC are normal.  Liver enzymes slightly elevated with AST 53 and ALT 59.  Sodium 135, otherwise CMP unremarkable.  She is having sciatica pain on her left side down to her ankle that has been  ongoing since her PET scan.  She has done physical therapy without much relief.  She had an MRI that showed spinal stenosis with neuroforaminal impingement.  She is planning to get epidural steroid injection at some point.  We will continue treatment without any changes, she did start her Xeloda back on 5/5/2025.  She will notify us if nausea is unmanageable with the Temodar.  Otherwise we will see her back on 5/30/2025 prior to starting cycle 3 on 6/2/2025 at which time [place] of [place].    - 5/19/2025 through 5/21/2025 [facility] inpatient hospitalization for tachycardia with normal echocardiogram and feeling lightheaded.  EKG normal.  Felt to be due to volume depletion anemia and nausea all of which were improved after Zofran dexamethasone and IV fluids.  Hemoglobin down from 10 on admission to 8.7 with hydration.    - 5/27/2025 hemoglobin 9.9 with normal white count platelet count.  ALT 73 AST 43 alkaline phosphatase 140 C-reactive protein 0.56.  Sedimentation rate 5    - 5/30/2025 [facility] medical oncology follow-up: Hemoglobin 9.3 .9 otherwise unremarkable CBC.  CMP today pending.  Reviewed her hospitalization and cardiology evaluation.  Most of her tachycardia just likely related to anemia and decreased intravascular volumes.  Her AST and ALT are slightly elevated but normal bilirubin and the Temodar and Xeloda do not need dose adjustments unless bilirubin rises which it has not.  Today is the start of cycle 3 of capecitabine Temodar.  She still having periodic tachycardia.  Cardiology believes this is autonomic dysfunction brought on by the steroid injection in her spine.  She is going to [place] next week.  She will look towards getting a beta-blocker just in case and I am holding her Temodar and Xeloda which should start again this coming Monday and we will hold it a week but I do not think that her tachycardia is related to her treatment.  Whether the well-differentiated pancreatic  neuroendocrine tumor is causing some sort of paraneoplastic tachycardia I am unsure but she is not having flushing itching or diarrhea which I would expect to be associated with this more likely than simple tachycardia but one never knows.  Her echocardiogram is normal.  We will see her back July 7 for her fourth cycle and repeat her FDG PET after that to assess for response and have her see Dr. Floyd back after that to guide as to lanreotide versus watchful waiting.   IV hydration Monday through Friday at her discretion has been ordered as well.    - 6/30/2025 CT chest abdomen pelvis compared to March shows stable primary pancreatic mass with encasement and obstruction of the splenic vein.  Upper abdominal periaortic adenopathy with 1 node near left adrenal slightly smaller and other beata aggregate in the upper abdomen slightly larger.  Stable extensive hepatic and splenic metastases.    - 7/7/2025 [facility] medical oncology follow-up: Scan show no response to capecitabine Temodar x 3 courses.  Tachycardia has resolved.  Talked with Dr. Floyd.  At this junction we discussed going to watchful waiting versus Sandostatin LAR 30 mg IM every 4 weeks and she prefers the latter and I think that is reasonable.  We will start that in a week and will see my nurse practitioner back in a month and we will repeat CAT scans again in 3 months.  I have canceled her FDG PET plans.  There is nothing we are going to find on that that will alter our plans in the near term.  If she has high-grade progression we would go with FOLFOX but that does not appear to be the case presently.  If there is low-grade progression on Sandostatin then we would use the TKI's.  She will meet with Dr. Floyd on the 11th.     Primary pancreatic neuroendocrine tumor   2/28/2025 Initial Diagnosis    Primary pancreatic neuroendocrine tumor     4/7/2025 -  Chemotherapy    OP NEUROENDOCRINE Capecitabine / Temozolomide     Malignant poorly differentiated  "neuroendocrine carcinoma   3/27/2025 Initial Diagnosis    Malignant poorly differentiated neuroendocrine carcinoma     2025 -  Chemotherapy    OP SUPPORTIVE HYDRATION + ANTIEMETICS         HISTORY OF PRESENT ILLNESS:  The patient is a 69 y.o. female, here for follow up on management of metastatic pancreatic neuroendocrine tumor status post 3 cycles decitabine Temodar.  Feeling better the further out she gets from her last cycle of capecitabine Temodar    Past Medical History:   Diagnosis Date   • Asthma    • Fibroids    • Genital HSV    • Hypercholesteremia     Off meds ~    • Hypothyroid    • Meniere disease, right along with tinnitus    • Tubular adenoma of colon 2015    f/u scope 2018 normal   • Tubular adenoma of colon 2023     Past Surgical History:   Procedure Laterality Date   •  SECTION  1985   • EPIDURAL  2025   • ORIF PATELLA FRACTURE Left 2018   • RHINOPLASTY  1990   • TONSILLECTOMY  1964   • TRIGGER FINGER RELEASE Left 2013    index finger       Allergies   Allergen Reactions   • Sulfa Antibiotics Itching and Rash   • Hydrocodone-Acetaminophen Nausea And Vomiting       Family History and Social History reviewed and changed as necessary    REVIEW OF SYSTEM:   No new somatic complaints and traveled and enjoyed herself    PHYSICAL EXAM:  No jaundice icterus or pallor.  No respiratory distress.  No rashes.    Vitals:    25 0840   BP: 144/80   Pulse: 76   Resp: 16   Temp: 97.1 °F (36.2 °C)   SpO2: 99%   Weight: 60.3 kg (133 lb)   Height: 165.1 cm (65\")     Vitals:    25 0840   PainSc: 0-No pain          ECOG score: 0           Vitals reviewed.    ECOG: (0) Fully Active - Able to Carry On All Pre-disease Performance Without Restriction    Lab Results   Component Value Date    HGB 12.1 2025    HCT 36.5 2025    .4 (H) 2025     2025    WBC 4.72 2025    NEUTROABS 2.67 2025    LYMPHSABS " 1.08 07/07/2025    MONOSABS 0.67 07/07/2025    EOSABS 0.23 07/07/2025    BASOSABS 0.06 07/07/2025       Lab Results   Component Value Date    GLUCOSE 102 (H) 05/30/2025    BUN 12.7 05/30/2025    CREATININE 0.73 05/30/2025     (L) 05/30/2025    K 4.7 05/30/2025    CL 98 05/30/2025    CO2 25.0 05/30/2025    CALCIUM 9.0 05/30/2025    PROTEINTOT 6.3 05/30/2025    ALBUMIN 3.9 05/30/2025    BILITOT 0.6 05/30/2025    ALKPHOS 146 (H) 05/30/2025    AST 30 05/30/2025    ALT 48 (H) 05/30/2025             ASSESSMENT & PLAN:  1.  Stage IV well-differentiated pancreatic neuroendocrine tumor clinical [id number] presenting as inpatient with abdominal pain.  CT angiogram chest showed scattered small lung nodules. CT Abd Pelvis and MRI pancreas protocol showed 2.6 cm mass within the body of the pancreas with associated coarse calcification. There is upstream pancreatic ductal dilation with atrophy of the pancreatic tail. Appearance is concerning for primary pancreatic malignancy. Numerous small hypoenhancing masses within the liver and spleen, concerning for metastatic lesions.  Upper abdominal/retroperitoneal lymphadenopathy, including a 3.3 cm mass adjacent to the left adrenal gland. Liver biopsy non diagnostic.  Ca19-9 and cmp and cbc unremarkable.2/20/2025 FDG PET positive for pancreas primary with axial and proximal appendicular skeletal, splenic, hepatic, and beata metastases.  2/21/2025 para aortic node biopsy positive well-differentiated grade 1 neuroendocrine carcinoma Ki-67 2-3%.  4/7/2025 started capecitabine Temodar.  After 3 cycles ending 6/18/2025, CTs show stable pancreas mass with stable splenic and liver lesions and some slightly smaller and some slightly larger periaortic nodes.     -2/14/25 [facility] outpatient followup: Here to go over the above inpatient workup. Need definitive answer from tissue. Reviewed with Dr. Whittington re imaging sites to go after. Thinks the L3 level aortocaval node would be high  yield. If can't get that then get spleen bx. Thought the yield and safety from that greater than from EUS bx of pancreas/ node. Could be Pratik ag negative pancreas cancer (hence neg Ca19-9) or PNET or.....I think this may be lymphoma. Will get bx as per Dr. Paul, PET, Echo, LDH, uric acid and see her back.   -2/14/2025 uric acid and LDH normal   -2/19/2025 ejection fraction 63.7% GLS -17%  -2/20/2025 PET shows hypermetabolic pancreas primary with lesions throughout the axial and proximal appendicular skeleton, numerous splenic and hepatic lesions, and beata metastases.  -2/21/2025 CT needle biopsy para-aortic node and bone marrow biopsy.Bone marrow pathology unremarkable.  Lymph node biopsy well-differentiated grade 1 neuroendocrine carcinoma Ki-67 2-3%.    -2/28/2025 [facility] medical oncology follow-up: The patient has pancreatic neuroendocrine tumor grade 1.  However, because I was sniffing out potential lymphoma, I had previously ordered FDG PET and it was unusually abnormal as outlined above for a low-grade PNET.  That has me concerned that there is discordance between the behavior of the tumor and the Ki-67/grade.  I am going to check chromogranin A and pancreatic polypeptide though there is not universal agreement on the usefulness of those for diagnosing managing her following this.  I am going to get a copper dotatate PET and plan to start Sandostatin LAR in a couple of weeks.  However, if the copper dotatate PET does not like this up like would be expected for well-differentiated neuroendocrine tumor of the pancreas, then I would wonder if we should not lead with Sandostatin but consider Capecitabine Temodar or intermediate to that a tyrosine kinase inhibitor or mTOR inhibitor.  I will communicate with Dr. Terell Floyd who is an expert in this area.  I relish his thoughts on the divination as to the true aggressiveness of this and how to proceed.  She is a colleague of Dr. Floyd at [employer]  [facility] [facility] of [facility] and I suggested she consider obtaining her care with him but I am willing to help in what ever way she or Dr. Floyd would like.  There is no ego involved here.       Addendum: I spoke with Dr. Floyd.  As she is not having pain or any syndromic symptoms, if the copper dotatate PET is positive then 1 could argue for watchful waiting.  Sandostatin analogs could perhaps hold that in check but could also introduce symptoms.  Could consider giving Capecitabine Temodar for 2 or 3 months and repeating the FDG PET and if she gets a rapid response pressed that to maximum cytoreduction and then go to either watchful waiting or maintenance Sandostatin analog.  Ultimately our decision will revolve around the results of her copper dotatate PET.  I will also add her neuron-specific enolase as if that is high that would lean us towards a more aggressive form of PNET.    - 3/24/2025 telemedicine [facility] follow-up: I reviewed CAT scans today with Dr. Greene CT chest abdomen pelvis showing her known liver and splenic and retroperitoneal adenopathy involvement.  Her FDG PET and copper dotatate PET, the latter performed by Dr. Floyd, show an array of abnormalities ranging from low-grade to intermediate grade to high-grade.  2/28/2025 neuron-specific enolase is elevated at 28 with a chromogranin A of 272 and a normal pancreatic polypeptide.   reviewed the lymph node that showed well-differentiated neuroendocrine carcinoma.  Per discussion with Dr. Floyd, given the fact that the FDG PET in the neuron-specific enolase suggests a significant high-grade component not just the well-differentiated low-grade component, he recommends 2 or 3 months of Capecitabine Temodar and then repeating the FDG PET and then pressed to maximum cytoreduction if getting a good response and then either go to watchful waiting or maintenance Sandostatin analog.  We will get her educated and start Capecitabine Temodar.  Side  effects discussed in detail with the patient and she will have her cancer treatment preparation visit later this week.  She has a trip for a meeting to [place] that she wants to go to that would put her towards her corazon if we started on 31 March so we will move that out a week and she will get labs that day and then see my nurse practitioner in my absence on May 5 for cycle 2.  She will keep us appraised of her tolerance.  After 2 to 3 months we would repeat imaging of FDG PET and then treat to maximum cytoreduction.  According to the patient, Dr. Floyd plans on seeing the patient back in June and he left her with the impression that she may be on Capecitabine Temodar for the better part of a year.  I will follow his lead on that as well as when to switch or fold in lanreotide versus continued watchful waiting at that junction.    -5/7/2025 [facility] medical oncology APRN visit: Patient completed first cycle of capecitabine Temodar.  She tolerated it fairly well but did have significant nausea with the Temodar.  She managed with Zofran and Phenergan as needed.  She is asking for Phenergan suppositories which I have sent in.  She had issues with constipation that is now controlled with fiber and prune juice.  She had a flareup of her hemorrhoids that are internal and is requesting Anusol HC suppository which I have sent in for her.  I reviewed her labs from 5/2/2025 that are adequate for treatment.  White count, hemoglobin, platelets and ANC are normal.  Liver enzymes slightly elevated with AST 53 and ALT 59.  Sodium 135, otherwise CMP unremarkable.  She is having sciatica pain on her left side down to her ankle that has been ongoing since her PET scan.  She has done physical therapy without much relief.  She had an MRI that showed spinal stenosis with neuroforaminal impingement.  She is planning to get epidural steroid injection at some point.  We will continue treatment without any changes, she did start her Xeloda  back on 5/5/2025.  She will notify us if nausea is unmanageable with the Temodar.  Otherwise we will see her back on 5/30/2025 prior to starting cycle 3 on 6/2/2025 at which [place].    - 5/19/2025 through 5/21/2025 [facility] inpatient hospitalization for tachycardia with normal echocardiogram and feeling lightheaded.  EKG normal.  Felt to be due to volume depletion anemia and nausea all of which were improved after Zofran dexamethasone and IV fluids.  Hemoglobin down from 10 on admission to 8.7 with hydration.    - 5/27/2025 hemoglobin 9.9 with normal white count platelet count.  ALT 73 AST 43 alkaline phosphatase 140 C-reactive protein 0.56.  Sedimentation rate 5    - 5/30/2025 [facility] medical oncology follow-up: Hemoglobin 9.3 .9 otherwise unremarkable CBC.  CMP today pending.  Reviewed her hospitalization and cardiology evaluation.  Most of her tachycardia just likely related to anemia and decreased intravascular volumes.  Her AST and ALT are slightly elevated but normal bilirubin and the Temodar and Xeloda do not need dose adjustments unless bilirubin rises which it has not.  Today is the start of cycle 3 of capecitabine Temodar.  She still having periodic tachycardia.  Cardiology believes this is autonomic dysfunction brought on by the steroid injection in her spine.  She is going to [place] next week.  She will look towards getting a beta-blocker just in case and I am holding her Temodar and Xeloda which should start again this coming Monday and we will hold it a week but I do not think that her tachycardia is related to her treatment.  Whether the well-differentiated pancreatic neuroendocrine tumor is causing some sort of paraneoplastic tachycardia I am unsure but she is not having flushing itching or diarrhea which I would expect to be associated with this more likely than simple tachycardia but one never knows.  Her echocardiogram is normal.  We will see her back July 7 for  her fourth cycle and repeat her FDG PET after that to assess for response and have her see Dr. Floyd back after that to guide as to lanreotide versus watchful waiting.   IV hydration Monday through Friday at her discretion has been ordered as well.    - 6/30/2025 CT chest abdomen pelvis compared to March shows stable primary pancreatic mass with encasement and obstruction of the splenic vein.  Upper abdominal periaortic adenopathy with 1 node near left adrenal slightly smaller and other beata aggregate in the upper abdomen slightly larger.  Stable extensive hepatic and splenic metastases.    - 7/7/2025 [facility] oncology follow-up: Scan show no response to capecitabine Temodar x 3 courses.  Tachycardia has resolved.  Talked with Dr. Floyd.  At this junction we discussed going to watchful waiting versus Sandostatin LAR 30 mg IM every 4 weeks and she prefers the latter and I think that is reasonable.  We will start that in a week and will see my nurse practitioner back in a month and we will repeat CAT scans again in 3 months.  I have canceled her FDG PET plans.  There is nothing we are going to find on that that will alter our plans in the near term.  If she has high-grade progression we would go with FOLFOX but that does not appear to be the case presently.  If there is low-grade progression on Sandostatin then we would use the TKI's.  She will meet with Dr. Floyd on the 11th.    Total time of care today inclusive of time spent today prior to patient's arrival reviewing interval data and during visit translating the patient putting forth the plan as outlined and discussing with Dr. Floyd and after visit putting forth this plan took 1 hour patient care time throughout the day today.  Sam Negrete MD    07/07/2025

## 2025-07-07 NOTE — PROGRESS NOTES
CHIEF COMPLAINT:  Better the farther out from Capecitabine Temodar she gets.    Problem List:  Oncology/Hematology History Overview Note   1.  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  CT angiogram chest showed scattered small lung nodules. CT Abd Pelvis and MRI pancreas protocol showed 2.6 cm mass within the body of the pancreas with associated coarse calcification. There is upstream pancreatic ductal dilation with atrophy of the pancreatic tail. Appearance is concerning for primary pancreatic malignancy. Numerous small hypoenhancing masses within the liver and spleen, concerning for metastatic lesions.  Upper abdominal/retroperitoneal lymphadenopathy, including a 3.3 cm mass adjacent to the left adrenal gland. Liver biopsy non diagnostic.  Ca19-9 and cmp and cbc unremarkable.2/20/2025 FDG PET positive for pancreas primary with axial and proximal appendicular skeletal, splenic, hepatic, and beata metastases.  2/21/2025 para aortic node biopsy positive well-differentiated grade 1 neuroendocrine carcinoma Ki-67 2-3%.  4/7/2025 started capecitabine Temodar.  After 3 cycles ending 6/18/2025, CTs show stable pancreas mass with stable splenic and liver lesions and some slightly smaller and some slightly larger periaortic nodes.  Switched to Sandostatin 7/14/2025     -2/14/25 [facility] outpatient followup: Here to go over the above inpatient workup. Need definitive answer from tissue. Reviewed with Dr. Whittington re imaging sites to go after. Thinks the L3 level aortocaval node would be high yield. If can't get that then get spleen bx. Thought the yield and safety from that greater than from EUS bx of pancreas/ node. Could be Pratik ag negative pancreas cancer (hence neg Ca19-9) or PNET or.....I think this may be lymphoma. Will get bx as per Dr. Paul, PET, Echo, LDH, uric acid and see her back.   -2/14/2025 uric acid and LDH normal    -2/19/2025 ejection fraction 63.7% GLS -17%  -2/20/2025 PET shows hypermetabolic pancreas primary with lesions throughout the axial and proximal appendicular skeleton, numerous splenic and hepatic lesions, and beata metastases.  -2/21/2025 CT needle biopsy para-aortic node and bone marrow biopsy.Bone marrow pathology unremarkable.  Lymph node biopsy well-differentiated grade 1 neuroendocrine carcinoma Ki-67 2-3%.    -2/28/2025 [facility] oncology follow-up: The patient has pancreatic neuroendocrine tumor grade 1.  However, because I was sniffing out potential lymphoma, I had previously ordered FDG PET and it was unusually abnormal as outlined above for a low-grade PNET.  That has me concerned that there is discordance between the behavior of the tumor and the Ki-67/grade.  I am going to check chromogranin A and pancreatic polypeptide though there is not universal agreement on the usefulness of those for diagnosing managing her following this.  I am going to get a copper dotatate PET and plan to start Sandostatin LAR in a couple of weeks.  However, if the copper dotatate PET does not like this up like would be expected for well-differentiated neuroendocrine tumor of the pancreas, then I would wonder if we should not lead with Sandostatin but consider Capecitabine Temodar or intermediate to that a tyrosine kinase inhibitor or mTOR inhibitor.  I will communicate with Dr. Terell Floyd who is an expert in this area.  I relish his thoughts on the divination as to the true aggressiveness of this and how to proceed.  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 Floyd would like.  There is no ego involved here.       Addendum: I spoke with Dr. Floyd.  As she is not having pain or any syndromic symptoms, if the copper dotatate PET is positive then 1 could argue for  watchful waiting.  Sandostatin analogs could perhaps hold that in check but could also introduce symptoms.  Could consider giving Capecitabine Temodar for 2 or 3 months and repeating the FDG PET and if she gets a rapid response pressed that to maximum cytoreduction and then go to either watchful waiting or maintenance Sandostatin analog.  Ultimately our decision will revolve around the results of her copper dotatate PET.  I will also add her neuron-specific enolase as if that is high that would lean us towards a more aggressive form of PNET.    - 3/24/2025 [facility]med[facility] follow-up: I reviewed CAT scans today with Dr. Greene CT chest abdomen pelvis showing her known liver and splenic and retroperitoneal adenopathy involvement.  Her FDG PET and copper dotatate PET, the latter performed by Dr. Floyd, show an array of abnormalities ranging from low-grade to intermediate grade to high-grade.  2/28/2025 neuron-specific enolase is elevated at 28 with a chromogranin A of 272 and a normal pancreatic polypeptide.  UK reviewed the lymph node that showed well-differentiated neuroendocrine carcinoma.  Per discussion with Dr. Floyd, given the fact that the FDG PET in the neuron-specific enolase suggests a significant high-grade component not just the well-differentiated low-grade component, he recommends 2 or 3 months of Capecitabine Temodar and then repeating the FDG PET and then pressed to maximum cytoreduction if getting a good response and then either go to watchful waiting or maintenance Sandostatin analog.  We will get her educated and start Capecitabine Temodar.  Side effects discussed in detail with the patient and she will have her cancer treatment preparation visit later this week.  She has a trip for a meeting to [place] that she wants to go to that would put her towards her corazon if we started on 31 March so we will move that out a week and she will get labs that day and then see my nurse practitioner in my  absence on May 5 for cycle 2.  She will keep us appraised of her tolerance.  After 2 to 3 months we would repeat imaging of FDG PET and then treat to maximum cytoreduction.  According to the patient, Dr. Floyd plans on seeing the patient back in June and he left her with the impression that she may be on Capecitabine Temodar for the better part of a year.  I will follow his lead on that as well as when to switch or fold in lanreotide versus continued watchful waiting at that junction.    -5/7/2025 [facility] medical oncology APRN visit: Patient completed first cycle of capecitabine Temodar.  She tolerated it fairly well but did have significant nausea with the Temodar.  She managed with Zofran and Phenergan as needed.  She is asking for Phenergan suppositories which I have sent in.  She had issues with constipation that is now controlled with fiber and prune juice.  She had a flareup of her hemorrhoids that are internal and is requesting Anusol HC suppository which I have sent in for her.  I reviewed her labs from 5/2/2025 that are adequate for treatment.  White count, hemoglobin, platelets and ANC are normal.  Liver enzymes slightly elevated with AST 53 and ALT 59.  Sodium 135, otherwise CMP unremarkable.  She is having sciatica pain on her left side down to her ankle that has been ongoing since her PET scan.  She has done physical therapy without much relief.  She had an MRI that showed spinal stenosis with neuroforaminal impingement.  She is planning to get epidural steroid injection at some point.  We will continue treatment without any changes, she did start her Xeloda back on 5/5/2025.  She will notify us if nausea is unmanageable with the Temodar.  Otherwise we will see her back on 5/30/2025 prior to starting cycle 3 on 6/2/2025 at which [place].    - 5/19/2025 through 5/21/2025 [facility] inpatient hospitalization for tachycardia with normal echocardiogram and feeling lightheaded.   EKG normal.  Felt to be due to volume depletion anemia and nausea all of which were improved after Zofran dexamethasone and IV fluids.  Hemoglobin down from 10 on admission to 8.7 with hydration.    - 5/27/2025 hemoglobin 9.9 with normal white count platelet count.  ALT 73 AST 43 alkaline phosphatase 140 C-reactive protein 0.56.  Sedimentation rate 5    - 5/30/2025 [facility] oncology follow-up: Hemoglobin 9.3 .9 otherwise unremarkable CBC.  CMP today pending.  Reviewed her hospitalization and cardiology evaluation.  Most of her tachycardia just likely related to anemia and decreased intravascular volumes.  Her AST and ALT are slightly elevated but normal bilirubin and the Temodar and Xeloda do not need dose adjustments unless bilirubin rises which it has not.  Today is the start of cycle 3 of capecitabine Temodar.  She still having periodic tachycardia.  Cardiology believes this is autonomic dysfunction brought on by the steroid injection in her spine.  She is going to [place] next week.  She will look towards getting a beta-blocker just in case and I am holding her Temodar and Xeloda which should start again this coming Monday and we will hold it a week but I do not think that her tachycardia is related to her treatment.  Whether the well-differentiated pancreatic neuroendocrine tumor is causing some sort of paraneoplastic tachycardia I am unsure but she is not having flushing itching or diarrhea which I would expect to be associated with this more likely than simple tachycardia but one never knows.  Her echocardiogram is normal.  We will see her back July 7 for her fourth cycle and repeat her FDG PET after that to assess for response and have her see Dr. Floyd back after that to guide as to lanreotide versus watchful waiting.   IV hydration Monday through Friday at her discretion has been ordered as well.    - 6/30/2025 CT chest abdomen pelvis compared to March shows stable primary pancreatic mass  with encasement and obstruction of the splenic vein.  Upper abdominal periaortic adenopathy with 1 node near left adrenal slightly smaller and other beata aggregate in the upper abdomen slightly larger.  Stable extensive hepatic and splenic metastases.    - 7/7/2025 [facility] [place], operated by [facility] medical oncology follow-up: Scan show no response to capecitabine Temodar x 3 courses.  Tachycardia has resolved.  Talked with Dr. Floyd.  At this junction we discussed going to watchful waiting versus Sandostatin LAR 30 mg IM every 4 weeks and she prefers the latter and I think that is reasonable.  We will start that in a week and will see my nurse practitioner back in a month and we will repeat CAT scans again in 3 months.  I have canceled her FDG PET plans.  There is nothing we are going to find on that that will alter our plans in the near term.  If she has high-grade progression we would go with FOLFOX but that does not appear to be the case presently.  If there is low-grade progression on Sandostatin then we would use the TKI's.  She will meet with Dr. Floyd on the 11th.     Primary pancreatic neuroendocrine tumor   2/28/2025 Initial Diagnosis    Primary pancreatic neuroendocrine tumor     4/7/2025 -  Chemotherapy    OP NEUROENDOCRINE Capecitabine / Temozolomide     Malignant poorly differentiated neuroendocrine carcinoma   3/27/2025 Initial Diagnosis    Malignant poorly differentiated neuroendocrine carcinoma     5/19/2025 -  Chemotherapy    OP SUPPORTIVE HYDRATION + ANTIEMETICS         HISTORY OF PRESENT ILLNESS:  The patient is a 69 y.o. female, here for follow up on management of metastatic pancreatic neuroendocrine tumor status post 3 cycles decitabine Temodar.  Feeling better the further out she gets from her last cycle of capecitabine Temodar    Past Medical History:   Diagnosis Date    Asthma 1988    Fibroids     Genital HSV 1975    Hypercholesteremia 2008    Off meds ~ 2014    Hypothyroid 2003    Meniere disease, right along  "with tinnitus 2018    Tubular adenoma of colon 2015    f/u scope 2018 normal    Tubular adenoma of colon 2023     Past Surgical History:   Procedure Laterality Date     SECTION  1985    EPIDURAL  2025    ORIF PATELLA FRACTURE Left 2018    RHINOPLASTY  1990    TONSILLECTOMY  1964    TRIGGER FINGER RELEASE Left 2013    index finger       Allergies   Allergen Reactions    Sulfa Antibiotics Itching and Rash    Hydrocodone-Acetaminophen Nausea And Vomiting       Family History and Social History reviewed and changed as necessary    REVIEW OF SYSTEM:   No new somatic complaints and traveled and enjoyed herself    PHYSICAL EXAM:  No jaundice icterus or pallor.  No respiratory distress.  No rashes.    Vitals:    25 0840   BP: 144/80   Pulse: 76   Resp: 16   Temp: 97.1 °F (36.2 °C)   SpO2: 99%   Weight: 60.3 kg (133 lb)   Height: 165.1 cm (65\")     Vitals:    25 0840   PainSc: 0-No pain          ECOG score: 0           Vitals reviewed.    ECOG: (0) Fully Active - Able to Carry On All Pre-disease Performance Without Restriction    Lab Results   Component Value Date    HGB 12.1 2025    HCT 36.5 2025    .4 (H) 2025     2025    WBC 4.72 2025    NEUTROABS 2.67 2025    LYMPHSABS 1.08 2025    MONOSABS 0.67 2025    EOSABS 0.23 2025    BASOSABS 0.06 2025       Lab Results   Component Value Date    GLUCOSE 102 (H) 2025    BUN 12.7 2025    CREATININE 0.73 2025     (L) 2025    K 4.7 2025    CL 98 2025    CO2 25.0 2025    CALCIUM 9.0 2025    PROTEINTOT 6.3 2025    ALBUMIN 3.9 2025    BILITOT 0.6 2025    ALKPHOS 146 (H) 2025    AST 30 2025    ALT 48 (H) 2025             ASSESSMENT & PLAN:  1.  Stage IV well-differentiated pancreatic neuroendocrine tumor clinical [id number] presenting as inpatient with abdominal pain.  CT angiogram " chest showed scattered small lung nodules. CT Abd Pelvis and MRI pancreas protocol showed 2.6 cm mass within the body of the pancreas with associated coarse calcification. There is upstream pancreatic ductal dilation with atrophy of the pancreatic tail. Appearance is concerning for primary pancreatic malignancy. Numerous small hypoenhancing masses within the liver and spleen, concerning for metastatic lesions.  Upper abdominal/retroperitoneal lymphadenopathy, including a 3.3 cm mass adjacent to the left adrenal gland. Liver biopsy non diagnostic.  Ca19-9 and cmp and cbc unremarkable.2/20/2025 FDG PET positive for pancreas primary with axial and proximal appendicular skeletal, splenic, hepatic, and beata metastases.  2/21/2025 para aortic node biopsy positive well-differentiated grade 1 neuroendocrine carcinoma Ki-67 2-3%.  4/7/2025 started capecitabine Temodar.  After 3 cycles ending 6/18/2025, CTs show stable pancreas mass with stable splenic and liver lesions and some slightly smaller and some slightly larger periaortic nodes.     -2/14/25 [facility] followup: Here to go over the above inpatient workup. Need definitive answer from tissue. Reviewed with Dr. Whittington re imaging sites to go after. Thinks the L3 level aortocaval node would be high yield. If can't get that then get spleen bx. Thought the yield and safety from that greater than from EUS bx of pancreas/ node. Could be Pratik ag negative pancreas cancer (hence neg Ca19-9) or PNET or.....I think this may be lymphoma. Will get bx as per Dr. Paul, PET, Echo, LDH, uric acid and see her back.   -2/14/2025 uric acid and LDH normal   -2/19/2025 ejection fraction 63.7% GLS -17%  -2/20/2025 PET shows hypermetabolic pancreas primary with lesions throughout the axial and proximal appendicular skeleton, numerous splenic and hepatic lesions, and beata metastases.  -2/21/2025 CT needle biopsy para-aortic node and bone marrow biopsy.Bone marrow pathology  unremarkable.  Lymph node biopsy well-differentiated grade 1 neuroendocrine carcinoma Ki-67 2-3%.    -2/28/2025 [facility] oncology follow-up: The patient has pancreatic neuroendocrine tumor grade 1.  However, because I was sniffing out potential lymphoma, I had previously ordered FDG PET and it was unusually abnormal as outlined above for a low-grade PNET.  That has me concerned that there is discordance between the behavior of the tumor and the Ki-67/grade.  I am going to check chromogranin A and pancreatic polypeptide though there is not universal agreement on the usefulness of those for diagnosing managing her following this.  I am going to get a copper dotatate PET and plan to start Sandostatin LAR in a couple of weeks.  However, if the copper dotatate PET does not like this up like would be expected for well-differentiated neuroendocrine tumor of the pancreas, then I would wonder if we should not lead with Sandostatin but consider Capecitabine Temodar or intermediate to that a tyrosine kinase inhibitor or mTOR inhibitor.  I will communicate with Dr. Terell Floyd who is an expert in this area.  I relish his thoughts on the divination as to the true aggressiveness of this and how to proceed.  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 Floyd would like.  There is no ego involved here.       Addendum: I spoke with Dr. Floyd.  As she is not having pain or any syndromic symptoms, if the copper dotatate PET is positive then 1 could argue for watchful waiting.  Sandostatin analogs could perhaps hold that in check but could also introduce symptoms.  Could consider giving Capecitabine Temodar for 2 or 3 months and repeating the FDG PET and if she gets a rapid response pressed that to maximum cytoreduction and then go to either watchful waiting or maintenance  Sandostatin analog.  Ultimately our decision will revolve around the results of her copper dotatate PET.  I will also add her neuron-specific enolase as if that is high that would lean us towards a more aggressive form of PNET.    - 3/24/2025 [facility]med[facility] follow-up: I reviewed CAT scans today with Dr. Greene CT chest abdomen pelvis showing her known liver and splenic and retroperitoneal adenopathy involvement.  Her FDG PET and copper dotatate PET, the latter performed by Dr. Floyd, show an array of abnormalities ranging from low-grade to intermediate grade to high-grade.  2/28/2025 neuron-specific enolase is elevated at 28 with a chromogranin A of 272 and a normal pancreatic polypeptide.  UK reviewed the lymph node that showed well-differentiated neuroendocrine carcinoma.  Per discussion with Dr. Floyd, given the fact that the FDG PET in the neuron-specific enolase suggests a significant high-grade component not just the well-differentiated low-grade component, he recommends 2 or 3 months of Capecitabine Temodar and then repeating the FDG PET and then pressed to maximum cytoreduction if getting a good response and then either go to watchful waiting or maintenance Sandostatin analog.  We will get her educated and start Capecitabine Temodar.  Side effects discussed in detail with the patient and she will have her cancer treatment preparation visit later this week.  She has a trip for a meeting to [place] that she wants to go to that would put her towards her corazon if we started on 31 March so we will move that out a week and she will get labs that day and then see my nurse practitioner in my absence on May 5 for cycle 2.  She will keep us appraised of her tolerance.  After 2 to 3 months we would repeat imaging of FDG PET and then treat to maximum cytoreduction.  According to the patient, Dr. Floyd plans on seeing the patient back in June and he left her with the impression that she may be on Capecitabine  Temodar for the better part of a year.  I will follow his lead on that as well as when to switch or fold in lanreotide versus continued watchful waiting at that junction.    -5/7/2025 [facility] medical oncology APRN visit: Patient completed first cycle of capecitabine Temodar.  She tolerated it fairly well but did have significant nausea with the Temodar.  She managed with Zofran and Phenergan as needed.  She is asking for Phenergan suppositories which I have sent in.  She had issues with constipation that is now controlled with fiber and prune juice.  She had a flareup of her hemorrhoids that are internal and is requesting Anusol HC suppository which I have sent in for her.  I reviewed her labs from 5/2/2025 that are adequate for treatment.  White count, hemoglobin, platelets and ANC are normal.  Liver enzymes slightly elevated with AST 53 and ALT 59.  Sodium 135, otherwise CMP unremarkable.  She is having sciatica pain on her left side down to her ankle that has been ongoing since her PET scan.  She has done physical therapy without much relief.  She had an MRI that showed spinal stenosis with neuroforaminal impingement.  She is planning to get epidural steroid injection at some point.  We will continue treatment without any changes, she did start her Xeloda back on 5/5/2025.  She will notify us if nausea is unmanageable with the Temodar.  Otherwise we will see her back on 5/30/2025 prior to starting cycle 3 on 6/2/2025 at [place]    - 5/19/2025 through 5/21/2025 [facility] inpatient hospitalization for tachycardia with normal echocardiogram and feeling lightheaded.  EKG normal.  Felt to be due to volume depletion anemia and nausea all of which were improved after Zofran dexamethasone and IV fluids.  Hemoglobin down from 10 on admission to 8.7 with hydration.    - 5/27/2025 hemoglobin 9.9 with normal white count platelet count.  ALT 73 AST 43 alkaline phosphatase 140 C-reactive  protein 0.56.  Sedimentation rate 5    - 5/30/2025 [facility] medical oncology follow-up: Hemoglobin 9.3 .9 otherwise unremarkable CBC.  CMP today pending.  Reviewed her hospitalization and cardiology evaluation.  Most of her tachycardia just likely related to anemia and decreased intravascular volumes.  Her AST and ALT are slightly elevated but normal bilirubin and the Temodar and Xeloda do not need dose adjustments unless bilirubin rises which it has not.  Today is the start of cycle 3 of capecitabine Temodar.  She still having periodic tachycardia.  Cardiology believes this is autonomic dysfunction brought on by the steroid injection in her spine.  She is going to [place] next week.  She will look towards getting a beta-blocker just in case and I am holding her Temodar and Xeloda which should start again this coming Monday and we will hold it a week but I do not think that her tachycardia is related to her treatment.  Whether the well-differentiated pancreatic neuroendocrine tumor is causing some sort of paraneoplastic tachycardia I am unsure but she is not having flushing itching or diarrhea which I would expect to be associated with this more likely than simple tachycardia but one never knows.  Her echocardiogram is normal.  We will see her back July 7 for her fourth cycle and repeat her FDG PET after that to assess for response and have her see Dr. Floyd back after that to guide as to lanreotide versus watchful waiting.   IV hydration Monday through Friday at her discretion has been ordered as well.    - 6/30/2025 CT chest abdomen pelvis compared to March shows stable primary pancreatic mass with encasement and obstruction of the splenic vein.  Upper abdominal periaortic adenopathy with 1 node near left adrenal slightly smaller and other beata aggregate in the upper abdomen slightly larger.  Stable extensive hepatic and splenic metastases.    - 7/7/2025 [facility] medical oncology follow-up: Scan show no  response to capecitabine Temodar x 3 courses.  Tachycardia has resolved.  Talked with Dr. Floyd.  At this junction we discussed going to watchful waiting versus Sandostatin LAR 30 mg IM every 4 weeks and she prefers the latter and I think that is reasonable.  We will start that in a week and will see my nurse practitioner back in a month and we will repeat CAT scans again in 3 months.  I have canceled her FDG PET plans.  There is nothing we are going to find on that that will alter our plans in the near term.  If she has high-grade progression we would go with FOLFOX but that does not appear to be the case presently.  If there is low-grade progression on Sandostatin then we would use the TKI's.  She will meet with Dr. Floyd on the 11th.    Total time of care today inclusive of time spent today prior to patient's arrival reviewing interval data and during visit translating the patient putting forth the plan as outlined and discussing with Dr. Floyd and after visit putting forth this plan took 1 hour patient care time throughout the day today.  Sam Negrete MD    07/07/2025

## 2025-07-10 ENCOUNTER — HOSPITAL ENCOUNTER (OUTPATIENT)
Dept: ONCOLOGY | Facility: HOSPITAL | Age: 70
Discharge: HOME | End: 2025-07-10
Payer: MEDICARE

## 2025-07-10 ENCOUNTER — SPECIALTY PHARMACY (OUTPATIENT)
Dept: ONCOLOGY | Facility: HOSPITAL | Age: 70
End: 2025-07-10
Payer: MEDICARE

## 2025-07-10 NOTE — PROGRESS NOTES
[facility]  [address] [place] [address]  [phone]      CHEMOTHERAPY EDUCATION    NAME:  Arely Mesa      : 1955           DATE: 07/10/25    Medication Education Sheets: (select all that apply)  Sandostatin LAR (Octreotide)      Other Education Sheets: (select all that apply)  CINV and Symptom Tracker Sheet    Chemotherapy Regimen:   Sandostatin LAR (octreotide) 30 mg IM every 28 days    TOPICS COMMENTS   NUTRITION & APPETITE CHANGES:  importance of maintaining healthy diet & weight, ways to manage to improve intake, dietary consult, exercise regimen, electrolyte and/or blood glucose abnormalities Increased or decreased blood sugar: This patient's oncology therapy can increase or decrease blood sugar.  Dr Mesa is going to get a blood glucose monitor to watch for any trends. She understands the symptoms of hypo and hyperglycemia.  We discussed how octreotide can change how our body absorbs fats from food and our B12 levels. She understands to let us know if she starts losing any weight.   DIARRHEA:  causes, s/s of dehydration, ways to manage, dietary changes, when to call MD We discussed there could be some abdominal side effects from octreotide such as diarrhea, bloating, gas, or abdominal pain. This should decrease with long term use.   NAUSEA & VOMITING:  cause, use of antiemetics, dietary changes, when to call MD Emetic risk: Minimal  Premeds: None  Scheduled home meds: None  PRN home meds: Ondansetron 8mg PO every 8 hours PRN nausea / vomiting  Pharmacy home meds sent to: She states she already has.     Gallstones Discussed that this medication could affect her gall bladder if used for a long period of time (possibly > or = 1 year). Discussed to report any sudden abdominal pain, fever, N/V, tea-colored urine, light-colered stools, or yellowing of the sclera of the eyes.   Heart Problems This medication could cause bradycardia or abnormal  heartbeats. The patient states her tachycardia has resolved and she is no longer taking bisoprolol. She understands to contact us for any dizziness or irregular heartbeat.   Thyroid Problems Discussed that octreotide can decrease the secretion of TSH. We will be checking her free T4 and TSH every 3 cycles. She is already taking levothyroxine and understands the symptoms of hypothyroidism.   Injection Site Pain We discussed that octreotide LAR is given by intragluteal injection. The injection volume is 6 mL. She may have injection site pain for 1 hour or so after. She plans to use an ice pack for pain relief.   MISCELLANEOUS:  drug interactions, administration, labs, etc. Discussed chemotherapy schedule, lab draws, infusion times, and total expected visit time.   DDIs: No significant DDIs  Lab draws: On or before day 1 of each cycle, no sooner than 3 days early.   INFERTILITY & SEXUALITY:    causes, fertility preservation options, sexuality changes, ways to manage, importance of birth control The patient is not of childbearing potential.     Medications:  Prior to Admission medications    Medication Sig Start Date End Date Taking? Authorizing Provider   Calcium Citrate-Vitamin D3 (CITRACAL) 315-6.25 MG-MCG tablet tablet Take 2 tablets by mouth Daily.    ProviderJanell MD   cetirizine (zyrTEC) 10 MG tablet Take 1 tablet by mouth Daily.    ProviderJanell MD   cyclobenzaprine (FLEXERIL) 10 MG tablet Take 1 tablet by mouth 3 (Three) Times a Day As Needed for Muscle Spasms. 2/8/25   Dalila Mendoza MD   cycloSPORINE (RESTASIS) 0.05 % ophthalmic emulsion Apply 2 drops to eye(s) as directed by provider Every 12 (Twelve) Hours. 10/16/24   Janell Vazquez MD   dapsone 100 MG tablet Take 1 tablet by mouth Daily. 6/13/25   Sam Negrete MD   Duavee 0.45-20 MG tablet Take 1 each by mouth Daily. 3/24/25   Linus Baxter MD   fluticasone (FLONASE) 50 MCG/ACT nasal spray Administer 2 sprays into the  nostril(s) as directed by provider Daily.    Janell Vazquez MD   glucosamine-chondroitin 500-400 MG per tablet Take 1 tablet by mouth Daily.    Janell Vazquez MD   hydrocortisone (ANUSOL-HC) 25 MG suppository Insert 1 suppository into the rectum 2 (Two) Times a Day As Needed for Hemorrhoids. 5/7/25   Mandy Yin APRN   levothyroxine (SYNTHROID, LEVOTHROID) 75 MCG tablet Take 1 tablet by mouth Daily.    Janell Vazquez MD   melatonin 5 MG tablet tablet Take 2 tablets by mouth Every Night. Takes 1-2 tablets    Janell Vazquez MD   Mirabegron ER (Myrbetriq) 25 MG tablet sustained-release 24 hour 24 hr tablet Take 1 tablet by mouth Daily. 3/24/25   Linus Baxter MD   montelukast (SINGULAIR) 10 MG tablet Take 1 tablet by mouth Every Night.    Janell Vazquez MD   ondansetron ODT (ZOFRAN-ODT) 8 MG disintegrating tablet Place 1 tablet on the tongue Every 8 (Eight) Hours As Needed for Nausea or Vomiting. 4/21/25   Mandy Yin APRN   oxyCODONE-acetaminophen (Percocet) 5-325 MG per tablet Take 1 tablet by mouth Every 6 (Six) Hours As Needed for Moderate Pain. 2/8/25   Dalila Mendoza MD   polycarbophil (calcium polycarbophil) 625 MG tablet tablet Take 1 tablet by mouth Daily. Takes 1-2 times daily    Janell Vazquez MD   promethazine (PHENERGAN) 25 MG suppository Insert 1 suppository into the rectum Every 8 (Eight) Hours As Needed for Nausea or Vomiting. 5/7/25   Mandy Yin APRN   promethazine (PHENERGAN) 25 MG tablet Take 1 tablet by mouth Every 8 (Eight) Hours As Needed for Nausea or Vomiting. 3/24/25   Sam Negrete MD   sennosides-docusate (PERICOLACE) 8.6-50 MG per tablet Take 2 tablets by mouth 2 (Two) Times a Day. 2/8/25   Dalila Mendoza MD   TURMERIC PO Take 553 mg by mouth Daily.    Janell Vazquez MD       Notes: All questions and concerns were addressed. Provided a personalized treatment calendar to patient (includes treatment and lab schedule).  Provided patient with contact information for the pharmacist and clinic while instructing her to call if any questions or concerns arise. Informed consent for treatment was obtained. Patient was receptive to information and expressed understanding.     January Rae, PharmD, [facility] Specialty Oncology Pharmacist  Phone [phone]      7/10/2025  10:03 EDT

## 2025-07-18 DIAGNOSIS — C7A.1 MALIGNANT POORLY DIFFERENTIATED NEUROENDOCRINE CARCINOMA: Primary | ICD-10-CM

## 2025-07-18 PROBLEM — Z79.899 ENCOUNTER FOR LONG-TERM (CURRENT) USE OF HIGH-RISK MEDICATION: Status: ACTIVE | Noted: 2025-07-18

## 2025-07-18 NOTE — PROGRESS NOTES
Dr. Negrete received communication from Dr. Floyd at  stating that patient needs to stop her Temodar and Capecitabine and start on sandostatin. In basket sent to schedulers to gt patient scheduled to start sandostatin next week and move follow up out to Cycle 2 with Dr. Negrete.

## 2025-07-24 ENCOUNTER — DOCUMENTATION (OUTPATIENT)
Dept: NUTRITION | Facility: HOSPITAL | Age: 70
End: 2025-07-24
Payer: MEDICARE

## 2025-07-24 ENCOUNTER — HOSPITAL ENCOUNTER (OUTPATIENT)
Dept: ONCOLOGY | Facility: HOSPITAL | Age: 70
Discharge: HOME | End: 2025-07-24
Payer: MEDICARE

## 2025-07-24 ENCOUNTER — LAB (OUTPATIENT)
Dept: LAB | Facility: HOSPITAL | Age: 70
End: 2025-07-24
Payer: MEDICARE

## 2025-07-24 VITALS
WEIGHT: 136 LBS | DIASTOLIC BLOOD PRESSURE: 70 MMHG | HEART RATE: 71 BPM | TEMPERATURE: 97.6 F | SYSTOLIC BLOOD PRESSURE: 160 MMHG | BODY MASS INDEX: 22.66 KG/M2 | RESPIRATION RATE: 16 BRPM | HEIGHT: 65 IN

## 2025-07-24 DIAGNOSIS — C7A.1 MALIGNANT POORLY DIFFERENTIATED NEUROENDOCRINE CARCINOMA: ICD-10-CM

## 2025-07-24 DIAGNOSIS — Z79.899 ENCOUNTER FOR LONG-TERM (CURRENT) USE OF HIGH-RISK MEDICATION: ICD-10-CM

## 2025-07-24 DIAGNOSIS — C7A.1 MALIGNANT POORLY DIFFERENTIATED NEUROENDOCRINE CARCINOMA: Primary | ICD-10-CM

## 2025-07-24 LAB
ALBUMIN SERPL-MCNC: 4.1 G/DL (ref 3.5–5.2)
ALBUMIN/GLOB SERPL: 1.4 G/DL
ALP SERPL-CCNC: 113 U/L (ref 39–117)
ALT SERPL W P-5'-P-CCNC: 34 U/L (ref 1–33)
ANION GAP SERPL CALCULATED.3IONS-SCNC: 9.5 MMOL/L (ref 5–15)
AST SERPL-CCNC: 33 U/L (ref 1–32)
BASOPHILS # BLD AUTO: 0.06 10*3/MM3 (ref 0–0.2)
BASOPHILS NFR BLD AUTO: 1.1 % (ref 0–1.5)
BILIRUB SERPL-MCNC: 0.2 MG/DL (ref 0–1.2)
BUN SERPL-MCNC: 15.8 MG/DL (ref 8–23)
BUN/CREAT SERPL: 22.6 (ref 7–25)
CALCIUM SPEC-SCNC: 9.4 MG/DL (ref 8.6–10.5)
CHLORIDE SERPL-SCNC: 98 MMOL/L (ref 98–107)
CO2 SERPL-SCNC: 26.5 MMOL/L (ref 22–29)
CREAT SERPL-MCNC: 0.7 MG/DL (ref 0.57–1)
DEPRECATED RDW RBC AUTO: 50.5 FL (ref 37–54)
EGFRCR SERPLBLD CKD-EPI 2021: 93.8 ML/MIN/1.73
EOSINOPHIL # BLD AUTO: 0.1 10*3/MM3 (ref 0–0.4)
EOSINOPHIL NFR BLD AUTO: 1.8 % (ref 0.3–6.2)
ERYTHROCYTE [DISTWIDTH] IN BLOOD BY AUTOMATED COUNT: 12.7 % (ref 12.3–15.4)
GLOBULIN UR ELPH-MCNC: 2.9 GM/DL
GLUCOSE SERPL-MCNC: 119 MG/DL (ref 65–99)
HCT VFR BLD AUTO: 39 % (ref 34–46.6)
HGB BLD-MCNC: 13 G/DL (ref 12–15.9)
IMM GRANULOCYTES # BLD AUTO: 0.01 10*3/MM3 (ref 0–0.05)
IMM GRANULOCYTES NFR BLD AUTO: 0.2 % (ref 0–0.5)
LYMPHOCYTES # BLD AUTO: 1.03 10*3/MM3 (ref 0.7–3.1)
LYMPHOCYTES NFR BLD AUTO: 18.7 % (ref 19.6–45.3)
MCH RBC QN AUTO: 35.3 PG (ref 26.6–33)
MCHC RBC AUTO-ENTMCNC: 33.3 G/DL (ref 31.5–35.7)
MCV RBC AUTO: 106 FL (ref 79–97)
MONOCYTES # BLD AUTO: 0.56 10*3/MM3 (ref 0.1–0.9)
MONOCYTES NFR BLD AUTO: 10.1 % (ref 5–12)
NEUTROPHILS NFR BLD AUTO: 3.76 10*3/MM3 (ref 1.7–7)
NEUTROPHILS NFR BLD AUTO: 68.1 % (ref 42.7–76)
PLATELET # BLD AUTO: 234 10*3/MM3 (ref 140–450)
PMV BLD AUTO: 9.4 FL (ref 6–12)
POTASSIUM SERPL-SCNC: 4.2 MMOL/L (ref 3.5–5.2)
PROT SERPL-MCNC: 7 G/DL (ref 6–8.5)
RBC # BLD AUTO: 3.68 10*6/MM3 (ref 3.77–5.28)
SODIUM SERPL-SCNC: 134 MMOL/L (ref 136–145)
T4 FREE SERPL-MCNC: 1.4 NG/DL (ref 0.92–1.68)
TSH SERPL DL<=0.05 MIU/L-ACNC: 1.35 UIU/ML (ref 0.27–4.2)
WBC NRBC COR # BLD AUTO: 5.52 10*3/MM3 (ref 3.4–10.8)

## 2025-07-24 PROCEDURE — 85025 COMPLETE CBC W/AUTO DIFF WBC: CPT

## 2025-07-24 PROCEDURE — 36415 COLL VENOUS BLD VENIPUNCTURE: CPT

## 2025-07-24 PROCEDURE — 25010000002 OCTREOTIDE PER 1 MG: Performed by: INTERNAL MEDICINE

## 2025-07-24 PROCEDURE — 96372 THER/PROPH/DIAG INJ SC/IM: CPT

## 2025-07-24 PROCEDURE — 84439 ASSAY OF FREE THYROXINE: CPT

## 2025-07-24 PROCEDURE — 80053 COMPREHEN METABOLIC PANEL: CPT

## 2025-07-24 PROCEDURE — 84443 ASSAY THYROID STIM HORMONE: CPT

## 2025-07-24 RX ORDER — OCTREOTIDE ACETATE 30 MG
30 KIT INTRAMUSCULAR ONCE
Status: COMPLETED | OUTPATIENT
Start: 2025-07-24 | End: 2025-07-24

## 2025-07-24 RX ADMIN — OCTREOTIDE ACETATE 30 MG: KIT at 12:48

## 2025-07-24 NOTE — PROGRESS NOTES
"Outpatient Oncology Nutrition     Reason for Visit: Oncology Nutrition Screening and Patient Education    Patient Name:  Arely Mesa MD    :  1955    MRN:  [id number]    Date of Encounter: 2025    Nutrition Assessment     Diagnosis: metastatic pancreatic neuroendocrine tumor     Chemotherapy: Temodar (temozolomide) + -Xeloda (capecitabine) / start date 4/2025 x 3 cycles completed 2025     Current Treatment: Sandostatin LAR (octreotide) 30 mg IM every 28 days / start date 25     Patient Active Problem List:    Patient Active Problem List   Diagnosis    Annual GYN exam in     Hypothyroidism (acquired)    Osteopenia with FRAX n/a    FH: Alzheimer's disease    Hormone replacement therapy - started ~ 53 years old    Increased risk of breast cancer - elevated CASI score    Pancreatic mass with metastasis to liver, spleen, bone & associated lymphadenopathy    Overactive bladder    Primary pancreatic neuroendocrine tumor    Encounter for long-term (current) use of high-risk medication    Malignant poorly differentiated neuroendocrine carcinoma    Tachycardia    Nausea and vomiting    Chronic low back pain    Asthma    Severe protein-calorie malnutrition    Encounter for long-term (current) use of high-risk medication       Food / Nutrition Related History   Patient reports her appetite / oral intake have been normal.  She endorses a stable weight.  She denies significant nutritional complaints at this time.  She reports having chronic constipation due to IBS and states she takes Benefiber daily to aid with regularity.     Hydration Status   Discussed the importance of hydration and encouraged her to increase her intake.    Goal: ~64 ounces     Enteral Feeding       Anthropometric Measurements     Height:    Ht Readings from Last 1 Encounters:   25 165.1 cm (65\")       Weight:    Wt Readings from Last 1 Encounters:   25 61.7 kg (136 lb)       BMI: 22.6 - Normal    Weight Change: " weight has been stable in the 130's x 1+ year    Review of Lab Data (Time Frame - 1 month / 2 month)   Labs reviewed - 7/7/25    Medication Review   MAR reviewed - Pericolace, Phenergan, and Zofran noted     Nutrition Focused Physical Findings       Nutrition Impact Symptoms   Constipation - history of IBS     Physical Activity   Normal with no limitations    Current Nutritional Intake     Oral diet:  Regular     Oral nutritional supplements: protein drinks / smoothies - as needed    Intake: oral intake has been normal     Malnutrition Risk Assessment     Recent weight loss over the past 6 months:  0 = No    Eating poorly because of a decreased appetite:  0 = No    Malnutrition Screening Score:     MST = 0 or 1 Patient not at risk for malnutrition    Nutrition Diagnosis     Problem    Etiology    Signs / Symptoms      Nutrition Intervention   Discussed the importance of good nutrition during her treatment course focusing on adequate calorie, protein, nutrient and fluid intake.  Advised her to continue with her normal eating habits but if she experiences nausea and / or diarrhea to consider consuming smaller more frequent meals/snacks throughout the day to aid with symptom management.  Emphasized the importance of protein and its role in the diet and recommended she continue to include high protein foods at each meal / snack.  Encouraged her to continue drinking protein drinks / smoothies as needed.  Also encouraged her to be eating a healthy and well balanced diet.         Goal   To achieve adequate nutritional and hydration intake.  To aid with nutrition impact symptom management as needed.     Monitoring / Evaluation   Answered her questions and she voiced understanding of information discussed.  RD's contact information provided and encouraged to call with questions.  Will follow up as indicated.     Denice Last MS, RD, LD

## 2025-08-21 ENCOUNTER — INFUSION (OUTPATIENT)
Facility: HOSPITAL | Age: 70
End: 2025-08-21
Payer: MEDICARE

## 2025-08-21 ENCOUNTER — LAB (OUTPATIENT)
Dept: LAB | Facility: HOSPITAL | Age: 70
End: 2025-08-21
Payer: MEDICARE

## 2025-08-21 ENCOUNTER — OFFICE VISIT (OUTPATIENT)
Dept: ONCOLOGY | Facility: CLINIC | Age: 70
End: 2025-08-21
Payer: MEDICARE

## 2025-08-21 VITALS
BODY MASS INDEX: 22.99 KG/M2 | DIASTOLIC BLOOD PRESSURE: 85 MMHG | WEIGHT: 138 LBS | SYSTOLIC BLOOD PRESSURE: 156 MMHG | HEIGHT: 65 IN | TEMPERATURE: 97.4 F | OXYGEN SATURATION: 100 % | HEART RATE: 62 BPM | RESPIRATION RATE: 18 BRPM

## 2025-08-21 VITALS — BODY MASS INDEX: 22.96 KG/M2 | RESPIRATION RATE: 16 BRPM | HEIGHT: 65 IN

## 2025-08-21 DIAGNOSIS — C7A.1 MALIGNANT POORLY DIFFERENTIATED NEUROENDOCRINE CARCINOMA: Primary | ICD-10-CM

## 2025-08-21 DIAGNOSIS — Z79.899 ENCOUNTER FOR LONG-TERM (CURRENT) USE OF HIGH-RISK MEDICATION: ICD-10-CM

## 2025-08-21 PROCEDURE — 25010000002 OCTREOTIDE PER 1 MG: Performed by: NURSE PRACTITIONER

## 2025-08-21 PROCEDURE — 96372 THER/PROPH/DIAG INJ SC/IM: CPT

## 2025-08-21 PROCEDURE — 99214 OFFICE O/P EST MOD 30 MIN: CPT | Performed by: NURSE PRACTITIONER

## 2025-08-21 PROCEDURE — 1126F AMNT PAIN NOTED NONE PRSNT: CPT | Performed by: NURSE PRACTITIONER

## 2025-08-21 RX ORDER — OCTREOTIDE ACETATE 30 MG
30 KIT INTRAMUSCULAR ONCE
Status: COMPLETED | OUTPATIENT
Start: 2025-08-21 | End: 2025-08-21

## 2025-08-21 RX ORDER — OCTREOTIDE ACETATE 30 MG
30 KIT INTRAMUSCULAR ONCE
Status: CANCELLED | OUTPATIENT
Start: 2025-08-21 | End: 2025-08-21

## 2025-08-21 RX ADMIN — OCTREOTIDE ACETATE 30 MG: KIT at 09:55
